# Patient Record
Sex: FEMALE | Race: WHITE | NOT HISPANIC OR LATINO | Employment: UNEMPLOYED | URBAN - METROPOLITAN AREA
[De-identification: names, ages, dates, MRNs, and addresses within clinical notes are randomized per-mention and may not be internally consistent; named-entity substitution may affect disease eponyms.]

---

## 2017-06-02 ENCOUNTER — HOSPITAL ENCOUNTER (EMERGENCY)
Facility: HOSPITAL | Age: 22
Discharge: HOME | End: 2017-06-02
Attending: EMERGENCY MEDICINE | Admitting: EMERGENCY MEDICINE

## 2017-06-02 VITALS
BODY MASS INDEX: 42.68 KG/M2 | WEIGHT: 250 LBS | TEMPERATURE: 98.7 F | HEIGHT: 64 IN | RESPIRATION RATE: 18 BRPM | OXYGEN SATURATION: 97 % | SYSTOLIC BLOOD PRESSURE: 120 MMHG | HEART RATE: 78 BPM | DIASTOLIC BLOOD PRESSURE: 73 MMHG

## 2017-06-02 DIAGNOSIS — H61.22 IMPACTED CERUMEN OF LEFT EAR: Primary | ICD-10-CM

## 2017-06-02 DIAGNOSIS — Z33.1 PREGNANCY, INCIDENTAL: ICD-10-CM

## 2017-06-02 PROCEDURE — 99283 EMERGENCY DEPT VISIT LOW MDM: CPT

## 2017-06-02 RX ORDER — DOCUSATE SODIUM 100 MG/1
100 CAPSULE, LIQUID FILLED ORAL ONCE
Status: COMPLETED | OUTPATIENT
Start: 2017-06-02 | End: 2017-06-02

## 2017-06-02 RX ADMIN — DOCUSATE SODIUM 100 MG: 100 CAPSULE, LIQUID FILLED ORAL at 02:33

## 2017-06-02 NOTE — ED PROVIDER NOTES
Subjective   HPI Comments: The patient presents with left ear pain since around 5 PM today.  The patient reports she did do some swimming today.  The patient denies any fever, chills, nausea, vomiting, or diarrhea.  The patient denies any other head or neck concerns.  The patient does report that she uses Q-tips.  Incidentally, the patient is also pregnant.  The patient does report a feeling of muffled hearing in the left ear.      History provided by:  Patient      Review of Systems   HENT: Positive for ear pain.    All other systems reviewed and are negative.      Past Medical History:   Diagnosis Date   • Acid reflux        Allergies   Allergen Reactions   • Zyrtec [Cetirizine]        Past Surgical History:   Procedure Laterality Date   • WISDOM TOOTH EXTRACTION         History reviewed. No pertinent family history.    Social History     Social History   • Marital status: Single     Spouse name: N/A   • Number of children: N/A   • Years of education: N/A     Social History Main Topics   • Smoking status: Current Every Day Smoker     Packs/day: 0.50     Types: Cigarettes   • Smokeless tobacco: None   • Alcohol use 2.4 oz/week     4 Cans of beer per week      Comment: 4 of malt liquor nightly   • Drug use: No   • Sexual activity: Defer     Other Topics Concern   • None     Social History Narrative           Objective   Physical Exam   Constitutional: She is oriented to person, place, and time. She appears well-developed and well-nourished. No distress.   HENT:   Head: Normocephalic and atraumatic.   Right Ear: External ear normal.   Mouth/Throat: Oropharynx is clear and moist.   The patient has a cerumen impaction up against the left tympanic membrane with appearance of the use of Q-tips.  There is no obvious perforation though there is complete occlusion of the tympanic membrane.  There is no active bleeding noted.  There is no exudate or sign of external infection.  No other emergent findings.  No pain with  manipulation of the ear itself.   Neck: Normal range of motion.   Neurological: She is alert and oriented to person, place, and time.   Skin: Skin is warm and dry.   Psychiatric: She has a normal mood and affect. Her behavior is normal.   Nursing note and vitals reviewed.      Procedures         ED Course  ED Course   Comment By Time   The patient has findings consistent with cerumen impaction of the left external auditory canal from the use of Q-tips up against the tympanic membrane.  This point I don't feel comfortable trying to figure this out as it is right up against the tympanic membrane.  We'll use of Colace to help soften this then discharge the patient was drops.  Did educate on Q-tip usage as well.  She voices understanding and agrees. Garland James MD 06/02 0203                  [facility]    Final diagnoses:   Impacted cerumen of left ear   Pregnancy, incidental            Garland James MD  06/02/17 [id number]

## 2022-03-11 ENCOUNTER — OFFICE VISIT (OUTPATIENT)
Dept: INTERNAL MEDICINE | Facility: CLINIC | Age: 27
End: 2022-03-11

## 2022-03-11 VITALS
BODY MASS INDEX: 48.82 KG/M2 | WEIGHT: 293 LBS | TEMPERATURE: 96.9 F | SYSTOLIC BLOOD PRESSURE: 128 MMHG | HEART RATE: 82 BPM | OXYGEN SATURATION: 98 % | DIASTOLIC BLOOD PRESSURE: 86 MMHG | HEIGHT: 65 IN

## 2022-03-11 DIAGNOSIS — K21.9 GASTROESOPHAGEAL REFLUX DISEASE, UNSPECIFIED WHETHER ESOPHAGITIS PRESENT: ICD-10-CM

## 2022-03-11 DIAGNOSIS — R11.0 NAUSEA: ICD-10-CM

## 2022-03-11 DIAGNOSIS — E03.9 ACQUIRED HYPOTHYROIDISM: ICD-10-CM

## 2022-03-11 DIAGNOSIS — Z91.09 ENVIRONMENTAL ALLERGIES: ICD-10-CM

## 2022-03-11 DIAGNOSIS — F41.9 ANXIETY: Primary | ICD-10-CM

## 2022-03-11 PROBLEM — J30.2 SEASONAL ALLERGIES: Status: ACTIVE | Noted: 2017-12-22

## 2022-03-11 PROBLEM — F43.10 PTSD (POST-TRAUMATIC STRESS DISORDER): Status: RESOLVED | Noted: 2020-12-01 | Resolved: 2022-03-11

## 2022-03-11 PROBLEM — F34.1 DYSTHYMIC DISORDER: Status: ACTIVE | Noted: 2021-05-03

## 2022-03-11 PROBLEM — Q60.2 KIDNEY AGENESIS: Status: ACTIVE | Noted: 2021-04-19

## 2022-03-11 PROBLEM — F43.10 PTSD (POST-TRAUMATIC STRESS DISORDER): Status: ACTIVE | Noted: 2020-12-01

## 2022-03-11 PROBLEM — Q60.2 KIDNEY AGENESIS: Status: RESOLVED | Noted: 2021-04-19 | Resolved: 2022-03-11

## 2022-03-11 PROBLEM — G43.909 MIGRAINE: Status: ACTIVE | Noted: 2019-03-21

## 2022-03-11 PROBLEM — E66.01 OBESITY, MORBID, BMI 50 OR HIGHER: Status: ACTIVE | Noted: 2019-06-05

## 2022-03-11 PROBLEM — G43.909 MIGRAINE: Status: RESOLVED | Noted: 2019-03-21 | Resolved: 2022-03-11

## 2022-03-11 PROBLEM — J45.909 ASTHMA, MILD: Status: ACTIVE | Noted: 2019-05-20

## 2022-03-11 PROBLEM — F34.1 DYSTHYMIC DISORDER: Status: RESOLVED | Noted: 2021-05-03 | Resolved: 2022-03-11

## 2022-03-11 PROCEDURE — 99204 OFFICE O/P NEW MOD 45 MIN: CPT | Performed by: NURSE PRACTITIONER

## 2022-03-11 RX ORDER — HYDROXYZINE PAMOATE 50 MG/1
100 CAPSULE ORAL 3 TIMES DAILY PRN
Qty: 90 CAPSULE | Refills: 1 | Status: SHIPPED | OUTPATIENT
Start: 2022-03-11 | End: 2022-06-21 | Stop reason: SDUPTHER

## 2022-03-11 RX ORDER — LORATADINE ORAL 5 MG/5ML
10 SOLUTION ORAL DAILY
COMMUNITY
End: 2022-03-11 | Stop reason: SDUPTHER

## 2022-03-11 RX ORDER — OMEPRAZOLE 40 MG/1
40 CAPSULE, DELAYED RELEASE ORAL DAILY
Qty: 90 CAPSULE | Refills: 1 | Status: SHIPPED | OUTPATIENT
Start: 2022-03-11 | End: 2022-11-07 | Stop reason: SDUPTHER

## 2022-03-11 RX ORDER — HYDROXYZINE PAMOATE 50 MG/1
50 CAPSULE ORAL 3 TIMES DAILY PRN
COMMUNITY
Start: 2021-09-23 | End: 2022-03-11 | Stop reason: SDUPTHER

## 2022-03-11 RX ORDER — OMEPRAZOLE 40 MG/1
1 CAPSULE, DELAYED RELEASE ORAL DAILY
COMMUNITY
Start: 2021-11-11 | End: 2022-03-11 | Stop reason: SDUPTHER

## 2022-03-11 RX ORDER — LEVOTHYROXINE SODIUM 0.1 MG/1
1 TABLET ORAL DAILY
COMMUNITY
Start: 2022-01-05 | End: 2022-03-11 | Stop reason: SDUPTHER

## 2022-03-11 RX ORDER — ONDANSETRON 4 MG/1
4 TABLET, ORALLY DISINTEGRATING ORAL EVERY 8 HOURS PRN
Qty: 30 TABLET | Refills: 0 | Status: SHIPPED | OUTPATIENT
Start: 2022-03-11 | End: 2022-08-05 | Stop reason: SDUPTHER

## 2022-03-11 RX ORDER — LEVOTHYROXINE SODIUM 0.1 MG/1
100 TABLET ORAL DAILY
Qty: 30 TABLET | Refills: 1 | Status: SHIPPED | OUTPATIENT
Start: 2022-03-11 | End: 2022-06-06

## 2022-03-11 RX ORDER — LORATADINE ORAL 5 MG/5ML
10 SOLUTION ORAL DAILY
Qty: 240 ML | Refills: 11 | Status: SHIPPED | OUTPATIENT
Start: 2022-03-11 | End: 2022-08-22

## 2022-03-11 RX ORDER — ESCITALOPRAM OXALATE 5 MG/1
5 TABLET ORAL DAILY
Qty: 30 TABLET | Refills: 1 | Status: SHIPPED | OUTPATIENT
Start: 2022-03-11 | End: 2022-04-12

## 2022-03-11 RX ORDER — FLUTICASONE PROPIONATE 50 MCG
2 SPRAY, SUSPENSION (ML) NASAL DAILY
Qty: 16 G | Refills: 11 | Status: SHIPPED | OUTPATIENT
Start: 2022-03-11
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
## 2022-04-12 ENCOUNTER — OFFICE VISIT (OUTPATIENT)
Dept: INTERNAL MEDICINE | Facility: CLINIC | Age: 27
End: 2022-04-12

## 2022-04-12 VITALS
DIASTOLIC BLOOD PRESSURE: 72 MMHG | HEART RATE: 91 BPM | TEMPERATURE: 98.1 F | BODY MASS INDEX: 48.82 KG/M2 | SYSTOLIC BLOOD PRESSURE: 124 MMHG | RESPIRATION RATE: 18 BRPM | WEIGHT: 293 LBS | HEIGHT: 65 IN | OXYGEN SATURATION: 98 %

## 2022-04-12 DIAGNOSIS — Z91.09 ENVIRONMENTAL ALLERGIES: ICD-10-CM

## 2022-04-12 DIAGNOSIS — F41.9 ANXIETY: Primary | ICD-10-CM

## 2022-04-12 DIAGNOSIS — K21.9 GASTROESOPHAGEAL REFLUX DISEASE, UNSPECIFIED WHETHER ESOPHAGITIS PRESENT: ICD-10-CM

## 2022-04-12 DIAGNOSIS — J45.20 MILD INTERMITTENT ASTHMA WITHOUT COMPLICATION: ICD-10-CM

## 2022-04-12 DIAGNOSIS — J02.9 SORE THROAT: ICD-10-CM

## 2022-04-12 DIAGNOSIS — F90.0 ATTENTION DEFICIT HYPERACTIVITY DISORDER (ADHD), PREDOMINANTLY INATTENTIVE TYPE: ICD-10-CM

## 2022-04-12 LAB
EXPIRATION DATE: NORMAL
INTERNAL CONTROL: NORMAL
Lab: NORMAL
S PYO AG THROAT QL: NEGATIVE

## 2022-04-12 PROCEDURE — 99214 OFFICE O/P EST MOD 30 MIN: CPT | Performed by: NURSE PRACTITIONER

## 2022-04-12 PROCEDURE — 87880 STREP A ASSAY W/OPTIC: CPT | Performed by: NURSE PRACTITIONER

## 2022-04-12 RX ORDER — ESCITALOPRAM OXALATE 10 MG/1
10 TABLET ORAL DAILY
Qty: 30 TABLET | Refills: 1 | Status: SHIPPED | OUTPATIENT
Start: 2022-04-12 | End: 2022-06-15

## 2022-04-12 RX ORDER — BROMPHENIRAMINE MALEATE, PSEUDOEPHEDRINE HYDROCHLORIDE, AND DEXTROMETHORPHAN HYDROBROMIDE 2; 30; 10 MG/5ML; MG/5ML; MG/5ML
10 SYRUP ORAL 4 TIMES DAILY PRN
Qty: 240 ML | Refills: 0 | Status: SHIPPED | OUTPATIENT
Start: 2022-04-12 | End: 2022-06-21

## 2022-04-12 RX ORDER — ALBUTEROL SULFATE 90 UG/1
2 AEROSOL, METERED RESPIRATORY (INHALATION) EVERY 4 HOURS PRN
Qty: 18 G | Refills: 2 | Status: SHIPPED | OUTPATIENT
Start: 2022-04-12 | End: 2023-03-03

## 2022-04-12 NOTE — PROGRESS NOTES
Lilly Segovia presents to [facility] for     Chief Complaint  Chief Complaint   Patient presents with   • Heartburn     4 WK FU (Omeprazole 40MG)    • Anxiety     4 WK FU (Lexapro 5mg, Hydroxyzine PRN)    • ADHD     Wanting to get back on medication         Subjective        History of Present Illness    Anxiety- comes in spurts.   Anger is her biggest issue. Feels like she loses her temper at home.   Started on lexapro. Made her really tired for the first week so started taking it at night and has improved.   She has ADHD. WAS on medications in the past (vyvanse) but stopped in 207/2018  Has trouble focusing and multitasking. Often feels like too much is happening in her brain at one time.   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   Gets bored easily. Has started jobs and gotten bored with them so she quits. She has had about 6 jobs in the past year.   Feels anxious more and more at work.   Wants to restart ADHD meds       C/o sore thorat. Works in a . Not sure if she has had strep exposure. Wants some cough syrup to habve on hand . Mild cough.   No fever or SOA.     Has mild asthma. Needs refill on albuterol. Uses 1-2 times a week.     Has heartburn.  Started on omeprazole.  Doing much better with this.      Review of Systems   Constitutional: Positive for fatigue. Negative for activity change, appetite change, chills, diaphoresis, fever, unexpected weight gain and unexpected weight loss.   HENT: Positive for sore throat. Negative for swollen glands and voice change.    Eyes: Negative for blurred vision, double vision, pain and visual disturbance.   Respiratory: Negative for cough, chest tightness, shortness of breath and wheezing.    Cardiovascular: Negative for chest pain, palpitations and leg swelling.   Gastrointestinal: Positive for nausea, GERD and indigestion. Negative for abdominal distention, abdominal pain, constipation, diarrhea and vomiting.   Endocrine:  Negative for cold intolerance, heat intolerance, polydipsia, polyphagia and polyuria.   Genitourinary: Negative for difficulty urinating, frequency and urgency.   Musculoskeletal: Negative for arthralgias and myalgias.   Skin: Negative for color change, dry skin and rash.   Allergic/Immunologic: Positive for environmental allergies.   Neurological: Negative for dizziness, facial asymmetry, weakness, light-headedness, numbness, headache and confusion.   Hematological: Negative for adenopathy. Does not bruise/bleed easily.   Psychiatric/Behavioral: Positive for decreased concentration, sleep disturbance, depressed mood and stress. Negative for self-injury and suicidal ideas. The patient is nervous/anxious.          Allergies   Allergen Reactions   • Cetirizine Anaphylaxis and Unknown - High Severity     Current Outpatient Medications on File Prior to Visit   Medication Sig Dispense Refill   • fluticasone (Flonase) 50 MCG/ACT nasal spray 2 sprays into the nostril(s) as directed by provider Daily. 16 g 11   • hydrOXYzine pamoate (VISTARIL) 50 MG capsule Take 2 capsules by mouth 3 (Three) Times a Day As Needed for Anxiety. 90 capsule 1   • levothyroxine (SYNTHROID, LEVOTHROID) 100 MCG tablet Take 1 tablet by mouth Daily. 30 tablet 1   • loratadine (Loratadine Childrens) 5 MG/5ML syrup Take 10 mL by mouth Daily. 240 mL 11   • omeprazole (priLOSEC) 40 MG capsule Take 1 capsule by mouth Daily. 90 capsule 1   • ondansetron ODT (Zofran ODT) 4 MG disintegrating tablet Place 1 tablet on the tongue Every 8 (Eight) Hours As Needed for Nausea or Vomiting. 30 tablet 0     No current facility-administered medications on file prior to visit.         The following portions of the patient's history were reviewed and updated as appropriate: allergies, current medications, past family history, past medical history, past social history, past surgical history and problem list and are available for review within electronic  "record    Objective     Result Review :     The following data was reviewed by: EDGARDO Patterson on 04/12/2022:    Results for orders placed or performed in visit on 04/12/22   POCT rapid strep A    Specimen: Swab   Result Value Ref Range    Rapid Strep A Screen Negative Negative, VALID, INVALID, Not Performed    Internal Control Passed Passed    Lot Number [id number]     Expiration Date 09/30/2023                 Vital Signs:   /72   Pulse 91   Temp 98.1 °F (36.7 °C) (Infrared)   Resp 18   Ht 163.8 cm (64.5\")   Wt 135 kg (298 lb)   SpO2 98%   BMI 50.36 kg/m²         Physical Exam  Vitals and nursing note reviewed.   Constitutional:       General: She is not in acute distress.     Appearance: Normal appearance. She is well-developed. She is not diaphoretic.   HENT:      Head: Normocephalic and atraumatic.      Right Ear: Tympanic membrane normal.      Left Ear: Tympanic membrane normal.      Nose: Nose normal.      Mouth/Throat:      Lips: Pink.      Mouth: Mucous membranes are moist.      Pharynx: Oropharynx is clear. Uvula midline.   Eyes:      Conjunctiva/sclera: Conjunctivae normal.   Neck:      Vascular: No JVD.   Cardiovascular:      Rate and Rhythm: Normal rate and regular rhythm.      Heart sounds: Normal heart sounds. No murmur heard.  Pulmonary:      Effort: Pulmonary effort is normal. No respiratory distress.      Breath sounds: Normal breath sounds.   Chest:      Chest wall: No tenderness.   Abdominal:      General: Bowel sounds are normal. There is no distension.      Palpations: Abdomen is soft.      Tenderness: There is no abdominal tenderness.   Musculoskeletal:         General: Normal range of motion.      Cervical back: Normal range of motion.   Skin:     General: Skin is warm and dry.      Coloration: Skin is not pale.      Findings: No erythema.   Neurological:      Mental Status: She is alert and oriented to person, place, and time.   Psychiatric:         Behavior: Behavior " normal.         Thought Content: Thought content normal.         Judgment: Judgment normal.                 Assessment and Plan      Diagnoses and all orders for this visit:    1. Anxiety (Primary)  -     escitalopram (Lexapro) 10 MG tablet; Take 1 tablet by mouth Daily.  Dispense: 30 tablet; Refill: 1  Improving. increase lexapro   2. Gastroesophageal reflux disease, unspecified whether esophagitis present  Improving. Cont PPI therapy.   3. Mild intermittent asthma without complication  -     albuterol sulfate  (90 Base) MCG/ACT inhaler; Inhale 2 puffs Every 4 (Four) Hours As Needed for Wheezing or Shortness of Air.  Dispense: 18 g; Refill: 2  Well controlled.   Refill albuterol inhaler  4. Attention deficit hyperactivity disorder (ADHD), predominantly inattentive type  -     Ambulatory Referral to Psychiatry  Refer to psych as I cannot write for ADHD medications  5. Sore throat  -     POCT rapid strep A  strep negative.   Will treat with bromfed DM for allergy symptoms  6. Environmental allergies  -     brompheniramine-pseudoephedrine-DM 30-2-10 MG/5ML syrup; Take 10 mL by mouth 4 (Four) Times a Day As Needed for Congestion or Cough.  Dispense: 240 mL; Refill: 0        Follow Up     Patient was given instructions and counseling regarding her condition or for health maintenance advice. Please see specific information pulled into the AVS if appropriate.   Any new medication's adverse effects have been discussed in detail with patient  Patient was encouraged to keep me informed of any acute changes, lack of improvement, or any new concerning symptoms.    Return in about 6 weeks (around 5/24/2022) for Recheck.          Dictated Utilizing Dragon Dictation   Please note that portions of this note were completed with a voice recognition program.   Part of this note may be an electronic transcription/translation of spoken language to printed text using the Dragon Dictation System.

## 2022-04-18 ENCOUNTER — TELEMEDICINE (OUTPATIENT)
Dept: FAMILY MEDICINE CLINIC | Facility: TELEHEALTH | Age: 27
End: 2022-04-18

## 2022-04-18 DIAGNOSIS — J01.01 ACUTE RECURRENT MAXILLARY SINUSITIS: Primary | ICD-10-CM

## 2022-04-18 PROCEDURE — 99213 OFFICE O/P EST LOW 20 MIN: CPT | Performed by: NURSE PRACTITIONER

## 2022-04-18 RX ORDER — DOXYCYCLINE HYCLATE 100 MG/1
100 CAPSULE ORAL 2 TIMES DAILY
Qty: 14 CAPSULE | Refills: 0 | Status: SHIPPED | OUTPATIENT
Start: 2022-04-18 | End: 2022-06-21

## 2022-04-18 NOTE — PROGRESS NOTES
You have chosen to receive care through a telehealth visit.  Do you consent to use a video/audio connection for your medical care today? Yes     CHIEF COMPLAINT  Chief Complaint   Patient presents with   • Sinus Problem   • Sore Throat   • Cough         HPI  Lilly Segovia is a 26 y.o. female  presents with complaint of over a week of cough, sore throat, post nasal drainage and chest congestion. He states she has sinus congestion and yellow nasal discharge with green cough phlegm. She has no reported fever. She is using Bromfed DM without improvement. She has tested herself for Covid 19 which she took using a rapid home test.     Review of Systems   Constitutional: Positive for activity change, appetite change and fatigue. Negative for fever.   HENT: Positive for congestion, nosebleeds, postnasal drip, rhinorrhea, sinus pressure, sinus pain and sore throat.    Respiratory: Positive for cough and chest tightness. Negative for shortness of breath, wheezing and stridor.    Cardiovascular: Negative.    Gastrointestinal: Negative.    Musculoskeletal: Negative.    Skin: Negative.    Allergic/Immunologic: Negative.    Neurological: Negative.    Psychiatric/Behavioral: Negative.        Past Medical History:   Diagnosis Date   • Acid reflux    • ADHD (attention deficit hyperactivity disorder)    • Allergic    • Asthma    • Disease of thyroid gland    • Dysthymic disorder 05/03/2021    Formatting of this note might be different from the original.  Saw counselors and took medications as a teenager. Was in a mental hospital at one point. Unsure what the diagnoses were. States all medications (lexapro, paxil, prozac) made her feel like a zombie.  In early 2020, tried to take citalopram liquid for 1 week, had side effects (felt drugged up). Stopped medication. Feels like anxiety com   • Hypothyroidism    • Kidney agenesis 04/19/2021    Formatting of this note might be different from the original.  Went to ER in April 2021 for pain, had  CT scan which showed lack of a kidney.   • Migraine 2019   • PTSD (post-traumatic stress disorder) 2020    Formatting of this note might be different from the original.  Prior panic attacks.  Went on trip last month and had some anxiety over it.  Worried about a possible COVID exposure but, did not come down with it.   Feels like her anxiety is getting worse.  She is not working, she's at home all day with her daughter and the dog.  She feels like her anxiety is turning into rage, like she needs to yel       Family History   Problem Relation Age of Onset   • Hypertension Mother    • Diabetes Mother    • Diverticulitis Mother    • Pancreatitis Mother    • Cholelithiasis Mother        Social History     Socioeconomic History   • Marital status: Single   Tobacco Use   • Smoking status: Former Smoker     Packs/day: 0.50     Years: 6.00     Pack years: 3.00     Types: Cigarettes     Quit date: 2014     Years since quittin.2   • Smokeless tobacco: Never Used   Vaping Use   • Vaping Use: Never used   Substance and Sexual Activity   • Alcohol use: Not Currently     Alcohol/week: 1.0 - 2.0 standard drink     Types: 1 - 2 Glasses of wine per week   • Drug use: Never   • Sexual activity: Yes     Birth control/protection: None       Lilly Segovia  reports that she quit smoking about 8 years ago. Her smoking use included cigarettes. She has a 3.00 pack-year smoking history. She has never used smokeless tobacco..      There were no vitals taken for this visit.    PHYSICAL EXAM  Physical Exam   Constitutional: She is oriented to person, place, and time. She appears well-developed and well-nourished. No distress.   HENT:   Head: Normocephalic and atraumatic.   Right Ear: Hearing normal.   Left Ear: Hearing normal.   Nose: Rhinorrhea present. Right sinus exhibits maxillary sinus tenderness. Right sinus exhibits no frontal sinus tenderness. Left sinus exhibits maxillary sinus tenderness. Left sinus exhibits no frontal  sinus tenderness.   Mouth/Throat: Mouth/Lips are normal.  Eyes: Conjunctivae and EOM are normal.   Pulmonary/Chest: Effort normal.   Abdominal: Abdomen appears normal. Soft.   Neurological: She is alert and oriented to person, place, and time.   Psychiatric: She has a normal mood and affect.   Vitals reviewed.      Results for orders placed or performed in visit on 04/12/22   POCT rapid strep A    Specimen: Swab   Result Value Ref Range    Rapid Strep A Screen Negative Negative, VALID, INVALID, Not Performed    Internal Control Passed Passed    Lot Number [id number]     Expiration Date 09/30/2023        Diagnoses and all orders for this visit:    1. Acute recurrent maxillary sinusitis (Primary)  -     doxycycline (VIBRAMYCIN) 100 MG capsule; Take 1 capsule by mouth 2 (Two) Times a Day.  Dispense: 14 capsule; Refill: 0    increased fluids and rest.   Mucinex DM as directed every 12 hours prn.         FOLLOW-UP  As discussed during visit with PCP/[facility] if no improvement or Urgent Care/Emergency Department if worsening of symptoms    Patient verbalizes understanding of medication dosage, comfort measures, instructions for treatment and follow-up.    Yesenia Moya, EDGARDO  04/18/2022  14:37 EDT    This visit was performed via Telehealth.  This patient has been instructed to follow-up with their primary care provider if their symptoms worsen or the treatment provided does not resolve their illness.

## 2022-04-27 ENCOUNTER — TELEPHONE (OUTPATIENT)
Dept: INTERNAL MEDICINE | Facility: CLINIC | Age: 27
End: 2022-04-27

## 2022-04-27 NOTE — TELEPHONE ENCOUNTER
Spoke to Pt and informed her of Dr. Garcia no accepting new pt's at this time and to contact PCP to get referral. Pt voiced understanding

## 2022-06-02 DIAGNOSIS — E03.9 ACQUIRED HYPOTHYROIDISM: ICD-10-CM

## 2022-06-03 NOTE — TELEPHONE ENCOUNTER
LV: 04/12/2022  NV: not scheduled    Attempted to contact patient but number listed in chart is invalid. Will send [employer] message.

## 2022-06-06 RX ORDER — LEVOTHYROXINE SODIUM 0.1 MG/1
TABLET ORAL
Qty: 30 TABLET | Refills: 0 | Status: SHIPPED | OUTPATIENT
Start: 2022-06-06 | End: 2022-06-21 | Stop reason: SDUPTHER

## 2022-06-06 NOTE — TELEPHONE ENCOUNTER
Attempted to contact patient but number listed in chart is invalid, will continue to try and reach out to patient.

## 2022-06-06 NOTE — TELEPHONE ENCOUNTER
PATIENT HAS BEEN SCHEDULED FOR 06/17. SHE IS OUR OF MEDICATION AND WOULD LIKE ENOUGH TO LAST HER UNTIL THEN

## 2022-06-15 DIAGNOSIS — F41.9 ANXIETY: ICD-10-CM

## 2022-06-15 RX ORDER — ESCITALOPRAM OXALATE 10 MG/1
TABLET ORAL
Qty: 30 TABLET | Refills: 0 | Status: SHIPPED | OUTPATIENT
Start: 2022-06-15 | End: 2022-06-21

## 2022-06-15 NOTE — TELEPHONE ENCOUNTER
Rx Refill Note  Requested Prescriptions     Pending Prescriptions Disp Refills   • escitalopram (LEXAPRO) 10 MG tablet [Pharmacy Med Name: ESCITALOPRAM 10 MG TABLET 10 Tablet] 30 tablet 1     Sig: TAKE 1 TABLET BY MOUTH ONCE DAILY      Last filled: 04/12/2022  Last office visit with prescribing clinician: 4/12/2022      Next office visit with prescribing clinician: 6/20/2022 April COMPA Molina MA  06/15/22, 12:28 EDT

## 2022-06-21 ENCOUNTER — OFFICE VISIT (OUTPATIENT)
Dept: INTERNAL MEDICINE | Facility: CLINIC | Age: 27
End: 2022-06-21

## 2022-06-21 ENCOUNTER — LAB (OUTPATIENT)
Dept: LAB | Facility: HOSPITAL | Age: 27
End: 2022-06-21

## 2022-06-21 VITALS
SYSTOLIC BLOOD PRESSURE: 118 MMHG | HEART RATE: 96 BPM | OXYGEN SATURATION: 96 % | TEMPERATURE: 97.1 F | RESPIRATION RATE: 18 BRPM | DIASTOLIC BLOOD PRESSURE: 84 MMHG | HEIGHT: 65 IN | BODY MASS INDEX: 48.82 KG/M2 | WEIGHT: 293 LBS

## 2022-06-21 DIAGNOSIS — E55.9 VITAMIN D DEFICIENCY: ICD-10-CM

## 2022-06-21 DIAGNOSIS — D22.9 MULTIPLE NEVI: ICD-10-CM

## 2022-06-21 DIAGNOSIS — E53.8 B12 DEFICIENCY: ICD-10-CM

## 2022-06-21 DIAGNOSIS — Z83.3 FAMILY HISTORY OF DIABETES MELLITUS: ICD-10-CM

## 2022-06-21 DIAGNOSIS — R53.83 FATIGUE, UNSPECIFIED TYPE: ICD-10-CM

## 2022-06-21 DIAGNOSIS — E03.9 ACQUIRED HYPOTHYROIDISM: ICD-10-CM

## 2022-06-21 DIAGNOSIS — F41.9 ANXIETY: Primary | ICD-10-CM

## 2022-06-21 DIAGNOSIS — F41.9 ANXIETY: ICD-10-CM

## 2022-06-21 LAB
ALBUMIN SERPL-MCNC: 4.9 G/DL (ref 3.5–5.2)
ALBUMIN/GLOB SERPL: 1.6 G/DL
ALP SERPL-CCNC: 66 U/L (ref 39–117)
ALT SERPL W P-5'-P-CCNC: 20 U/L (ref 1–33)
ANION GAP SERPL CALCULATED.3IONS-SCNC: 14 MMOL/L (ref 5–15)
AST SERPL-CCNC: 16 U/L (ref 1–32)
BILIRUB SERPL-MCNC: 0.3 MG/DL (ref 0–1.2)
BUN SERPL-MCNC: 8 MG/DL (ref 6–20)
BUN/CREAT SERPL: 15.1 (ref 7–25)
CALCIUM SPEC-SCNC: 10.1 MG/DL (ref 8.6–10.5)
CHLORIDE SERPL-SCNC: 101 MMOL/L (ref 98–107)
CO2 SERPL-SCNC: 23 MMOL/L (ref 22–29)
CREAT SERPL-MCNC: 0.53 MG/DL (ref 0.57–1)
DEPRECATED RDW RBC AUTO: 42.9 FL (ref 37–54)
EGFRCR SERPLBLD CKD-EPI 2021: 129.4 ML/MIN/1.73
ERYTHROCYTE [DISTWIDTH] IN BLOOD BY AUTOMATED COUNT: 13.6 % (ref 12.3–15.4)
GLOBULIN UR ELPH-MCNC: 3.1 GM/DL
GLUCOSE SERPL-MCNC: 94 MG/DL (ref 65–99)
HBA1C MFR BLD: 5.4 % (ref 4.8–5.6)
HCT VFR BLD AUTO: 41.6 % (ref 34–46.6)
HGB BLD-MCNC: 14.1 G/DL (ref 12–15.9)
MCH RBC QN AUTO: 29.5 PG (ref 26.6–33)
MCHC RBC AUTO-ENTMCNC: 33.9 G/DL (ref 31.5–35.7)
MCV RBC AUTO: 87 FL (ref 79–97)
PLATELET # BLD AUTO: 287 10*3/MM3 (ref 140–450)
PMV BLD AUTO: 10.4 FL (ref 6–12)
POTASSIUM SERPL-SCNC: 4 MMOL/L (ref 3.5–5.2)
PROT SERPL-MCNC: 8 G/DL (ref 6–8.5)
RBC # BLD AUTO: 4.78 10*6/MM3 (ref 3.77–5.28)
SODIUM SERPL-SCNC: 138 MMOL/L (ref 136–145)
WBC NRBC COR # BLD: 10.99 10*3/MM3 (ref 3.4–10.8)

## 2022-06-21 PROCEDURE — 99214 OFFICE O/P EST MOD 30 MIN: CPT | Performed by: NURSE PRACTITIONER

## 2022-06-21 PROCEDURE — 84443 ASSAY THYROID STIM HORMONE: CPT | Performed by: NURSE PRACTITIONER

## 2022-06-21 PROCEDURE — 82607 VITAMIN B-12: CPT | Performed by: NURSE PRACTITIONER

## 2022-06-21 PROCEDURE — 82306 VITAMIN D 25 HYDROXY: CPT | Performed by: NURSE PRACTITIONER

## 2022-06-21 PROCEDURE — 83036 HEMOGLOBIN GLYCOSYLATED A1C: CPT | Performed by: NURSE PRACTITIONER

## 2022-06-21 PROCEDURE — 80053 COMPREHEN METABOLIC PANEL: CPT | Performed by: NURSE PRACTITIONER

## 2022-06-21 PROCEDURE — 85027 COMPLETE CBC AUTOMATED: CPT | Performed by: NURSE PRACTITIONER

## 2022-06-21 RX ORDER — LEVOTHYROXINE SODIUM 0.1 MG/1
100 TABLET ORAL DAILY
Qty: 30 TABLET | Refills: 3 | Status: SHIPPED | OUTPATIENT
Start: 2022-06-21 | End: 2022-12-30 | Stop reason: SDUPTHER

## 2022-06-21 RX ORDER — ESCITALOPRAM OXALATE 20 MG/1
20 TABLET ORAL DAILY
Qty: 30 TABLET | Refills: 2 | Status: SHIPPED | OUTPATIENT
Start: 2022-06-21 | End: 2022-08-03 | Stop reason: SDUPTHER

## 2022-06-21 RX ORDER — ESCITALOPRAM OXALATE 10 MG/1
10 TABLET ORAL DAILY
Qty: 30 TABLET | Refills: 0 | Status: CANCELLED | OUTPATIENT
Start: 2022-06-21

## 2022-06-21 RX ORDER — HYDROXYZINE PAMOATE 50 MG/1
100 CAPSULE ORAL 3 TIMES DAILY PRN
Qty: 90 CAPSULE | Refills: 1 | Status: SHIPPED | OUTPATIENT
Start: 2022-06-21 | End: 2022-08-03

## 2022-06-21 NOTE — PROGRESS NOTES
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     Anxiety-chronic.  Comes in spurts.  Still reports that anger is her biggest issue and she can often lose her temper easily.  Feels like the Lexapro may have helped a little bit.  She did report that she previously had no libido but since starting the Lexapro has noticed an increased libido.  She has ADHD. WAS on medications in the past (vyvanse) but stopped in 207/2018  Has trouble focusing and multitasking. Often feels like too much is happening in her brain at one time.   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   Gets bored easily. Has started jobs and gotten bored with them so she quits. She has had about 6 jobs in the past year.   Feels anxious more and more at work.   Wants to restart ADHD meds   increased lexapro at last visit-she did well with this but reports that she still feels anxious overall.      Hypothyroidism-  Chronic. On levothyroxine    Current symptoms: change in energy level . Patient denies diarrhea, heat / cold intolerance, nervousness, palpitations and weight changes. Symptoms have been well-controlled.     She is  concerned about multiple moles. Has one on her right chin that is large and gets caught on things and bleeds.   Has had several removed in the past and reports they were questionable.   Wants referral to dermatology.    She has a family history of diabetes and is concerned about the possibility of developing diabetes.  She is interested in screening.  Reports she had a normal screening several years ago.  She has had vitamin D and B12 deficiency in the past.  Would like to have these rechecked as she is feeling more fatigued.        Review of Systems   Constitutional:  Positive for fatigue. Negative for activity change, appetite change, chills, diaphoresis, fever, unexpected weight gain and unexpected weight loss.   HENT: Positive for sore throat. Negative for swollen glands and voice change.    Eyes: Negative for blurred vision, double vision, pain and visual disturbance.   Respiratory: Negative for cough, chest tightness, shortness of breath and wheezing.    Cardiovascular: Negative for chest pain, palpitations and leg swelling.   Gastrointestinal: Positive for nausea, GERD and indigestion. Negative for abdominal distention, abdominal pain, constipation, diarrhea and vomiting.   Endocrine: Negative for cold intolerance, heat intolerance, polydipsia, polyphagia and polyuria.   Genitourinary: Negative for difficulty urinating, frequency and urgency.   Musculoskeletal: Negative for arthralgias and myalgias.   Skin: Negative for color change, dry skin and rash.   Allergic/Immunologic: Positive for environmental allergies.   Neurological: Negative for dizziness, facial asymmetry, weakness, light-headedness, numbness, headache and confusion.   Hematological: Negative for adenopathy. Does not bruise/bleed easily.   Psychiatric/Behavioral: Positive for decreased concentration, sleep disturbance, depressed mood and stress. Negative for self-injury and suicidal ideas. The patient is nervous/anxious.          Allergies   Allergen Reactions   • Cetirizine Anaphylaxis and Unknown - High Severity     Current Outpatient Medications on File Prior to Visit   Medication Sig Dispense Refill   • albuterol sulfate  (90 Base) MCG/ACT inhaler Inhale 2 puffs Every 4 (Four) Hours As Needed for Wheezing or Shortness of Air. 18 g 2   • fluticasone (Flonase) 50 MCG/ACT nasal spray 2 sprays into the nostril(s) as directed by provider Daily. 16 g 11   • loratadine (Loratadine Childrens) 5 MG/5ML syrup Take 10 mL by mouth Daily. 240 mL 11   • omeprazole (priLOSEC) 40 MG capsule Take 1 capsule by mouth  "Daily. 90 capsule 1   • ondansetron ODT (Zofran ODT) 4 MG disintegrating tablet Place 1 tablet on the tongue Every 8 (Eight) Hours As Needed for Nausea or Vomiting. 30 tablet 0   • [DISCONTINUED] escitalopram (LEXAPRO) 10 MG tablet TAKE 1 TABLET BY MOUTH ONCE DAILY 30 tablet 0   • [DISCONTINUED] hydrOXYzine pamoate (VISTARIL) 50 MG capsule Take 2 capsules by mouth 3 (Three) Times a Day As Needed for Anxiety. 90 capsule 1   • [DISCONTINUED] levothyroxine (SYNTHROID, LEVOTHROID) 100 MCG tablet TAKE 1 TABLET BY MOUTH ONCE DAILY 30 tablet 0   • [DISCONTINUED] brompheniramine-pseudoephedrine-DM 30-2-10 MG/5ML syrup Take 10 mL by mouth 4 (Four) Times a Day As Needed for Congestion or Cough. 240 mL 0   • [DISCONTINUED] doxycycline (VIBRAMYCIN) 100 MG capsule Take 1 capsule by mouth 2 (Two) Times a Day. 14 capsule 0     No current facility-administered medications on file prior to visit.         The following portions of the patient's history were reviewed and updated as appropriate: allergies, current medications, past family history, past medical history, past social history, past surgical history and problem list and are available for review within electronic record    Objective     Result Review :                    Vital Signs:   /84   Pulse 96   Temp 97.1 °F (36.2 °C) (Infrared)   Resp 18   Ht 163.8 cm (64.5\")   Wt (!) 137 kg (301 lb 12.8 oz)   SpO2 96%   BMI 51.00 kg/m²         Physical Exam  Vitals and nursing note reviewed.   Constitutional:       General: She is not in acute distress.     Appearance: Normal appearance. She is well-developed. She is not diaphoretic.   HENT:      Head: Normocephalic and atraumatic.      Right Ear: Tympanic membrane normal.      Left Ear: Tympanic membrane normal.      Nose: Nose normal.      Mouth/Throat:      Lips: Pink.      Mouth: Mucous membranes are moist.      Pharynx: Oropharynx is clear. Uvula midline.   Eyes:      Conjunctiva/sclera: Conjunctivae normal. "   Neck:      Vascular: No JVD.   Cardiovascular:      Rate and Rhythm: Normal rate and regular rhythm.      Heart sounds: Normal heart sounds. No murmur heard.  Pulmonary:      Effort: Pulmonary effort is normal. No respiratory distress.      Breath sounds: Normal breath sounds.   Chest:      Chest wall: No tenderness.   Abdominal:      General: Bowel sounds are normal. There is no distension.      Palpations: Abdomen is soft.      Tenderness: There is no abdominal tenderness.   Musculoskeletal:         General: Normal range of motion.      Cervical back: Normal range of motion.   Skin:     General: Skin is warm and dry.      Coloration: Skin is not pale.      Findings: No erythema.          Neurological:      Mental Status: She is alert and oriented to person, place, and time.   Psychiatric:         Behavior: Behavior normal.         Thought Content: Thought content normal.         Judgment: Judgment normal.     Multiple nevi scattered           Assessment and Plan      Diagnoses and all orders for this visit:    1. Anxiety (Primary)  -     hydrOXYzine pamoate (VISTARIL) 50 MG capsule; Take 2 capsules by mouth 3 (Three) Times a Day As Needed for Anxiety.  Dispense: 90 capsule; Refill: 1  -     Ambulatory Referral to Psychiatry  -     escitalopram (Lexapro) 20 MG tablet; Take 1 tablet by mouth Daily.  Dispense: 30 tablet; Refill: 2  -     Vitamin B12; Future  -     Vitamin D 25 Hydroxy; Future  -     CBC (No Diff); Future  -     Comprehensive Metabolic Panel; Future  Anxiety is slightly improved but still active.  No evidence of psychosis.  Will refer to psychiatry as previous referral did not go through due to location not accepting new patients.  We will also go ahead and increase her Lexapro to 20 mg daily.  Check labs as above  2. Acquired hypothyroidism  -     levothyroxine (SYNTHROID, LEVOTHROID) 100 MCG tablet; Take 1 tablet by mouth Daily.  Dispense: 30 tablet; Refill: 3  -     TSH Rfx On Abnormal To Free  T4; Future  Symptoms fairly stable except for some fatigue.  Continue levothyroxine.  Check TSH.  3. Multiple nevi  -     Ambulatory Referral to Dermatology    4. B12 deficiency  -     Vitamin B12; Future    5. Vitamin D deficiency  -     Vitamin D 25 Hydroxy; Future    6. Fatigue, unspecified type  -     CBC (No Diff); Future  -     Comprehensive Metabolic Panel; Future  -     Hemoglobin A1c; Future    7. Family history of diabetes mellitus  -     Hemoglobin A1c; Future        Follow Up     Patient was given instructions and counseling regarding her condition or for health maintenance advice. Please see specific information pulled into the AVS if appropriate.   Any new medication's adverse effects have been discussed in detail with patient  Patient was encouraged to keep me informed of any acute changes, lack of improvement, or any new concerning symptoms.    Return in about 6 weeks (around 8/2/2022) for Recheck.          Dictated Utilizing Dragon Dictation   Please note that portions of this note were completed with a voice recognition program.   Part of this note may be an electronic transcription/translation of spoken language to printed text using the Dragon Dictation System.

## 2022-06-22 LAB
25(OH)D3 SERPL-MCNC: 26.5 NG/ML (ref 30–100)
TSH SERPL DL<=0.05 MIU/L-ACNC: 3.49 UIU/ML (ref 0.27–4.2)
VIT B12 BLD-MCNC: 337 PG/ML (ref 211–946)

## 2022-06-29 DIAGNOSIS — D72.829 LEUKOCYTOSIS, UNSPECIFIED TYPE: Primary | ICD-10-CM

## 2022-07-05 ENCOUNTER — TELEPHONE (OUTPATIENT)
Dept: INTERNAL MEDICINE | Facility: CLINIC | Age: 27
End: 2022-07-05

## 2022-07-05 NOTE — TELEPHONE ENCOUNTER
[facility] for the patient to return our call, office number was provided      HUB TO READ: Please provide the patient with the below provider comments.       Your labs show that your B12 is on the low end of the normal.  They also show that your vitamin D is low.  Your white blood cell count is slightly elevated.  Your other labs are in acceptable parameters.  I think it would be a good idea for you to take a multivitamin every day and you should start vitamin D3 at 5000 units over-the-counter daily.  I will put an order in for a repeat CBC bloodwork to reevaluate your white blood cell count.  Lets have you stop by in about 2   weeks to have that collected.   Written by EDGARDO Velázquez on 6/29/2022  7:20 AM EDT

## 2022-07-06 ENCOUNTER — TELEPHONE (OUTPATIENT)
Dept: INTERNAL MEDICINE | Facility: CLINIC | Age: 27
End: 2022-07-06

## 2022-07-06 NOTE — TELEPHONE ENCOUNTER
[facility] for the patient to return our call, office number was provided       HUB TO READ: Please provide the patient with the below provider comments.         Your labs show that your B12 is on the low end of the normal.  They also show that your vitamin D is low.  Your white blood cell count is slightly elevated.  Your other labs are in acceptable parameters.  I think it would be a good idea for you to take a multivitamin every day and you should start vitamin D3 at 5000 units over-the-counter daily.  I will put an order in for a repeat CBC bloodwork to reevaluate your white blood cell count.  Lets have you stop by in about 2   weeks to have that collected.   Written by EDGARDO Velázquez on 6/29/2022  7:20 AM EDT

## 2022-07-19 ENCOUNTER — OFFICE VISIT (OUTPATIENT)
Dept: INTERNAL MEDICINE | Facility: CLINIC | Age: 27
End: 2022-07-19

## 2022-07-19 ENCOUNTER — LAB (OUTPATIENT)
Dept: LAB | Facility: HOSPITAL | Age: 27
End: 2022-07-19

## 2022-07-19 VITALS
TEMPERATURE: 97.3 F | HEIGHT: 65 IN | RESPIRATION RATE: 20 BRPM | WEIGHT: 293 LBS | SYSTOLIC BLOOD PRESSURE: 138 MMHG | OXYGEN SATURATION: 97 % | BODY MASS INDEX: 48.82 KG/M2 | HEART RATE: 91 BPM | DIASTOLIC BLOOD PRESSURE: 86 MMHG

## 2022-07-19 DIAGNOSIS — Z11.3 SCREEN FOR STD (SEXUALLY TRANSMITTED DISEASE): Primary | ICD-10-CM

## 2022-07-19 DIAGNOSIS — Z01.419 ENCOUNTER FOR GYNECOLOGICAL EXAMINATION: ICD-10-CM

## 2022-07-19 DIAGNOSIS — Z72.51 UNPROTECTED SEXUAL INTERCOURSE: ICD-10-CM

## 2022-07-19 DIAGNOSIS — D72.829 LEUKOCYTOSIS, UNSPECIFIED TYPE: ICD-10-CM

## 2022-07-19 DIAGNOSIS — Z11.3 SCREEN FOR STD (SEXUALLY TRANSMITTED DISEASE): ICD-10-CM

## 2022-07-19 LAB
BASOPHILS # BLD AUTO: 0.05 10*3/MM3 (ref 0–0.2)
BASOPHILS NFR BLD AUTO: 0.6 % (ref 0–1.5)
DEPRECATED RDW RBC AUTO: 44.4 FL (ref 37–54)
EOSINOPHIL # BLD AUTO: 0.09 10*3/MM3 (ref 0–0.4)
EOSINOPHIL NFR BLD AUTO: 1 % (ref 0.3–6.2)
ERYTHROCYTE [DISTWIDTH] IN BLOOD BY AUTOMATED COUNT: 13.9 % (ref 12.3–15.4)
HAV IGM SERPL QL IA: NORMAL
HBV CORE IGM SERPL QL IA: NORMAL
HBV SURFACE AG SERPL QL IA: NORMAL
HCG INTACT+B SERPL-ACNC: <1 MIU/ML
HCT VFR BLD AUTO: 40 % (ref 34–46.6)
HCV AB SER DONR QL: NORMAL
HGB BLD-MCNC: 13.3 G/DL (ref 12–15.9)
HIV1+2 AB SER QL: NORMAL
IMM GRANULOCYTES # BLD AUTO: 0.04 10*3/MM3 (ref 0–0.05)
IMM GRANULOCYTES NFR BLD AUTO: 0.4 % (ref 0–0.5)
LYMPHOCYTES # BLD AUTO: 3.34 10*3/MM3 (ref 0.7–3.1)
LYMPHOCYTES NFR BLD AUTO: 37.2 % (ref 19.6–45.3)
MCH RBC QN AUTO: 29.6 PG (ref 26.6–33)
MCHC RBC AUTO-ENTMCNC: 33.3 G/DL (ref 31.5–35.7)
MCV RBC AUTO: 89.1 FL (ref 79–97)
MONOCYTES # BLD AUTO: 0.77 10*3/MM3 (ref 0.1–0.9)
MONOCYTES NFR BLD AUTO: 8.6 % (ref 5–12)
NEUTROPHILS NFR BLD AUTO: 4.7 10*3/MM3 (ref 1.7–7)
NEUTROPHILS NFR BLD AUTO: 52.2 % (ref 42.7–76)
NRBC BLD AUTO-RTO: 0 /100 WBC (ref 0–0.2)
PLATELET # BLD AUTO: 252 10*3/MM3 (ref 140–450)
PMV BLD AUTO: 10.4 FL (ref 6–12)
RBC # BLD AUTO: 4.49 10*6/MM3 (ref 3.77–5.28)
WBC NRBC COR # BLD: 8.99 10*3/MM3 (ref 3.4–10.8)

## 2022-07-19 PROCEDURE — 86695 HERPES SIMPLEX TYPE 1 TEST: CPT | Performed by: NURSE PRACTITIONER

## 2022-07-19 PROCEDURE — 87798 DETECT AGENT NOS DNA AMP: CPT | Performed by: NURSE PRACTITIONER

## 2022-07-19 PROCEDURE — 87491 CHLMYD TRACH DNA AMP PROBE: CPT | Performed by: NURSE PRACTITIONER

## 2022-07-19 PROCEDURE — 99213 OFFICE O/P EST LOW 20 MIN: CPT | Performed by: NURSE PRACTITIONER

## 2022-07-19 PROCEDURE — G0432 EIA HIV-1/HIV-2 SCREEN: HCPCS | Performed by: NURSE PRACTITIONER

## 2022-07-19 PROCEDURE — 87591 N.GONORRHOEAE DNA AMP PROB: CPT | Performed by: NURSE PRACTITIONER

## 2022-07-19 PROCEDURE — 87661 TRICHOMONAS VAGINALIS AMPLIF: CPT | Performed by: NURSE PRACTITIONER

## 2022-07-19 PROCEDURE — 87801 DETECT AGNT MULT DNA AMPLI: CPT | Performed by: NURSE PRACTITIONER

## 2022-07-19 PROCEDURE — 86696 HERPES SIMPLEX TYPE 2 TEST: CPT | Performed by: NURSE PRACTITIONER

## 2022-07-19 PROCEDURE — 85025 COMPLETE CBC W/AUTO DIFF WBC: CPT | Performed by: NURSE PRACTITIONER

## 2022-07-19 PROCEDURE — 80074 ACUTE HEPATITIS PANEL: CPT | Performed by: NURSE PRACTITIONER

## 2022-07-19 PROCEDURE — 86592 SYPHILIS TEST NON-TREP QUAL: CPT | Performed by: NURSE PRACTITIONER

## 2022-07-19 PROCEDURE — 84702 CHORIONIC GONADOTROPIN TEST: CPT | Performed by: NURSE PRACTITIONER

## 2022-07-19 NOTE — PROGRESS NOTES
sti  Lilly Segovia presents to [facility] for     Chief Complaint  Chief Complaint   Patient presents with   • sti testing     Not sure if she has been exposed, sexual partner's condom broke, just wants to get tested just in case.          Subjective        History of Present Illness     She is here for STI testing. She had intercourse about 1.5 wekes ago with a new partner and condom broke. She reports he did not ejaculate. She is concerned about the possibility of STI because partner has a jistory of IV drug abuse.   No known STI's though.   She has a slight vaginal odor. No itching or discharge.   Wants full testing.   She also wants HCG as she was ovulating around time of event.     Patient's last menstrual period was 06/28/2022 (approximate).      Review of Systems   Constitutional: Negative for fatigue, fever and unexpected weight loss.   Eyes: Negative for blurred vision, double vision and visual disturbance.   Respiratory: Negative for cough, shortness of breath and wheezing.    Cardiovascular: Negative for chest pain, palpitations and leg swelling.   Gastrointestinal: Negative for abdominal pain, constipation, diarrhea, nausea and vomiting.   Genitourinary: Negative for amenorrhea, breast pain, decreased libido, difficulty urinating, dyspareunia, dysuria, flank pain, frequency, urgency, vaginal bleeding, vaginal discharge and vaginal pain.        Vaginal odor.    Musculoskeletal: Negative for arthralgias and myalgias.   Skin: Negative for color change and rash.   Neurological: Negative for dizziness, weakness and headache.   Hematological: Negative for adenopathy. Does not bruise/bleed easily.         Allergies   Allergen Reactions   • Cetirizine Anaphylaxis and Unknown - High Severity     Current Outpatient Medications on File Prior to Visit   Medication Sig Dispense Refill   • albuterol sulfate  (90 Base) MCG/ACT inhaler Inhale 2 puffs Every 4 (Four) Hours As Needed for  "Wheezing or Shortness of Air. 18 g 2   • escitalopram (Lexapro) 20 MG tablet Take 1 tablet by mouth Daily. 30 tablet 2   • fluticasone (Flonase) 50 MCG/ACT nasal spray 2 sprays into the nostril(s) as directed by provider Daily. 16 g 11   • hydrOXYzine pamoate (VISTARIL) 50 MG capsule Take 2 capsules by mouth 3 (Three) Times a Day As Needed for Anxiety. 90 capsule 1   • levothyroxine (SYNTHROID, LEVOTHROID) 100 MCG tablet Take 1 tablet by mouth Daily. 30 tablet 3   • loratadine (Loratadine Childrens) 5 MG/5ML syrup Take 10 mL by mouth Daily. 240 mL 11   • omeprazole (priLOSEC) 40 MG capsule Take 1 capsule by mouth Daily. 90 capsule 1   • ondansetron ODT (Zofran ODT) 4 MG disintegrating tablet Place 1 tablet on the tongue Every 8 (Eight) Hours As Needed for Nausea or Vomiting. 30 tablet 0     No current facility-administered medications on file prior to visit.         The following portions of the patient's history were reviewed and updated as appropriate: allergies, current medications, past family history, past medical history, past social history, past surgical history and problem list and are available for review within electronic record    Objective         Vital Signs:   /86 (Cuff Size: Large Adult)   Pulse 91   Temp 97.3 °F (36.3 °C) (Infrared)   Resp 20   Ht 163.8 cm (64.5\")   Wt (!) 139 kg (305 lb 9.6 oz)   SpO2 97%   BMI 51.65 kg/m²         Physical Exam  Exam conducted with a chaperone present.   Constitutional:       Appearance: Normal appearance. She is well-developed.   HENT:      Head: Normocephalic and atraumatic.   Eyes:      Conjunctiva/sclera: Conjunctivae normal.   Cardiovascular:      Rate and Rhythm: Normal rate and regular rhythm.      Heart sounds: Normal heart sounds.   Pulmonary:      Effort: Pulmonary effort is normal.      Breath sounds: Normal breath sounds.   Abdominal:      General: Bowel sounds are normal.      Palpations: Abdomen is soft.      Tenderness: There is no " abdominal tenderness.   Genitourinary:     Exam position: Supine.      Labia:         Right: No rash, tenderness, lesion or injury.         Left: No rash, tenderness, lesion or injury.       Vagina: Normal.   Musculoskeletal:         General: Normal range of motion.      Cervical back: Normal range of motion.   Skin:     General: Skin is warm and dry.   Neurological:      Mental Status: She is alert and oriented to person, place, and time.   Psychiatric:         Behavior: Behavior normal.         Thought Content: Thought content normal.         Judgment: Judgment normal.                 Assessment and Plan      Diagnoses and all orders for this visit:    1. Screen for STD (sexually transmitted disease) (Primary)  -     Hepatitis Panel, Acute; Future  -     HIV-1 / O / 2 Ag / Antibody 4th Generation; Future  -     HSV 1 & 2 - Specific Antibody, IgG; Future  -     RPR; Future  -     NuSwab VG+ - Swab, Vagina; Future    2. Unprotected sexual intercourse  -     Hepatitis Panel, Acute; Future  -     HIV-1 / O / 2 Ag / Antibody 4th Generation; Future  -     HSV 1 & 2 - Specific Antibody, IgG; Future  -     RPR; Future  -     NuSwab VG+ - Swab, Vagina; Future  -     hCG, Quantitative, Pregnancy; Future    3. Encounter for gynecological examination  -     Ambulatory Referral to Gynecology    Refer to GYN her request for routine gynecological care.  We will do full STD testing as above.  Encourage safe sex practices.  If any STDs detected we will treat appropriately.      Follow Up {Instructions Charge Capture  Follow-up Communications :23}    Patient was given instructions and counseling regarding her condition or for health maintenance advice. Please see specific information pulled into the AVS if appropriate.   Any new medication's adverse effects have been discussed in detail with patient  Patient was encouraged to keep me informed of any acute changes, lack of improvement, or any new concerning symptoms.    No  follow-ups on file.          Dictated Utilizing Dragon Dictation   Please note that portions of this note were completed with a voice recognition program.   Part of this note may be an electronic transcription/translation of spoken language to printed text using the Dragon Dictation System.

## 2022-07-20 LAB
HSV1 IGG SER IA-ACNC: <0.91 INDEX (ref 0–0.9)
HSV2 IGG SER IA-ACNC: <0.91 INDEX (ref 0–0.9)
RPR SER QL: NORMAL

## 2022-07-21 LAB
A VAGINAE DNA VAG QL NAA+PROBE: NORMAL SCORE
BVAB2 DNA VAG QL NAA+PROBE: NORMAL SCORE
C ALBICANS DNA VAG QL NAA+PROBE: NEGATIVE
C GLABRATA DNA VAG QL NAA+PROBE: NEGATIVE
C TRACH DNA VAG QL NAA+PROBE: NEGATIVE
MEGA1 DNA VAG QL NAA+PROBE: NORMAL SCORE
N GONORRHOEA DNA VAG QL NAA+PROBE: NEGATIVE
T VAGINALIS DNA VAG QL NAA+PROBE: NEGATIVE

## 2022-08-02 ENCOUNTER — TELEPHONE (OUTPATIENT)
Dept: INTERNAL MEDICINE | Facility: CLINIC | Age: 27
End: 2022-08-02

## 2022-08-02 NOTE — TELEPHONE ENCOUNTER
Spoke with the patient and advised her to seek treatment at the [facility] if she is concerned about this. She does report a bright red blood not mixed in her stool but more of a coating. This has happened a few times. She denies any pain, increased density in her stool or constipation. She had a bout of diarrhea for a couple of days prior to this. I advised that the [facility] can do a rectal exam and run some occult tests for her if she doesn't wish to wait for Friday. I also advised her if she does see dark tarry stools then she should seek treatment sooner than Friday. Patient stated that she believes she is okay to wait but again advised what to look out for in the meantime to seek more urgent treatment.

## 2022-08-02 NOTE — TELEPHONE ENCOUNTER
Caller: Lilly Segovia    Relationship: Self    Best call back number:[phone]      What is the best time to reach you: ANYTIME     Who are you requesting to speak with (clinical staff, provider,  specific staff member): CLINICAL     Do you know the name of the person who called: THE PATIENT LILLY    What was the call regarding: THE PATIENT REPORTS SHE HAS EXPERIENCED BLOOD IN HER STOOL TWICE IN THE LAST WEEK, ALONG WITH STOMACH CRAMPING.     Do you require a callback: YES, PLEASE CALL AND ADVISE

## 2022-08-02 NOTE — TELEPHONE ENCOUNTER
Pt called regarding rectal bleeding she has appointment on Friday and wanted to be seen today. Told patient to go to urgent treatment and I would keep her appointment for Friday she would still like a call back today and talk to someone from office.

## 2022-08-03 ENCOUNTER — TELEMEDICINE (OUTPATIENT)
Dept: PSYCHIATRY | Facility: CLINIC | Age: 27
End: 2022-08-03

## 2022-08-03 DIAGNOSIS — F41.0 PANIC DISORDER (EPISODIC PAROXYSMAL ANXIETY): Chronic | ICD-10-CM

## 2022-08-03 DIAGNOSIS — F51.04 INSOMNIA, PSYCHOPHYSIOLOGICAL: Chronic | ICD-10-CM

## 2022-08-03 DIAGNOSIS — F41.1 GENERALIZED ANXIETY DISORDER: Chronic | ICD-10-CM

## 2022-08-03 DIAGNOSIS — F33.0 MILD EPISODE OF RECURRENT MAJOR DEPRESSIVE DISORDER: Chronic | ICD-10-CM

## 2022-08-03 DIAGNOSIS — F43.10 POST TRAUMATIC STRESS DISORDER (PTSD): Primary | Chronic | ICD-10-CM

## 2022-08-03 PROCEDURE — 90792 PSYCH DIAG EVAL W/MED SRVCS: CPT | Performed by: NURSE PRACTITIONER

## 2022-08-03 RX ORDER — PRAZOSIN HYDROCHLORIDE 1 MG/1
1 CAPSULE ORAL NIGHTLY
Qty: 30 CAPSULE | Refills: 0 | Status: SHIPPED | OUTPATIENT
Start: 2022-08-03 | End: 2022-08-29 | Stop reason: SDUPTHER

## 2022-08-03 RX ORDER — ESCITALOPRAM OXALATE 10 MG/1
15 TABLET ORAL DAILY
Qty: 45 TABLET | Refills: 0 | Status: SHIPPED | OUTPATIENT
Start: 2022-08-03 | End: 2022-08-29 | Stop reason: SDUPTHER

## 2022-08-03 RX ORDER — PROPRANOLOL HYDROCHLORIDE 10 MG/1
10-20 TABLET ORAL 2 TIMES DAILY PRN
Qty: 60 TABLET | Refills: 0 | Status: SHIPPED | OUTPATIENT
Start: 2022-08-03 | End: 2022-08-15

## 2022-08-03 NOTE — PROGRESS NOTES
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 medications for this visit.       Review of Symptoms:    Review of Systems   Constitutional: Positive for activity change and fatigue. Negative for appetite change, unexpected weight gain and unexpected weight loss.   Psychiatric/Behavioral: Positive for agitation, decreased concentration, dysphoric mood, sleep disturbance, depressed mood and stress. Negative for behavioral problems, hallucinations, self-injury, suicidal ideas and negative for hyperactivity. The patient is nervous/anxious.          Physical Exam:   not currently breastfeeding. There is no height or weight on file to calculate BMI.     The patient was seen remotely today via a MyChart Video Visit through [facility].  Unable to obtain vital signs due to nature of remote visit.  Height stated at 64.5 inches.  Weight stated at 305 pounds.     Physical Exam  Constitutional:       Appearance: Normal appearance.   Neurological:      Mental Status: She is alert.   Psychiatric:         Attention and Perception: Perception normal. She is inattentive.         Mood and Affect: Affect normal. Mood is anxious.         Speech: Speech normal.         Behavior: Behavior normal. Behavior is cooperative.         Thought Content: Thought content normal. Thought content does not include homicidal or suicidal ideation. Thought content does not include homicidal or  suicidal plan.         Cognition and Memory: Cognition and memory normal.         Judgment: Judgment is impulsive.           Mental Status Exam:   Hygiene:   good  Cooperation:  Cooperative  Eye Contact:  Good  Psychomotor Behavior:  Appropriate  Affect:  Full range  Mood: anxious  Hopelessness: Denies  Speech:  Normal  Thought Process:  Goal directed  Thought Content:  Normal  Suicidal:  None  Homicidal:  None  Hallucinations:  None  Delusion:  None  Memory:  Intact  Orientation:  Person, Place, Time and Situation  Reliability:  good  Insight:  Good  Judgement:  Good  Impulse Control:  Poor  Physical/Medical Issues:  Yes See medical history           PHQ-9 Depression Screening  Little interest or pleasure in doing things? (P) 1   Feeling down, depressed, or hopeless? (P) 1   Trouble falling or staying asleep, or sleeping too much? (P) 1   Feeling tired or having little energy? (P) 2   Poor appetite or overeating? (P) 1   Feeling bad about yourself - or that you are a failure or have let yourself or your family down? (P) 1   Trouble concentrating on things, such as reading the newspaper or watching television? (P) 3   Moving or speaking so slowly that other people could have noticed? Or the opposite - being so fidgety or restless that you have been moving around a lot more than usual? (P) 1   Thoughts that you would be better off dead, or of hurting yourself in some way? (P) 0   PHQ-9 Total Score (P) 11   If you checked off any problems, how difficult have these problems made it for you to do your work, take care of things at home, or get along with other people? (P) Very difficult        PHQ-9 Total Score:  11       ROHAN-7  Feeling nervous, anxious or on edge: (P) Nearly every day  Not being able to stop or control worrying: (P) More than half the days  Worrying too much about different things: (P) Nearly every day  Trouble Relaxing: (P) Nearly every day  Being so restless that it is hard to sit still: (P) Nearly  every day  Feeling afraid as if something awful might happen: (P) Nearly every day  Becoming easily annoyed or irritable: (P) Nearly every day  ROHAN 7 Total Score: (P) 20  If you checked any problems, how difficult have these problems made it for you to do your work, take care of things at home, or get along with other people: (P) Somewhat difficult      PROMIS scale screening tool that patient filled out virtually reviewed by this APRN at today's encounter.     The WAYLON report, request number [id number], of the past 12 months were reviewed.    Past available provider notes reviewed by this APRN at today's encounter.         Lab Results:   Lab on 07/19/2022   Component Date Value Ref Range Status   • Hepatitis B Surface Ag 07/19/2022 Non-Reactive  Non-Reactive Final   • Hep A IgM 07/19/2022 Non-Reactive  Non-Reactive Final   • Hep B C IgM 07/19/2022 Non-Reactive  Non-Reactive Final   • Hepatitis C Ab 07/19/2022 Non-Reactive  Non-Reactive Final   • HIV-1/ HIV-2 07/19/2022 Non-Reactive  Non-Reactive Final    A non-reactive test result does not preclude the possibility of exposure to HIV or infection with HIV. An antibody response to recent exposure may take several months to reach detectable levels.   • HSV 1 IgG, Type Specific 07/19/2022 <0.91  0.00 - 0.90 index Final                                     Negative        <0.91                                   Equivocal 0.91 - 1.09                                   Positive        >1.09   Note: Negative indicates no antibodies detected to   HSV-1. Equivocal may suggest early infection.  If   clinically appropriate, retest at later date. Positive   indicates antibodies detected to HSV-1.   • HSV 2 IgG 07/19/2022 <0.91  0.00 - 0.90 index Final                                     Negative        <0.91                                   Equivocal 0.91 - 1.09                                   Positive        >1.09   Note: Negative indicates no HSV-2 antibodies detected.    Positive indicates HSV-2 antibodies detected.   Equivocal and low positive HSV-2 screens   (Index 0.91-5.00) may be false positive and are   reflexed to supplemental testing in accordance with   CDC guidelines.   • RPR 07/19/2022 Non-Reactive  Non-Reactive Final   • HCG Quantitative 07/19/2022 <1.00  mIU/mL Final   • WBC 07/19/2022 8.99  3.40 - 10.80 10*3/mm3 Final   • RBC 07/19/2022 4.49  3.77 - 5.28 10*6/mm3 Final   • Hemoglobin 07/19/2022 13.3  12.0 - 15.9 g/dL Final   • Hematocrit 07/19/2022 40.0  34.0 - 46.6 % Final   • MCV 07/19/2022 89.1  79.0 - 97.0 fL Final   • MCH 07/19/2022 29.6  26.6 - 33.0 pg Final   • MCHC 07/19/2022 33.3  31.5 - 35.7 g/dL Final   • RDW 07/19/2022 13.9  12.3 - 15.4 % Final   • RDW-SD 07/19/2022 44.4  37.0 - 54.0 fl Final   • MPV 07/19/2022 10.4  6.0 - 12.0 fL Final   • Platelets 07/19/2022 252  140 - 450 10*3/mm3 Final   • Neutrophil % 07/19/2022 52.2  42.7 - 76.0 % Final   • Lymphocyte % 07/19/2022 37.2  19.6 - 45.3 % Final   • Monocyte % 07/19/2022 8.6  5.0 - 12.0 % Final   • Eosinophil % 07/19/2022 1.0  0.3 - 6.2 % Final   • Basophil % 07/19/2022 0.6  0.0 - 1.5 % Final   • Immature Grans % 07/19/2022 0.4  0.0 - 0.5 % Final   • Neutrophils, Absolute 07/19/2022 4.70  1.70 - 7.00 10*3/mm3 Final   • Lymphocytes, Absolute 07/19/2022 3.34 (A) 0.70 - 3.10 10*3/mm3 Final   • Monocytes, Absolute 07/19/2022 0.77  0.10 - 0.90 10*3/mm3 Final   • Eosinophils, Absolute 07/19/2022 0.09  0.00 - 0.40 10*3/mm3 Final   • Basophils, Absolute 07/19/2022 0.05  0.00 - 0.20 10*3/mm3 Final   • Immature Grans, Absolute 07/19/2022 0.04  0.00 - 0.05 10*3/mm3 Final   • nRBC 07/19/2022 0.0  0.0 - 0.2 /100 WBC Final   • Atopobium Vaginae 07/19/2022 Low - 0  Score Final   • BVAB 2 07/19/2022 Low - 0  Score Final   • Megasphaera 1 07/19/2022 Low - 0  Score Final    Calculate total score by adding the 3 individual bacterial  vaginosis (BV) marker scores together.  Total score is  interpreted as follows:  Total score  0-1: Indicates the absence of BV.  Total score   2: Indeterminate for BV. Additional clinical                   data should be evaluated to establish a                   diagnosis.  Total score 3-6: Indicates the presence of BV.  This test was developed and its performance characteristics  determined by [facility].  It has not been cleared or approved  by the Food and Drug Administration.   • Heather Albicans, FIONA 07/19/2022 Negative  Negative Final   • Heather Glabrata, FIONA 07/19/2022 Negative  Negative Final   • Trichomonas vaginosis 07/19/2022 Negative  Negative Final   • Chlamydia trachomatis, FIONA 07/19/2022 Negative  Negative Final   • Neisseria gonorrhoeae, FIONA 07/19/2022 Negative  Negative Final         Assessment & Plan   Diagnoses and all orders for this visit:    1. Post traumatic stress disorder (PTSD) (Primary)  -     escitalopram (Lexapro) 10 MG tablet; Take 1.5 tablets by mouth Daily.  Dispense: 45 tablet; Refill: 0  -     prazosin (MINIPRESS) 1 MG capsule; Take 1 capsule by mouth Every Night.  Dispense: 30 capsule; Refill: 0  -     Ambulatory Referral to Psychiatry    2. Generalized anxiety disorder  -     escitalopram (Lexapro) 10 MG tablet; Take 1.5 tablets by mouth Daily.  Dispense: 45 tablet; Refill: 0  -     propranolol (INDERAL) 10 MG tablet; Take 1-2 tablets by mouth 2 (Two) Times a Day As Needed (anixety/panic).  Dispense: 60 tablet; Refill: 0  -     Ambulatory Referral to Psychiatry    3. Panic disorder (episodic paroxysmal anxiety)  -     escitalopram (Lexapro) 10 MG tablet; Take 1.5 tablets by mouth Daily.  Dispense: 45 tablet; Refill: 0  -     propranolol (INDERAL) 10 MG tablet; Take 1-2 tablets by mouth 2 (Two) Times a Day As Needed (anixety/panic).  Dispense: 60 tablet; Refill: 0  -     Ambulatory Referral to Psychiatry    4. Mild episode of recurrent major depressive disorder (HCC)  -     escitalopram (Lexapro) 10 MG tablet; Take 1.5 tablets by mouth Daily.  Dispense: 45 tablet; Refill:  0  -     Ambulatory Referral to Psychiatry    5. Insomnia, psychophysiological  -     prazosin (MINIPRESS) 1 MG capsule; Take 1 capsule by mouth Every Night.  Dispense: 30 capsule; Refill: 0        Visit Diagnoses:    ICD-10-CM ICD-9-CM   1. Post traumatic stress disorder (PTSD)  F43.10 309.81   2. Generalized anxiety disorder  F41.1 300.02   3. Panic disorder (episodic paroxysmal anxiety)  F41.0 300.01   4. Mild episode of recurrent major depressive disorder (HCC)  F33.0 296.31   5. Insomnia, psychophysiological  F51.04 307.42          GOALS:  Short Term Goals: Patient will be compliant with medication, and patient will have no significant medication related side effects.  Patient will be engaged in psychotherapy as indicated.  Patient will report subjective improvement of symptoms.  Long term goals: To stabilize mood and treat/improve subjective symptoms, the patient will stay out of the hospital, the patient will be at an optimal level of functioning, and the patient will take all medications as prescribed.  The patient verbalized understanding and agreement with goals that were mutually set.      SUICIDE RISK ASSESSMENT: Unalterable demographics and a history of mental health intervention indicate this patient is in a high risk category compared to the general population. At present, the patient denies active SI/HI, intentions, or plans at this time and agrees to seek immediate care should such thoughts develop. The patient verbalizes understanding of how to access emergency care if needed and agrees to do so. Consideration of suicide risk and protective factors such as history, current presentation, individual strengths and weaknesses, psychosocial and environmental stressors and variables, psychiatric illness and symptoms, medical conditions and pain, took place in this interview. Based on those considerations, the patient is determined: within individual baseline and presenting no imminent risk for suicide or  homicide. Other recommendations: The patient does not meet the criteria for inpatient admission and is not a safety risk to self or others at today's visit. Inpatient treatment offers no significant advantages over outpatient treatment for this patient at today's visit.      SAFETY PLAN:  Patient was given ample time for questions and fully participated in treatment planning.  Patient was encouraged to call the clinic with any questions or concerns.  Patient was informed of access to emergency care. If patient were to develop any significant symptomatology, suicidal ideation, homicidal ideation, any concerns, or feel unsafe at any time they are to call the clinic and if unable to get immediate assistance should immediately call 911 or go to the nearest emergency room.  The patient is advised to remove or secure (lock away) all lethal weapons (including guns) and sharps (including razors, scissors, knives, etc.).  All medications (including any prescribed and any over the counter medications) should be stored in a safe and secured location that is not obtainable by children/adolescents.  Patient was given an opportunity and encouraged to ask questions about their medication, illness, and treatment. Patient contracted verbally for the following: If you are experiencing an emotional crisis or have thoughts of harming yourself or others, please go to your nearest local emergency room or call 911. Will continue to re-assess medication response and side effects frequently to establish efficacy and ensure safety. Risks, any black box warnings, side effects, off label usage, and benefits of medication and treatment discussed with patient, along with potential adverse side effects of current and/or newly prescribed medication, alternative treatment options, and OTC medications.  Patient verbalized understanding of potential risks, any off label use of medication, any black box warnings, and any side effects in their own  words. The patient verbalized understanding and agreed to comply with the safety plan discussed in their own words.  Patient given the number to the office. Number also available to the 24- hour suicide hotline.       TREATMENT PLAN: Continue supportive psychotherapy efforts and medications as indicated.  Medication and treatment options, both pharmacological and non-pharmacological treatment options, discussed during today's visit, including any off label use of medication. Patient acknowledged and verbally consented with current treatment plan and was educated on the importance of compliance with treatment and follow-up appointments.          -Discontinue Hydroxyzine 100 mg three times daily as needed for anxiety due to inefficacy  -Begin Propanolol 10 mg twice daily as needed for anxiety/panic  -Begin Prazosin 1 mg nightly for PTSD/insomnia.  Instructed the patient not to use propanolol and prazosin concurrently due to potential additive adverse effects when used together, specifically hypotension.  Instructed the patient to monitor for any signs or symptoms of hypotension and discontinue the Prazosin and Propranolol and notify this APRN if they occur.  The patient verbalizes understanding and endorses that she is agreeable to this.  -Increase Lexapro 15 mg daily for depression/anxiety/PTSD  -Begin psychotherapy.  Referral placed at this time to begin therapy with the [facility].   -Discussed with the patient that while she does have symptoms that are consistent with bipolar disorder and ADHD diagnoses, it appears that these symptoms are occurring secondarily to uncontrolled PTSD.  Discussed with the patient that symptoms/diagnostic criteria of PTSD overlap with symptoms/diagnostic criteria of both bipolar disorder and ADHD.  Discussed with the patient that at this time it appears that previous diagnoses of bipolar disorder and ADHD could likely be inaccurate as symptoms are better  explained by diagnosis of PTSD, but we will continue to monitor and evaluate for definitive diagnoses and adjust treatment/medication recommendations as necessary.  The patient verbalizes understanding and endorses that she is agreeable to this.        MEDICATION ISSUES: Discussed medication options and treatment plan of prescribed medication, any off label use of medication, as well as the risks, benefits, any black box warnings including increased suicidality, and side effects including but not limited to potential falls, dizziness, possible impaired driving, GI side effects (change in appetite, abdominal discomfort, nausea, vomiting, diarrhea, and/or constipation), dry mouth, somnolence, sedation, insomnia, activation, agitation, irritation, tremors, abnormal muscle movements or disorders, headache, sweating, possible bruising or rare bleeding, electrolyte and/or fluid abnormalities, change in blood pressure/heart rate/and or heart rhythm, sexual dysfunction, and metabolic adversities among others. Patient and/or guardian agreeable to call the office with any worsening of symptoms or onset of side effects, or if any concerns or questions arise.  The contact information for the office is made available to the patient and/or guardian.  Patient and/or guardian agreeable to call 911 or go to the nearest ER should they begin having any SI/HI, or if any urgent concerns arise. No medication side effects or related complaints today.                        [facility] No Show Policy:  We understand unexpected circumstances arise; however, anytime you miss your appointment we are unable to provide you appropriate care.  In addition, each appointment missed could have been used to provide care for others.  We ask that you call at least 24 hours in advance to cancel or reschedule an appointment.  We would like to take this opportunity to remind you of our policy stating patients who miss THREE or more  appointments without cancelling or rescheduling 24 hours in advance of the appointment may be subject to cancellation of any further visits with our clinic and recommendation to seek in-person services/visits.    Please call [phone] to reschedule your appointment. If there are reasons that make it difficult for you to keep the appointments, please call and let us know how we can help.  Please understand that medication prescribing will not continue without seeing your provider.      [facility]'s No Show Policy reviewed with patient at today's visit. Patient verbalized understanding of this policy. Discussed with patient that in the event that there are three or more no show visits, it will be recommended that they pursue in-person services/visits as noncompliance with telehealth visits indicates that patient is not an appropriate candidate for telemedicine and would likely be more appropriate for in-person services/visits. Patient verbalizes understanding and is agreeable to this.             MEDS ORDERED DURING VISIT:  New Medications Ordered This Visit   Medications   • escitalopram (Lexapro) 10 MG tablet     Sig: Take 1.5 tablets by mouth Daily.     Dispense:  45 tablet     Refill:  0   • propranolol (INDERAL) 10 MG tablet     Sig: Take 1-2 tablets by mouth 2 (Two) Times a Day As Needed (anixety/panic).     Dispense:  60 tablet     Refill:  0   • prazosin (MINIPRESS) 1 MG capsule     Sig: Take 1 capsule by mouth Every Night.     Dispense:  30 capsule     Refill:  0       Return in about 2 weeks (around 8/17/2022), or if symptoms worsen or fail to improve, for Recheck.        Patient will follow-up in 2 weeks, highly encouraged the patient if she had any questions or concerns to contact the behavioral health virtual clinic for sooner appointment patient verbalized understanding.      Functional Status: Severe impairment    Prognosis: Guarded with Ongoing Treatment             This  document has been electronically signed by EDGARDO Tamez  August 3, 2022 19:40 EDT    Part of this note may be an electronic transcription/translation of spoken language to printed text using the Dragon Dictation System.

## 2022-08-05 ENCOUNTER — OFFICE VISIT (OUTPATIENT)
Dept: INTERNAL MEDICINE | Facility: CLINIC | Age: 27
End: 2022-08-05

## 2022-08-05 VITALS
BODY MASS INDEX: 48.82 KG/M2 | OXYGEN SATURATION: 97 % | DIASTOLIC BLOOD PRESSURE: 84 MMHG | TEMPERATURE: 97.1 F | SYSTOLIC BLOOD PRESSURE: 136 MMHG | HEART RATE: 76 BPM | HEIGHT: 65 IN | RESPIRATION RATE: 18 BRPM | WEIGHT: 293 LBS

## 2022-08-05 DIAGNOSIS — R11.0 NAUSEA: ICD-10-CM

## 2022-08-05 DIAGNOSIS — Q60.0 CONGENITAL ABSENCE OF ONE KIDNEY: ICD-10-CM

## 2022-08-05 DIAGNOSIS — R19.7 DIARRHEA OF PRESUMED INFECTIOUS ORIGIN: Primary | ICD-10-CM

## 2022-08-05 PROCEDURE — 99214 OFFICE O/P EST MOD 30 MIN: CPT | Performed by: NURSE PRACTITIONER

## 2022-08-05 RX ORDER — AZITHROMYCIN 200 MG/5ML
500 POWDER, FOR SUSPENSION ORAL DAILY
Qty: 37.5 ML | Refills: 0 | Status: SHIPPED | OUTPATIENT
Start: 2022-08-05 | End: 2022-08-08

## 2022-08-05 RX ORDER — ONDANSETRON 4 MG/1
4 TABLET, ORALLY DISINTEGRATING ORAL EVERY 8 HOURS PRN
Qty: 30 TABLET | Refills: 0 | Status: SHIPPED | OUTPATIENT
Start: 2022-08-05 | End: 2022-08-22

## 2022-08-05 NOTE — PROGRESS NOTES
Lilly Segovia presents to [facility] for     Chief Complaint  Chief Complaint   Patient presents with   • Rectal Bleeding     Watery diarrhea, stomach cramps, nausea and intermittent constipation - Blood in stool, bright red (1 week)        Subjective        Rectal Bleeding  This is a new problem. The current episode started in the past 7 days. The problem occurs intermittently. Associated symptoms include abdominal pain, a change in bowel habit, fatigue and nausea. Pertinent negatives include no anorexia, arthralgias, congestion, diaphoresis, fever, headaches, joint swelling, rash, swollen glands, urinary symptoms, vomiting or weakness. Nothing aggravates the symptoms. She has tried nothing for the symptoms. The treatment provided no relief.   Diarrhea   This is a new problem. The current episode started in the past 7 days. Episode frequency: 1-3/d. The problem has been waxing and waning. The stool consistency is described as watery and bloody. Associated symptoms include abdominal pain. Pertinent negatives include no arthralgias, fever, headaches or vomiting. Nothing aggravates the symptoms. There are no known risk factors. She has tried nothing for the symptoms. The treatment provided no relief. Her past medical history is significant for bowel resection. There is no history of a recent abdominal surgery.     She is concerned that previous imaging has shown that she only has 1 kidney.  She had a CT abdomen pelvis in 2015 which showed no visible left kidney.  She has normal renal function per most recent labs  Lab Results   Component Value Date    GLUCOSE 94 06/21/2022    BUN 8 06/21/2022    CREATININE 0.53 (L) 06/21/2022    EGFRIFNONA >60 10/11/2021    EGFRIFAFRI >60 10/11/2021    BCR 15.1 06/21/2022    K 4.0 06/21/2022    CO2 23.0 06/21/2022    CALCIUM 10.1 06/21/2022    ALBUMIN 4.90 06/21/2022    AST 16 06/21/2022    ALT 20 06/21/2022           Review of Systems   Constitutional:  "Positive for fatigue. Negative for diaphoresis and fever.   HENT: Negative for congestion and swollen glands.    Gastrointestinal: Positive for abdominal pain, blood in stool, change in bowel habit, constipation, diarrhea, hematochezia and nausea. Negative for anorexia and vomiting.   Musculoskeletal: Negative for arthralgias and joint swelling.   Skin: Negative for rash.   Neurological: Negative for weakness.         Allergies   Allergen Reactions   • Cetirizine Anaphylaxis and Unknown - High Severity     Current Outpatient Medications on File Prior to Visit   Medication Sig Dispense Refill   • albuterol sulfate  (90 Base) MCG/ACT inhaler Inhale 2 puffs Every 4 (Four) Hours As Needed for Wheezing or Shortness of Air. 18 g 2   • escitalopram (Lexapro) 10 MG tablet Take 1.5 tablets by mouth Daily. 45 tablet 0   • fluticasone (Flonase) 50 MCG/ACT nasal spray 2 sprays into the nostril(s) as directed by provider Daily. 16 g 11   • levothyroxine (SYNTHROID, LEVOTHROID) 100 MCG tablet Take 1 tablet by mouth Daily. 30 tablet 3   • loratadine (Loratadine Childrens) 5 MG/5ML syrup Take 10 mL by mouth Daily. 240 mL 11   • omeprazole (priLOSEC) 40 MG capsule Take 1 capsule by mouth Daily. 90 capsule 1   • prazosin (MINIPRESS) 1 MG capsule Take 1 capsule by mouth Every Night. 30 capsule 0     No current facility-administered medications on file prior to visit.         The following portions of the patient's history were reviewed and updated as appropriate: allergies, current medications, past family history, past medical history, past social history, past surgical history and problem list and are available for review within electronic record    Objective     Result Review :                    Vital Signs:   /84   Pulse 76   Temp 97.1 °F (36.2 °C) (Infrared)   Resp 18   Ht 163.8 cm (64.5\")   Wt (!) 138 kg (303 lb 6.4 oz)   SpO2 97%   BMI 51.27 kg/m²         Physical Exam  Vitals and nursing note reviewed. "   Constitutional:       Appearance: Normal appearance. She is well-developed.   HENT:      Head: Normocephalic and atraumatic.   Eyes:      Conjunctiva/sclera: Conjunctivae normal.   Cardiovascular:      Rate and Rhythm: Normal rate and regular rhythm.      Heart sounds: Normal heart sounds.   Pulmonary:      Effort: Pulmonary effort is normal.      Breath sounds: Normal breath sounds.   Abdominal:      General: Bowel sounds are normal. There is no distension.      Palpations: Abdomen is soft. There is no mass.      Tenderness: There is no abdominal tenderness. There is no guarding or rebound.      Hernia: No hernia is present.   Musculoskeletal:         General: Normal range of motion.      Cervical back: Normal range of motion.   Skin:     General: Skin is warm and dry.   Neurological:      Mental Status: She is alert and oriented to person, place, and time.   Psychiatric:         Behavior: Behavior normal.         Thought Content: Thought content normal.         Judgment: Judgment normal.                 Assessment and Plan      Diagnoses and all orders for this visit:    1. Diarrhea of presumed infectious origin (Primary)  -     Fecal Leukocytes - Stool, Per Rectum; Future  -     Stool Culture (Reference Lab) - Stool, Per Rectum; Future  -     Occult Blood X 1, Stool - Stool, Per Rectum; Future  -     Ova & Parasite Examination - Stool, Per Rectum; Future  -     azithromycin (Zithromax) 200 MG/5ML suspension; Take 12.5 mL by mouth Daily for 3 days.  Dispense: 37.5 mL; Refill: 0  Suspect this may be infectious.  We will treat with azithromycin and collect stool studies as above.  She had been having small amount of blood in her bowel movement over the last week intermittently.  If increasing amounts of blood noted in stool, she will follow-up immediately or present to the ER for further evaluation.  Encourage clear liquids for 24 hours then advance to brat diet x24 hours and then advance as tolerated.    2.  Congenital absence of one kidney  We will continue to monitor take function routinely.  3. Nausea  -     ondansetron ODT (Zofran ODT) 4 MG disintegrating tablet; Place 1 tablet on the tongue Every 8 (Eight) Hours As Needed for Nausea or Vomiting.  Dispense: 30 tablet; Refill: 0  Zofran as needed      Follow Up     Patient was given instructions and counseling regarding her condition or for health maintenance advice. Please see specific information pulled into the AVS if appropriate.   Any new medication's adverse effects have been discussed in detail with patient  Patient was encouraged to keep me informed of any acute changes, lack of improvement, or any new concerning symptoms.    Return if symptoms worsen or fail to improve.          Dictated Utilizing Dragon Dictation   Please note that portions of this note were completed with a voice recognition program.   Part of this note may be an electronic transcription/translation of spoken language to printed text using the Dragon Dictation System.

## 2022-08-15 ENCOUNTER — TELEMEDICINE (OUTPATIENT)
Dept: PSYCHIATRY | Facility: CLINIC | Age: 27
End: 2022-08-15

## 2022-08-15 DIAGNOSIS — F43.10 POST TRAUMATIC STRESS DISORDER (PTSD): Primary | Chronic | ICD-10-CM

## 2022-08-15 DIAGNOSIS — F51.04 INSOMNIA, PSYCHOPHYSIOLOGICAL: Chronic | ICD-10-CM

## 2022-08-15 DIAGNOSIS — F41.0 PANIC DISORDER (EPISODIC PAROXYSMAL ANXIETY): Chronic | ICD-10-CM

## 2022-08-15 DIAGNOSIS — F41.1 GENERALIZED ANXIETY DISORDER: Chronic | ICD-10-CM

## 2022-08-15 DIAGNOSIS — F33.0 MILD EPISODE OF RECURRENT MAJOR DEPRESSIVE DISORDER: Chronic | ICD-10-CM

## 2022-08-15 PROCEDURE — 99214 OFFICE O/P EST MOD 30 MIN: CPT | Performed by: NURSE PRACTITIONER

## 2022-08-15 RX ORDER — BUSPIRONE HYDROCHLORIDE 10 MG/1
TABLET ORAL
Qty: 60 TABLET | Refills: 0 | Status: SHIPPED | OUTPATIENT
Start: 2022-08-15 | End: 2022-08-29

## 2022-08-15 RX ORDER — CLONIDINE HYDROCHLORIDE 0.1 MG/1
0.1 TABLET ORAL 2 TIMES DAILY PRN
Qty: 60 TABLET | Refills: 0 | Status: SHIPPED | OUTPATIENT
Start: 2022-08-15 | End: 2022-08-29

## 2022-08-15 NOTE — PROGRESS NOTES
"This provider is located at the [facility] (through [facility]), [address], [facility], [address] using a secure [employer] Video Visit through [employer]. Patient is being seen remotely via telehealth at their home address in [place], and stated they are in a secure environment for this session. The patient's condition being diagnosed/treated is appropriate for telemedicine. The provider identified herself as well as her credentials. The patient, and/or patients guardian, consent to be seen remotely, and when consent is given they understand that the consent allows for patient identifiable information to be sent to a third party as needed. They may refuse to be seen remotely at any time. The electronic data is encrypted and password protected, and the patient and/or guardian has been advised of the potential risks to privacy not withstanding such measures.    You have chosen to receive care through a telehealth visit.  Do you consent to use a video/audio connection for your medical care today? Yes     Subjective   Lilly Segovia is a 26 y.o. female who is here today for medication management follow up.    Chief Complaint: PTSD, anxiety, panic attacks, depression, insomnia     History of Present Illness:  The patient describes her mood as \"more anxious\" over the last few weeks.  The patient endorses that her anxiety and panic attacks have worsened over the past couple of weeks.  The patient endorses that her anxiety was bad prior to this, but endorses that it has been severe recently.  The patient endorses that rather than having periods of increased anxiety she is now feeling anxious constantly.  The patient endorses that she constantly feels restless and states that panic attacks have also been significantly worse.  The patient endorses that panic attacks used to last only a few minutes at most, but states that she had a panic attack recently that lasted approximately 1 hour.  The " "patient endorses that she has been having severe anxiety and panic at work.  She states that her anxiety and panic have been to the point that she has had to go outside several times in the day in order to try to calm herself down because she felt like she was going to pass out from feelings of panic.  The patient endorses that she is unsure what is triggering her or causing anxiety and panic to be so much worse, but endorses that it has been very severe recently.  The patient states that her anxiety typically is not as bad in the morning but tends to increase as the day goes on.  She states that there may be a couple hours over the day that she does not feel quite as anxious, but states that then anxiety will \"come out of nowhere\" and states that it does not go away until she is able to go to sleep.  The patient endorses that sleep has actually been better recently.  The patient endorses that since starting the prazosin she is falling asleep much easier and sleeping much better throughout the night.  The patient denies any nightmares or bad dreams.  The patient states that she has been using the propanolol as needed for anxiety and endorses that it is completely ineffective.  The patient states that her depression has continued to be well controlled with most part, but states that as her anxiety increases she does notice that her depression tends to increase as well.  The patient states that some days are better than others, but endorses that as her anxiety has been increased her mood has been somewhat worse.  The patient reports her appetite as fluctuating.  The patient endorses that her appetite has always had a tendency to fluctuate between overeating and not eating enough.  The patient states that as her anxiety has been increased recently her appetite has been significantly decreased.  The patient states that she does not really feel hungry whenever she is feeling more anxious/panic.  The patient denies any " "new medical problems or changes in medications since last appointment with this facility.  The patient reports compliance with current medication regimen.  The patient denies any current side effects from her current medication regimen.  The patient states that she has tolerated the increased dose of Lexapro well.  The patient states that when she increased from 10 to 20 mg in the past it caused an excessive increase in fatigue, but states that she tolerated the increased dose from 10 to 15 mg well and denies any increasing fatigue at this time.  The patient endorses feeling very frustrated that symptoms actually been worse recently rather than improving.  She endorses that she feels as if she is \"never going to get better\" and explains how frustrating it is to feel how she is currently.  The patient denies any abnormal muscle movements or tics.  The patient rates her depression at a 3-4/10 on a 0-10 scale, with 10 being the worst.  The patient rates her anxiety at a 10/10 on a 0-10 scale, with 10 being the worst.  The patient rates her PTSD at a 9/10 on a 0-10 scale, with 10 being the worst.  The patient rates hopelessness at a 0/10 on a 0-10 scale with 10 being the worst.  The patient denies suicidal ideations and homicidal ideations, and is convincing.  The patient denies any auditory hallucinations or visual hallucinations.  The patient does not endorse significant symptoms consistent with bipolar disorder or a psychotic illness.                LMP:  3 weeks ago.  The patient denies any chance of pregnancy at this time.  The patient was educated that her prescribed medications can have potential risk to a developing fetus. The patient is advised to contact this APRN/this office if she becomes pregnant or plans to become pregnant.  Pt verbalizes understanding and acknowledged agreement with this plan in her own words.        Prior Psychiatric Medications:  Propanolol 10-20 mg twice daily as needed for " anxiety/panic - ineffective   Hydroxyzine 100 mg three times daily as needed for anxiety/panic attacks - mild partial efficacy  Wellbutrin - increased anxiety and panic   Vyvanse - reports she was on this medication for ADHD from approximately age [age].  Reports that it did seem to be effective for focus/concentration.  Reports that the Wellbutrin and Vyvanse are the only 2 medications other than her current psychotropic medication regimen (Lexapro and Hydroxyzine) that she has taken as an adult prior to establishing care with this APRN.  The patient endorses that she has taken several psychotropic medications throughout childhood.  Reports that she cannot recall the names of all of these medications, but states that she can recall she was prescribed Lithium and Ativan at one point.      The following portions of the patient's history were reviewed and updated as appropriate: allergies, current medications, past family history, past medical history, past social history, past surgical history and problem list.    Review of Systems   Constitutional: Positive for activity change and appetite change. Negative for fatigue and unexpected weight change.   Psychiatric/Behavioral: Positive for decreased concentration and dysphoric mood. Negative for agitation, behavioral problems, confusion, hallucinations, self-injury, sleep disturbance and suicidal ideas. The patient is nervous/anxious. The patient is not hyperactive.        Objective   Physical Exam  Constitutional:       Appearance: Normal appearance.   Neurological:      Mental Status: She is alert.   Psychiatric:         Attention and Perception: Attention and perception normal.         Mood and Affect: Mood is anxious and depressed. Affect is angry (irritable/agitated).         Speech: Speech normal.         Behavior: Behavior is agitated. Behavior is cooperative.         Thought Content: Thought content normal. Thought content does not include homicidal or suicidal  ideation. Thought content does not include homicidal or suicidal plan.         Cognition and Memory: Cognition and memory normal.         Judgment: Judgment normal.       not currently breastfeeding.    The patient was seen remotely today via a [facility]Chart [facility] through [facility].  Unable to obtain vital signs due to nature of remote visit.  Height stated at 64.5 inches.  Weight stated at 303 pounds.     Allergies   Allergen Reactions   • Cetirizine Anaphylaxis and Unknown - High Severity       Recent Results (from the past 2016 hour(s))   TSH Rfx On Abnormal To Free T4    Collection Time: 06/21/22 11:43 AM    Specimen: Blood   Result Value Ref Range    TSH 3.490 0.270 - 4.200 uIU/mL   Vitamin B12    Collection Time: 06/21/22 11:43 AM    Specimen: Blood   Result Value Ref Range    Vitamin B-12 337 211 - 946 pg/mL   Vitamin D 25 Hydroxy    Collection Time: 06/21/22 11:43 AM    Specimen: Blood   Result Value Ref Range    25 Hydroxy, Vitamin D 26.5 (L) 30.0 - 100.0 ng/ml   CBC (No Diff)    Collection Time: 06/21/22 11:43 AM    Specimen: Blood   Result Value Ref Range    WBC 10.99 (H) 3.40 - 10.80 10*3/mm3    RBC 4.78 3.77 - 5.28 10*6/mm3    Hemoglobin 14.1 12.0 - 15.9 g/dL    Hematocrit 41.6 34.0 - 46.6 %    MCV 87.0 79.0 - 97.0 fL    MCH 29.5 26.6 - 33.0 pg    MCHC 33.9 31.5 - 35.7 g/dL    RDW 13.6 12.3 - 15.4 %    RDW-SD 42.9 37.0 - 54.0 fl    MPV 10.4 6.0 - 12.0 fL    Platelets 287 140 - 450 10*3/mm3   Comprehensive Metabolic Panel    Collection Time: 06/21/22 11:43 AM    Specimen: Blood   Result Value Ref Range    Glucose 94 65 - 99 mg/dL    BUN 8 6 - 20 mg/dL    Creatinine 0.53 (L) 0.57 - 1.00 mg/dL    Sodium 138 136 - 145 mmol/L    Potassium 4.0 3.5 - 5.2 mmol/L    Chloride 101 98 - 107 mmol/L    CO2 23.0 22.0 - 29.0 mmol/L    Calcium 10.1 8.6 - 10.5 mg/dL    Total Protein 8.0 6.0 - 8.5 g/dL    Albumin 4.90 3.50 - 5.20 g/dL    ALT (SGPT) 20 1 - 33 U/L    AST (SGOT) 16 1 - 32 U/L    Alkaline Phosphatase 66 39 - 117  U/L    Total Bilirubin 0.3 0.0 - 1.2 mg/dL    Globulin 3.1 gm/dL    A/G Ratio 1.6 g/dL    BUN/Creatinine Ratio 15.1 7.0 - 25.0    Anion Gap 14.0 5.0 - 15.0 mmol/L    eGFR 129.4 >60.0 mL/min/1.73   Hemoglobin A1c    Collection Time: 06/21/22 11:43 AM    Specimen: Blood   Result Value Ref Range    Hemoglobin A1C 5.40 4.80 - 5.60 %   Hepatitis Panel, Acute    Collection Time: 07/19/22  8:45 AM    Specimen: Blood   Result Value Ref Range    Hepatitis B Surface Ag Non-Reactive Non-Reactive    Hep A IgM Non-Reactive Non-Reactive    Hep B C IgM Non-Reactive Non-Reactive    Hepatitis C Ab Non-Reactive Non-Reactive   HIV-1 / O / 2 Ag / Antibody 4th Generation    Collection Time: 07/19/22  8:45 AM    Specimen: Blood   Result Value Ref Range    HIV-1/ HIV-2 Non-Reactive Non-Reactive   HSV 1 & 2 - Specific Antibody, IgG    Collection Time: 07/19/22  8:45 AM    Specimen: Blood   Result Value Ref Range    HSV 1 IgG, Type Specific <0.91 0.00 - 0.90 index    HSV 2 IgG <0.91 0.00 - 0.90 index   RPR    Collection Time: 07/19/22  8:45 AM    Specimen: Blood   Result Value Ref Range    RPR Non-Reactive Non-Reactive   hCG, Quantitative, Pregnancy    Collection Time: 07/19/22  8:45 AM    Specimen: Blood   Result Value Ref Range    HCG Quantitative <1.00 mIU/mL   CBC Auto Differential    Collection Time: 07/19/22  8:45 AM    Specimen: Blood   Result Value Ref Range    WBC 8.99 3.40 - 10.80 10*3/mm3    RBC 4.49 3.77 - 5.28 10*6/mm3    Hemoglobin 13.3 12.0 - 15.9 g/dL    Hematocrit 40.0 34.0 - 46.6 %    MCV 89.1 79.0 - 97.0 fL    MCH 29.6 26.6 - 33.0 pg    MCHC 33.3 31.5 - 35.7 g/dL    RDW 13.9 12.3 - 15.4 %    RDW-SD 44.4 37.0 - 54.0 fl    MPV 10.4 6.0 - 12.0 fL    Platelets 252 140 - 450 10*3/mm3    Neutrophil % 52.2 42.7 - 76.0 %    Lymphocyte % 37.2 19.6 - 45.3 %    Monocyte % 8.6 5.0 - 12.0 %    Eosinophil % 1.0 0.3 - 6.2 %    Basophil % 0.6 0.0 - 1.5 %    Immature Grans % 0.4 0.0 - 0.5 %    Neutrophils, Absolute 4.70 1.70 - 7.00  10*3/mm3    Lymphocytes, Absolute 3.34 (H) 0.70 - 3.10 10*3/mm3    Monocytes, Absolute 0.77 0.10 - 0.90 10*3/mm3    Eosinophils, Absolute 0.09 0.00 - 0.40 10*3/mm3    Basophils, Absolute 0.05 0.00 - 0.20 10*3/mm3    Immature Grans, Absolute 0.04 0.00 - 0.05 10*3/mm3    nRBC 0.0 0.0 - 0.2 /100 WBC   NuSwab VG+ - Swab, Vagina    Collection Time: 07/19/22 10:06 AM    Specimen: Vagina; Swab   Result Value Ref Range    Atopobium Vaginae Low - 0 Score    BVAB 2 Low - 0 Score    Megasphaera 1 Low - 0 Score    Candida Albicans, FIONA Negative Negative    Candida Glabrata, FIONA Negative Negative    Trichomonas vaginosis Negative Negative    Chlamydia trachomatis, FIONA Negative Negative    Neisseria gonorrhoeae, FIONA Negative Negative       Current Medications:   Current Outpatient Medications   Medication Sig Dispense Refill   • albuterol sulfate  (90 Base) MCG/ACT inhaler Inhale 2 puffs Every 4 (Four) Hours As Needed for Wheezing or Shortness of Air. 18 g 2   • escitalopram (Lexapro) 10 MG tablet Take 1.5 tablets by mouth Daily. 45 tablet 0   • fluticasone (Flonase) 50 MCG/ACT nasal spray 2 sprays into the nostril(s) as directed by provider Daily. 16 g 11   • levothyroxine (SYNTHROID, LEVOTHROID) 100 MCG tablet Take 1 tablet by mouth Daily. 30 tablet 3   • loratadine (Loratadine Childrens) 5 MG/5ML syrup Take 10 mL by mouth Daily. 240 mL 11   • omeprazole (priLOSEC) 40 MG capsule Take 1 capsule by mouth Daily. 90 capsule 1   • ondansetron ODT (Zofran ODT) 4 MG disintegrating tablet Place 1 tablet on the tongue Every 8 (Eight) Hours As Needed for Nausea or Vomiting. 30 tablet 0   • prazosin (MINIPRESS) 1 MG capsule Take 1 capsule by mouth Every Night. 30 capsule 0   • busPIRone (BUSPAR) 10 MG tablet Take 0.5 tablet by mouth Two (2) times Daily x1 week, then increase to 1 tablet by mouth Two (2) times Daily. 60 tablet 0   • cloNIDine (CATAPRES) 0.1 MG tablet Take 1 tablet by mouth 2 (Two) Times a Day As Needed  (anxiety/panic). 60 tablet 0     No current facility-administered medications for this visit.       Mental Status Exam:   Hygiene:   good  Cooperation:  Cooperative  Eye Contact:  Good  Psychomotor Behavior:  Restless  Affect:  Agitated/Angry  Hopelessness: Denies  Speech:  Normal  Thought Process:  Goal directed  Thought Content:  Normal  Suicidal:  None  Homicidal:  None  Hallucinations:  None  Delusion:  None  Memory:  Intact  Orientation:  Person, Place, Time and Situation  Reliability:  good  Insight:  Fair  Judgement:  Good  Impulse Control:  Good  Physical/Medical Issues:  Yes See medical history    PHQ-9 Depression Screening  Little interest or pleasure in doing things? (P) 1   Feeling down, depressed, or hopeless? (P) 1   Trouble falling or staying asleep, or sleeping too much? (P) 0   Feeling tired or having little energy? (P) 1   Poor appetite or overeating? (P) 3   Feeling bad about yourself - or that you are a failure or have let yourself or your family down? (P) 1   Trouble concentrating on things, such as reading the newspaper or watching television? (P) 3   Moving or speaking so slowly that other people could have noticed? Or the opposite - being so fidgety or restless that you have been moving around a lot more than usual? (P) 1   Thoughts that you would be better off dead, or of hurting yourself in some way? (P) 0   PHQ-9 Total Score (P) 11   If you checked off any problems, how difficult have these problems made it for you to do your work, take care of things at home, or get along with other people? (P) Very difficult        PHQ-Score Total:  PHQ-9 Total Score: (P) 11    ROHAN-7  Feeling nervous, anxious or on edge: (P) Nearly every day  Not being able to stop or control worrying: (P) More than half the days  Worrying too much about different things: (P) Nearly every day  Trouble Relaxing: (P) Nearly every day  Being so restless that it is hard to sit still: (P) More than half the days  Feeling  afraid as if something awful might happen: (P) Several days  Becoming easily annoyed or irritable: (P) Nearly every day  ROHAN 7 Total Score: (P) 17  If you checked any problems, how difficult have these problems made it for you to do your work, take care of things at home, or get along with other people: (P) Very difficult        PROMIS scale screening tool that patient filled out virtually reviewed by this APRN at today's encounter.       Assessment & Plan   Diagnoses and all orders for this visit:    1. Post traumatic stress disorder (PTSD) (Primary)  -     busPIRone (BUSPAR) 10 MG tablet; Take 0.5 tablet by mouth Two (2) times Daily x1 week, then increase to 1 tablet by mouth Two (2) times Daily.  Dispense: 60 tablet; Refill: 0    2. Generalized anxiety disorder  -     busPIRone (BUSPAR) 10 MG tablet; Take 0.5 tablet by mouth Two (2) times Daily x1 week, then increase to 1 tablet by mouth Two (2) times Daily.  Dispense: 60 tablet; Refill: 0  -     cloNIDine (CATAPRES) 0.1 MG tablet; Take 1 tablet by mouth 2 (Two) Times a Day As Needed (anxiety/panic).  Dispense: 60 tablet; Refill: 0    3. Panic disorder (episodic paroxysmal anxiety)  -     busPIRone (BUSPAR) 10 MG tablet; Take 0.5 tablet by mouth Two (2) times Daily x1 week, then increase to 1 tablet by mouth Two (2) times Daily.  Dispense: 60 tablet; Refill: 0  -     cloNIDine (CATAPRES) 0.1 MG tablet; Take 1 tablet by mouth 2 (Two) Times a Day As Needed (anxiety/panic).  Dispense: 60 tablet; Refill: 0    4. Mild episode of recurrent major depressive disorder (HCC)  -     busPIRone (BUSPAR) 10 MG tablet; Take 0.5 tablet by mouth Two (2) times Daily x1 week, then increase to 1 tablet by mouth Two (2) times Daily.  Dispense: 60 tablet; Refill: 0    5. Insomnia, psychophysiological            Visit Diagnoses:    ICD-10-CM ICD-9-CM   1. Post traumatic stress disorder (PTSD)  F43.10 309.81   2. Generalized anxiety disorder  F41.1 300.02   3. Panic disorder (episodic  paroxysmal anxiety)  F41.0 300.01   4. Mild episode of recurrent major depressive disorder (HCC)  F33.0 296.31   5. Insomnia, psychophysiological  F51.04 307.42       GOALS:  Short Term Goals: Patient will be compliant with medication, and patient will have no significant medication related side effects.  Patient will be engaged in psychotherapy as indicated.  Patient will report subjective improvement of symptoms.  Long term goals: To stabilize mood and treat/improve subjective symptoms, the patient will stay out of the hospital, the patient will be at an optimal level of functioning, and the patient will take all medications as prescribed.  The patient verbalized understanding and agreement with goals that were mutually set.      SUICIDE RISK ASSESSMENT: Unalterable demographics and a history of mental health intervention indicate this patient is in a high risk category compared to the general population. At present, the patient denies active SI/HI, intentions, or plans at this time and agrees to seek immediate care should such thoughts develop. The patient verbalizes understanding of how to access emergency care if needed and agrees to do so. Consideration of suicide risk and protective factors such as history, current presentation, individual strengths and weaknesses, psychosocial and environmental stressors and variables, psychiatric illness and symptoms, medical conditions and pain, took place in this interview. Based on those considerations, the patient is determined: within individual baseline and presenting no imminent risk for suicide or homicide. Other recommendations: The patient does not meet the criteria for inpatient admission and is not a safety risk to self or others at today's visit. Inpatient treatment offers no significant advantages over outpatient treatment for this patient at today's visit.      SAFETY PLAN:  Patient was given ample time for questions and fully participated in treatment  planning.  Patient was encouraged to call the clinic with any questions or concerns.  Patient was informed of access to emergency care. If patient were to develop any significant symptomatology, suicidal ideation, homicidal ideation, any concerns, or feel unsafe at any time they are to call the clinic and if unable to get immediate assistance should immediately call 911 or go to the nearest emergency room.  The patient is advised to remove or secure (lock away) all lethal weapons (including guns) and sharps (including razors, scissors, knives, etc.).  All medications (including any prescribed and any over the counter medications) should be stored in a safe and secured location that is not obtainable by children/adolescents.  Patient was given an opportunity and encouraged to ask questions about their medication, illness, and treatment. Patient contracted verbally for the following: If you are experiencing an emotional crisis or have thoughts of harming yourself or others, please go to your nearest local emergency room or call 911. Will continue to re-assess medication response and side effects frequently to establish efficacy and ensure safety. Risks, any black box warnings, side effects, off label usage, and benefits of medication and treatment discussed with patient, along with potential adverse side effects of current and/or newly prescribed medication, alternative treatment options, and OTC medications.  Patient verbalized understanding of potential risks, any off label use of medication, any black box warnings, and any side effects in their own words. The patient verbalized understanding and agreed to comply with the safety plan discussed in their own words.  Patient given the number to the office. Number also available to the 24- hour suicide hotline.       TREATMENT PLAN/GOALS: Continue medications and treatment plan as indicated. Treatment and medication options discussed during today's visit. Patient  acknowledged and verbally consented to continue with current treatment plan and was educated on the importance of compliance with treatment and follow-up appointments.        -Discontinue Propanolol 10 mg twice daily as needed for anxiety/panic due to inefficacy   -Begin Clonidine 0.1 mg twice daily as needed for anxiety/panic.  Instructed the patient not to use Clonidine and Prazosin concurrently due to potential additive adverse effects when used together, specifically hypotension.  Instructed the patient to monitor for any signs or symptoms of hypotension and discontinue the Prazosin and Clonidine and notify this APRN side effects/hypotension occur.  The patient verbalizes understanding and endorses that she is agreeable to this.  -Begin BuSpar 5 mg twice daily then increase to 10 mg twice daily as adjunct for anxiety/depression  -Continue Lexapro 15 mg daily for depression/anxiety/PTSD  -Continue Prazosin 1 mg nightly for PTSD/insomnia  -Begin psychotherapy.  Referral placed on 8/3/22 to begin [facility] with [facility].         MEDICATION ISSUES: Discussed medication options and treatment plan of prescribed medication, any off label use of medication, as well as the risks, benefits, any black box warnings including increased suicidality, and side effects including but not limited to potential falls, dizziness, possible impaired driving, GI side effects (change in appetite, abdominal discomfort, nausea, vomiting, diarrhea, and/or constipation), dry mouth, somnolence, sedation, insomnia, activation, agitation, irritation, tremors, abnormal muscle movements or disorders, headache, sweating, possible bruising or rare bleeding, electrolyte and/or fluid abnormalities, change in blood pressure/heart rate/and or heart rhythm, sexual dysfunction, and metabolic adversities among others. Patient and/or guardian agreeable to call the office with any worsening of symptoms or onset of side effects,  or if any concerns or questions arise.  The contact information for the office is made available to the patient and/or guardian.  Patient and/or guardian agreeable to call 911 or go to the nearest ER should they begin having any SI/HI, or if any urgent concerns arise. No medication side effects or related complaints today.    This APRN has discussed with the patient/guardian about the possibility of serotonin syndrome when certain medications are taken together, as is the case with this patient.  This APRN has provided the patient/guardian with a list of symptoms of serotonin syndrome including symptoms of autonomic instability, altered sensorium, confusion, restlessness, agitation, myoclonus, hyperreflexia, hyperthermia, diaphoresis, tremor, chills, diarrhea and cramps, ataxia, headache, migraines, seizures, and insomnia; which could lead to permanent hyperthermic brain damage, cardiovascular collapse, coma, or even death.  The patient/guardian are instructed to stop medications immediately and either contact this APRN/this office during regular office hours, or go to the emergency department/call 911, if they begin to experience any of the symptoms discussed.  The benefits and risks of the current medication regimen are discussed with the patient/guardian, and they feel that the benefits out weigh the risks.  The patient/guardian verbalized understanding and agreement in their own words.                  [facility] No Show Policy:  We understand unexpected circumstances arise; however, anytime you miss your appointment we are unable to provide you appropriate care.  In addition, each appointment missed could have been used to provide care for others.  We ask that you call at least 24 hours in advance to cancel or reschedule an appointment.  We would like to take this opportunity to remind you of our policy stating patients who miss THREE or more appointments without cancelling or  rescheduling 24 hours in advance of the appointment may be subject to cancellation of any further visits with our clinic and recommendation to seek in-person services/visits.    Please call [phone] to reschedule your appointment. If there are reasons that make it difficult for you to keep the appointments, please call and let us know how we can help.  Please understand that medication prescribing will not continue without seeing your provider.      [facility]'s No Show Policy reviewed with patient at today's visit. Patient verbalized understanding of this policy. Discussed with patient that in the event that there are three or more no show visits, it will be recommended that they pursue in-person services/visits as noncompliance with telehealth visits indicates that patient is not an appropriate candidate for telemedicine and would likely be more appropriate for in-person services/visits. Patient verbalizes understanding and is agreeable to this.           MEDS ORDERED DURING VISIT:  New Medications Ordered This Visit   Medications   • busPIRone (BUSPAR) 10 MG tablet     Sig: Take 0.5 tablet by mouth Two (2) times Daily x1 week, then increase to 1 tablet by mouth Two (2) times Daily.     Dispense:  60 tablet     Refill:  0   • cloNIDine (CATAPRES) 0.1 MG tablet     Sig: Take 1 tablet by mouth 2 (Two) Times a Day As Needed (anxiety/panic).     Dispense:  60 tablet     Refill:  0       Return in about 2 weeks (around 8/29/2022), or if symptoms worsen or fail to improve, for Recheck.        Patient will follow-up in 2 weeks, highly encouraged the patient if she had any questions or concerns to contact the behavioral health virtual clinic for sooner appointment patient verbalized understanding.      Functional Status: Severe impairment    Prognosis: Guarded with Ongoing Treatment            This document has been electronically signed by EDGARDO Tamez  August 15, 2022 14:09  EDT      Some of the data in this electronic note has been brought forward from a previous encounter, any necessary changes have been made, it has been reviewed by this APRN, and it is accurate.       Part of this note may be an electronic transcription/translation of spoken language to printed text using the Dragon Dictation System.

## 2022-08-20 PROBLEM — Q60.0 CONGENITAL ABSENCE OF ONE KIDNEY: Status: ACTIVE | Noted: 2021-04-19

## 2022-08-22 ENCOUNTER — OFFICE VISIT (OUTPATIENT)
Dept: OBSTETRICS AND GYNECOLOGY | Facility: CLINIC | Age: 27
End: 2022-08-22

## 2022-08-22 VITALS
DIASTOLIC BLOOD PRESSURE: 88 MMHG | BODY MASS INDEX: 50.02 KG/M2 | SYSTOLIC BLOOD PRESSURE: 140 MMHG | HEIGHT: 64 IN | WEIGHT: 293 LBS

## 2022-08-22 DIAGNOSIS — Z11.3 SCREENING EXAMINATION FOR SEXUALLY TRANSMITTED DISEASE: ICD-10-CM

## 2022-08-22 DIAGNOSIS — Z01.419 WELL WOMAN EXAM WITH ROUTINE GYNECOLOGICAL EXAM: Primary | ICD-10-CM

## 2022-08-22 PROCEDURE — 99395 PREV VISIT EST AGE 18-39: CPT | Performed by: NURSE PRACTITIONER

## 2022-08-22 NOTE — PROGRESS NOTES
Subjective   Chief Complaint   Patient presents with   • Annual Exam     Well woman exam     Lilly Segovia is a 26 y.o. year old  presenting to be seen for her annual exam. She is unsure when she had her last pap, she thinks 2-3 years ago, she denies history of abnormal.    SEXUAL Hx:  She is currently sexually active.  In the past year there have been 2 new partners. Condoms are never used.  She would like to be screened for STD's at today's exam.  Current birth control method: not using any form of contraception and does not wish to get pregnant.  She is happy with her current method of contraception and does not want to discuss alternative methods of contraception. She states she cannot swallow pills and the patch broke her out. She is not interested in any hormonal contraception, she had mood changes with hormones in the past.   MENSTRUAL Hx:  Patient's last menstrual period was 2022 (exact date).  In the past 6 months her cycles have been regular, predictable and occur monthly.  Her menstrual flow is typically normal.   Each month on average there are roughly 0 day(s) of very heavy flow.    Intermenstrual bleeding is absent.    Post-coital bleeding is absent.  Dysmenorrhea: is not affecting her activities of daily living  PMS: is not affecting her activities of daily living  Her cycles are not a source of concern for her that she wishes to discuss today.  HEALTH Hx:  She exercises regularly: no (but is planning to start exercising more).  She wears her seat belt: yes.  She has concerns about domestic violence: no.  OTHER THINGS SHE WANTS TO DISCUSS TODAY:  Nothing else    The following portions of the patient's history were reviewed and updated as appropriate:problem list, current medications, allergies, past family history, past medical history, past social history and past surgical history.    Social History    Tobacco Use      Smoking status: Former Smoker        Packs/day: 0.50        Years: 5.00     "    Pack years: 2.5        Types: Cigarettes        Quit date: 2014        Years since quittin.6      Smokeless tobacco: Never Used    Review of Systems  Constitutional POS: nothing reported    NEG: anorexia or night sweats   Genitourinary POS: nothing reported    NEG: dysuria or hematuria      Gastointestinal POS: nothing reported    NEG: bloating, change in bowel habits, melena or reflux symptoms   Integument POS: nothing reported    NEG: moles that are changing in size, shape, color or rashes   Breast POS: nothing reported    NEG: persistent breast lump, skin dimpling or nipple discharge        Objective   Ht 162.6 cm (64\")   LMP 2022 (Exact Date)   Breastfeeding No   BMI 52.08 kg/m²     General:  well developed; well nourished  no acute distress  obese - Body mass index is 52.87 kg/m².   Skin:  No suspicious lesions seen   Thyroid: normal to inspection and palpation   Breasts:  Examined in supine position  Symmetric without masses or skin dimpling  Nipples normal without inversion, lesions or discharge  There are no palpable axillary nodes   Abdomen: soft, non-tender; no masses  no umbilical or inguinal hernias are present  no hepato-splenomegaly   Pelvis: Clinical staff was present for exam  :  urethral meatus normal;  Vaginal:  normal pink mucosa without prolapse or lesions.  Cervix:  normal appearance.  Uterus:  normal size, shape and consistency.  Adnexa:  normal bimanual exam of the adnexa.  Rectal:  digital rectal exam not performed; anus visually normal appearing.  Exam limited by patient body habitus        Assessment   1. Well woman gynecological exam     Plan     1. Pap and STD testing was done today.  If she does not receive the results of the Pap within 2 weeks  time, she was instructed to call to find out the results.  I explained to Lilly that the recommendations for Pap smear interval in a low risk patient's has lengthened to 3 years time.  I encouraged her to be seen yearly for a " full physical exam including breast and pelvic exam even during the off years when PAP's will not be performed.  2. Discussed contraceptive options, she is considering paragard and will call for placement with menses. Encouraged use of condoms with every episode of sic to decrease risk of sti transmission  3. The importance of keeping all planned follow-up and taking all medications as prescribed was emphasized.  4. Today I discussed with Lilly the total recommended calcium intake for a premenopausal female is 1000 mg.  Ideally this should be from dietary sources.  I reviewed calcium content in various foods including milk, fortified orange juice and yogurt.  If she cannot get sufficient calcium through dietary means, it is recommended to supplement with either a multivitamin or calcium to reach her daily goal.  I also reviewed the difference in the bioavailability of calcium carbonate and calcium citrate containing supplements and the importance of taking calcium carbonate containing products with food.  Finally, vitamin D's role in calcium absorption was reviewed and a total daily vitamin D intake of 800 units was recommended.  5. Follow up for annual exam 1 year  6. Or with menses for paragard insertion if she desires placement.   No orders of the defined types were placed in this encounter.         This note was electronically signed.    Mabel Osorio, EDGARDO  August 22, 2022

## 2022-08-24 ENCOUNTER — TELEPHONE (OUTPATIENT)
Dept: INTERNAL MEDICINE | Facility: CLINIC | Age: 27
End: 2022-08-24

## 2022-08-24 DIAGNOSIS — K92.1 BLOODY STOOL: Primary | ICD-10-CM

## 2022-08-24 LAB — REF LAB TEST METHOD: NORMAL

## 2022-08-24 NOTE — TELEPHONE ENCOUNTER
Caller: Lilly Segovia    Relationship: Self    Best call back number: [phone]     What is the best time to reach you: ANY TIME, ASAP    Who are you requesting to speak with (clinical staff, provider,  specific staff member): ANDERS LOPEZ    Do you know the name of the person who called:     What was the call regarding: PATIENT CALLED TO INFORM ANDERS LOPEZ THAT HER SYMPTOMS ARE BACK, SHE IS HAVING RECTAL BLEEDING, STOMACH CRAMPS AND NAUSEA. PATIENT WOULD LIKE TO SPEAK TO JOHN IF POSSIBLE. PLEASE ADVISE     Do you require a callback: YES

## 2022-08-24 NOTE — TELEPHONE ENCOUNTER
Pt states she was told that with the stool studies she would have to have diarrhea in order to be tested accurately, states she has not had diarrhea. Also states it's the same amount of blood that she showed a picture when she was in office. States she just wants to make sure it wouldn't be a wasted visit to the ER if not needed. Wants to know should she now keep the appt with us on Friday since she hasn't had stool test done?

## 2022-08-24 NOTE — TELEPHONE ENCOUNTER
I do not see that she completed the ordered stool studies. Please have her do that. Also if significant amt of blood, send to ER.

## 2022-08-24 NOTE — TELEPHONE ENCOUNTER
Pt notified that once the referral is made they will contact her within 4-5 business days to get that scheduled. In the mean time was advised to go to the ER if bleeding gets worse. Pt verbalized understanding

## 2022-08-25 ENCOUNTER — LAB (OUTPATIENT)
Dept: LAB | Facility: HOSPITAL | Age: 27
End: 2022-08-25

## 2022-08-25 DIAGNOSIS — R19.7 DIARRHEA OF PRESUMED INFECTIOUS ORIGIN: ICD-10-CM

## 2022-08-25 LAB
HEMOCCULT STL QL: NEGATIVE
WBC STL QL MICRO: NORMAL

## 2022-08-25 PROCEDURE — 87209 SMEAR COMPLEX STAIN: CPT | Performed by: NURSE PRACTITIONER

## 2022-08-25 PROCEDURE — 87045 FECES CULTURE AEROBIC BACT: CPT | Performed by: NURSE PRACTITIONER

## 2022-08-25 PROCEDURE — 87177 OVA AND PARASITES SMEARS: CPT | Performed by: NURSE PRACTITIONER

## 2022-08-25 PROCEDURE — 82270 OCCULT BLOOD FECES: CPT | Performed by: NURSE PRACTITIONER

## 2022-08-25 PROCEDURE — 87427 SHIGA-LIKE TOXIN AG IA: CPT | Performed by: NURSE PRACTITIONER

## 2022-08-25 PROCEDURE — 87205 SMEAR GRAM STAIN: CPT | Performed by: NURSE PRACTITIONER

## 2022-08-25 PROCEDURE — 87046 STOOL CULTR AEROBIC BACT EA: CPT | Performed by: NURSE PRACTITIONER

## 2022-08-29 ENCOUNTER — TELEMEDICINE (OUTPATIENT)
Dept: PSYCHIATRY | Facility: CLINIC | Age: 27
End: 2022-08-29

## 2022-08-29 DIAGNOSIS — F41.1 GENERALIZED ANXIETY DISORDER: Primary | Chronic | ICD-10-CM

## 2022-08-29 DIAGNOSIS — F41.0 PANIC DISORDER (EPISODIC PAROXYSMAL ANXIETY): Chronic | ICD-10-CM

## 2022-08-29 DIAGNOSIS — F43.10 POST TRAUMATIC STRESS DISORDER (PTSD): Chronic | ICD-10-CM

## 2022-08-29 DIAGNOSIS — F51.04 INSOMNIA, PSYCHOPHYSIOLOGICAL: Chronic | ICD-10-CM

## 2022-08-29 DIAGNOSIS — F33.0 MILD EPISODE OF RECURRENT MAJOR DEPRESSIVE DISORDER: Chronic | ICD-10-CM

## 2022-08-29 PROCEDURE — 90833 PSYTX W PT W E/M 30 MIN: CPT | Performed by: NURSE PRACTITIONER

## 2022-08-29 PROCEDURE — 99214 OFFICE O/P EST MOD 30 MIN: CPT | Performed by: NURSE PRACTITIONER

## 2022-08-29 RX ORDER — PRAZOSIN HYDROCHLORIDE 2 MG/1
2 CAPSULE ORAL NIGHTLY
Qty: 30 CAPSULE | Refills: 0 | Status: SHIPPED | OUTPATIENT
Start: 2022-08-29 | End: 2022-10-12 | Stop reason: SDUPTHER

## 2022-08-29 RX ORDER — QUETIAPINE FUMARATE 25 MG/1
12.5-25 TABLET, FILM COATED ORAL 2 TIMES DAILY PRN
Qty: 60 TABLET | Refills: 0 | Status: SHIPPED | OUTPATIENT
Start: 2022-08-29 | End: 2022-10-12 | Stop reason: SDUPTHER

## 2022-08-29 RX ORDER — ESCITALOPRAM OXALATE 20 MG/1
20 TABLET ORAL DAILY
Qty: 30 TABLET | Refills: 0 | Status: SHIPPED | OUTPATIENT
Start: 2022-08-29 | End: 2022-10-12 | Stop reason: SDUPTHER

## 2022-08-29 NOTE — PROGRESS NOTES
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 thoughts of harming yourself or others, please go to your nearest local emergency room or call 911. Will continue to re-assess medication response and side effects frequently to establish efficacy and ensure safety. Risks, any black box warnings, side effects, off label usage, and benefits of medication and treatment discussed with patient, along with potential adverse side effects of current and/or newly prescribed medication, alternative treatment options, and OTC medications.  Patient verbalized understanding of potential risks, any off label use of medication, any black box warnings, and any side effects in their own words. The patient verbalized understanding and agreed to comply with the safety plan discussed in their own words.  Patient given the number to the office. Number also available to the 24- hour suicide hotline.       TREATMENT PLAN/GOALS: Continue medications and treatment plan as indicated. Treatment and medication options discussed during today's visit. Patient acknowledged and verbally consented to continue with current treatment plan and was educated on the importance of compliance with treatment and follow-up appointments.        -Discontinue Clonidine 0.1  mg twice daily as needed for anxiety/panic due to partial efficacy   -Begin Seroquel 12.5-25 mg twice daily as needed for anxiety/panic   -Increase Lexapro to 20 mg daily for depression/anxiety/PTSD  -Increase Prazosin to 2 mg nightly for PTSD/insomnia  -Begin psychotherapy.  First appointment scheduled for 9/2/22 with Da Wood LCSW.        MEDICATION ISSUES: Discussed medication options and treatment plan of prescribed medication, any off label use of medication, as well as the risks, benefits, any black box warnings including increased suicidality, and side effects including but not limited to potential falls, dizziness, possible impaired driving, GI side effects (change in appetite, abdominal discomfort, nausea, vomiting, diarrhea, and/or constipation), dry mouth, somnolence, sedation, insomnia, activation, agitation, irritation, tremors, abnormal muscle movements or disorders, tardive dyskinesia, akathisia, asthenia, headache, sweating, possible bruising or rare bleeding, electrolyte and/or fluid abnormalities, change in blood pressure/heart rate/and or heart rhythm, hypotension, sexual dysfunction, rare impulse control problems, rare seizures, rare neuroleptic malignant syndrome, increased risk of death and cerebrovascular events, change in blood glucose and increased risk for diabetes, change in triglycerides and cholesterol and increased risk for dyslipidemia,  weight gain, weight gain that can become problematic to health, skin conditions and reactions, and metabolic adversities among others. Patient and/or guardian are agreeable to call the office with any worsening of symptoms or onset of side effects, or if any concerns or questions arise.  The contact information for the office is made available to the patient and/or guardian. Patient and/or guardian are agreeable to call 911 or go to the nearest ER should they begin having any SI/HI, or if any urgent concerns arise. No medication side effects  or related complaints today.    This APRN has discussed with the patient/guardian about the possibility of serotonin syndrome when certain medications are taken together, as is the case with this patient.  This APRN has provided the patient/guardian with a list of symptoms of serotonin syndrome including symptoms of autonomic instability, altered sensorium, confusion, restlessness, agitation, myoclonus, hyperreflexia, hyperthermia, diaphoresis, tremor, chills, diarrhea and cramps, ataxia, headache, migraines, seizures, and insomnia; which could lead to permanent hyperthermic brain damage, cardiovascular collapse, coma, or even death.  The patient/guardian are instructed to stop medications immediately and either contact this APRN/this office during regular office hours, or go to the emergency department/call 911, if they begin to experience any of the symptoms discussed.  The benefits and risks of the current medication regimen are discussed with the patient/guardian, and they feel that the benefits out weigh the risks.  The patient/guardian verbalized understanding and agreement in their own words.                  [facility] No Show Policy:  We understand unexpected circumstances arise; however, anytime you miss your appointment we are unable to provide you appropriate care.  In addition, each appointment missed could have been used to provide care for others.  We ask that you call at least 24 hours in advance to cancel or reschedule an appointment.  We would like to take this opportunity to remind you of our policy stating patients who miss THREE or more appointments without cancelling or rescheduling 24 hours in advance of the appointment may be subject to cancellation of any further visits with our clinic and recommendation to seek in-person services/visits.    Please call [phone] to reschedule your appointment. If there are reasons that make it difficult for you to keep the  appointments, please call and let us know how we can help.  Please understand that medication prescribing will not continue without seeing your provider.      [facility]'s No Show Policy reviewed with patient at today's visit. Patient verbalized understanding of this policy. Discussed with patient that in the event that there are three or more no show visits, it will be recommended that they pursue in-person services/visits as noncompliance with telehealth visits indicates that patient is not an appropriate candidate for telemedicine and would likely be more appropriate for in-person services/visits. Patient verbalizes understanding and is agreeable to this.           MEDS ORDERED DURING VISIT:  New Medications Ordered This Visit   Medications   • escitalopram (Lexapro) 20 MG tablet     Sig: Take 1 tablet by mouth Daily.     Dispense:  30 tablet     Refill:  0   • prazosin (MINIPRESS) 2 MG capsule     Sig: Take 1 capsule by mouth Every Night.     Dispense:  30 capsule     Refill:  0   • QUEtiapine (SEROquel) 25 MG tablet     Sig: Take 0.5-1 tablets by mouth 2 (Two) Times a Day As Needed (anxiety/panic).     Dispense:  60 tablet     Refill:  0       Return in about 2 weeks (around 9/12/2022), or if symptoms worsen or fail to improve, for Recheck.        Patient will follow-up in 2 weeks, highly encouraged the patient if she had any questions or concerns to contact the behavioral health virtual clinic for sooner appointment patient verbalized understanding.      Functional Status: Severe impairment    Prognosis: Guarded with Ongoing Treatment            This document has been electronically signed by EDGARDO Tamez  August 29, 2022 16:07 EDT      Some of the data in this electronic note has been brought forward from a previous encounter, any necessary changes have been made, it has been reviewed by this APRN, and it is accurate.       Part of this note may be an electronic  transcription/translation of spoken language to printed text using the Dragon Dictation System.

## 2022-08-30 LAB
BACTERIA SPEC CULT: NORMAL
BACTERIA SPEC CULT: NORMAL
CAMPYLOBACTER STL CULT: NORMAL
E COLI SXT STL QL IA: NEGATIVE
SALM + SHIG STL CULT: NORMAL

## 2022-08-31 ENCOUNTER — TELEPHONE (OUTPATIENT)
Dept: INTERNAL MEDICINE | Facility: CLINIC | Age: 27
End: 2022-08-31

## 2022-09-02 ENCOUNTER — TELEMEDICINE (OUTPATIENT)
Dept: PSYCHIATRY | Facility: CLINIC | Age: 27
End: 2022-09-02

## 2022-09-02 ENCOUNTER — TELEPHONE (OUTPATIENT)
Dept: INTERNAL MEDICINE | Facility: CLINIC | Age: 27
End: 2022-09-02

## 2022-09-02 DIAGNOSIS — F43.10 PTSD (POST-TRAUMATIC STRESS DISORDER): Primary | ICD-10-CM

## 2022-09-02 DIAGNOSIS — F41.0 PANIC ATTACKS: ICD-10-CM

## 2022-09-02 PROCEDURE — 90791 PSYCH DIAGNOSTIC EVALUATION: CPT | Performed by: SOCIAL WORKER
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
## 2022-09-02 NOTE — TELEPHONE ENCOUNTER
----- Message from Kaity Molina MA sent at 9/2/2022  8:51 AM EDT -----  Attempted to contact the patient at [phone], Mailbox was invalid. Unable to LVM.     HUB TO READ: Please provide the patient with the below provider comments.     The stool culture was normal  Written by EDGARDO Velázquez on 8/31/2022 12:03 PM EDT

## 2022-09-05 PROBLEM — F41.0 PANIC ATTACKS: Status: ACTIVE | Noted: 2022-09-05

## 2022-09-05 LAB
O+P SPEC MICRO: NORMAL
O+P STL TRI STN: NORMAL
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
## 2022-09-12 ENCOUNTER — TELEPHONE (OUTPATIENT)
Dept: INTERNAL MEDICINE | Facility: CLINIC | Age: 27
End: 2022-09-12

## 2022-09-12 NOTE — TELEPHONE ENCOUNTER
----- Message from Kaity Molina MA sent at 9/12/2022  9:11 AM EDT -----  LVM for the patient to return our call, office number was provided      HUB TO READ: Your stool studies is normal

## 2022-09-19 ENCOUNTER — TELEMEDICINE (OUTPATIENT)
Dept: PSYCHIATRY | Facility: CLINIC | Age: 27
End: 2022-09-19

## 2022-09-19 DIAGNOSIS — F43.10 PTSD (POST-TRAUMATIC STRESS DISORDER): Primary | ICD-10-CM

## 2022-09-19 DIAGNOSIS — F41.0 PANIC ATTACKS: ICD-10-CM

## 2022-09-19 PROCEDURE — 90837 PSYTX W PT 60 MINUTES: CPT | Performed by: SOCIAL WORKER
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
## 2022-10-12 ENCOUNTER — TELEMEDICINE (OUTPATIENT)
Dept: PSYCHIATRY | Facility: CLINIC | Age: 27
End: 2022-10-12

## 2022-10-12 DIAGNOSIS — F41.1 GENERALIZED ANXIETY DISORDER: Primary | Chronic | ICD-10-CM

## 2022-10-12 DIAGNOSIS — F41.0 PANIC DISORDER (EPISODIC PAROXYSMAL ANXIETY): Chronic | ICD-10-CM

## 2022-10-12 DIAGNOSIS — F51.04 INSOMNIA, PSYCHOPHYSIOLOGICAL: Chronic | ICD-10-CM

## 2022-10-12 DIAGNOSIS — F43.10 POST TRAUMATIC STRESS DISORDER (PTSD): Chronic | ICD-10-CM

## 2022-10-12 DIAGNOSIS — F33.0 MILD EPISODE OF RECURRENT MAJOR DEPRESSIVE DISORDER: Chronic | ICD-10-CM

## 2022-10-12 PROCEDURE — 90833 PSYTX W PT W E/M 30 MIN: CPT | Performed by: NURSE PRACTITIONER

## 2022-10-12 PROCEDURE — 99214 OFFICE O/P EST MOD 30 MIN: CPT | Performed by: NURSE PRACTITIONER

## 2022-10-12 RX ORDER — ESCITALOPRAM OXALATE 10 MG/1
15 TABLET ORAL DAILY
Qty: 45 TABLET | Refills: 0 | Status: SHIPPED | OUTPATIENT
Start: 2022-10-12 | End: 2022-11-07 | Stop reason: SDUPTHER

## 2022-10-12 RX ORDER — QUETIAPINE FUMARATE 25 MG/1
12.5-25 TABLET, FILM COATED ORAL 2 TIMES DAILY PRN
Qty: 60 TABLET | Refills: 0 | Status: SHIPPED | OUTPATIENT
Start: 2022-10-12 | End: 2022-11-07 | Stop reason: SDUPTHER

## 2022-10-12 RX ORDER — PRAZOSIN HYDROCHLORIDE 2 MG/1
2 CAPSULE ORAL NIGHTLY
Qty: 30 CAPSULE | Refills: 0 | Status: SHIPPED | OUTPATIENT
Start: 2022-10-12 | End: 2022-11-07 | Stop reason: SDUPTHER

## 2022-10-12 NOTE — PROGRESS NOTES
"This provider is located at the [facility] (through [facility]), [address], [place], [address] using a secure [employer] Video Visit through [employer]. Patient is being seen remotely via telehealth at their home address in [place], and stated they are in a secure environment for this session. The patient's condition being diagnosed/treated is appropriate for telemedicine. The provider identified herself as well as her credentials. The patient, and/or patients guardian, consent to be seen remotely, and when consent is given they understand that the consent allows for patient identifiable information to be sent to a third party as needed. They may refuse to be seen remotely at any time. The electronic data is encrypted and password protected, and the patient and/or guardian has been advised of the potential risks to privacy not withstanding such measures.    You have chosen to receive care through a telehealth visit.  Do you consent to use a video/audio connection for your medical care today? Yes     Patient confirmed consent for today's encounter on 10/12/22.          Subjective   Lilly Segovia is a 26 y.o. female who is here today for medication management follow up.    Chief Complaint: PTSD, anxiety, panic attacks, depression, insomnia     History of Present Illness:  The patient describes her mood as \"okay\" over the last few weeks.  The patient endorses that overall she has been doing okay over the past few weeks, but states that she has had a lot of life stressors going on with work and home, which she endorses has contributed to her anxiety recently.  The patient states that she has been really stressed out with dealing with life stressors, specifically the stuff going on at home.  The patient states that last week she kicked her ex out and endorses that this was a significant stressor for her and exacerbated her PTSD.  She states that overall anxiety has been somewhat " improved recently, but states that recent life stressors have been causing worsening of the symptoms over the past couple of weeks.  She states that she has been using the Seroquel as needed for anxiety and endorses that it has been effective.  She states that she has been using 12.5 mg most times because the 25 mg causes her to feel a little tired/fatigued.  She states that she has not been using it as much recently because she is trying to focus on using coping mechanisms that she is currently learning in therapy, but states that there are some days when symptoms are worse and she needs to take it.  She states that she was not able to tolerate the 20 mg of Lexapro.  She states that she felt constantly fatigued and exhausted that with beginning this dose and endorses that this continued for a few weeks.  She states that she also began noticing sexual side effects (anorgasmia) which she endorses was really concerning for her, so states that decided to go back to the 15 mg of Lexapro.  She states that she has been taking the 15 mg daily for approximately 2 weeks now.  She states that the fatigue and anorgasmia resolved with going back to the 15 mg and she has tolerated this dose well.  She states that she has not been sleeping as well recently.  The patient endorses that she is having trouble both falling and staying asleep now, which she stated started last week after she kicked her ex out.  The patient states that she has a lot of difficulty being alone and she has not lived alone in several years now, so states that whenever she is trying to fall asleep she feels very anxious that someone is going to break in which causes her difficulty falling asleep.  She states that when she is able to go to sleep every Wednesday night wakes her up because she is a light sleeper anyways, so states that she has had difficulty staying asleep as well.  The patient endorses that she has not had any nightmares recently.  She states  that she still dreams every night, but states that she has not had any nightmares or bad dreams that she began taking the increased dose of Prazosin.  The patient denies any new medical problems or changes in medications since last appointment with this facility.  The patient reports compliance with current medication regimen.  The patient denies any abnormal muscle movements or tics.  The patient rates her depression at a 3/10 on a 0-10 scale, with 10 being the worst.  The patient reports that her mood/depression has been more well-controlled over the past week, but states that last week her depression was worse due to the life stressors that she was dealing with at the time.  The patient rates her anxiety at a 6-7/10 on a 0-10 scale, with 10 being the worst.  The patient rates her PTSD at a 10/10 on a 0-10 scale, with 10 being the worst.  The patient reports that recent life stressors have been triggering for her and exacerbated her PTSD symptoms.  The patient rates hopelessness at a 0/10 on a 0-10 scale with 10 being the worst.  The patient denies suicidal ideations and homicidal ideations, and is convincing.  The patient denies any auditory hallucinations or visual hallucinations.  The patient does not endorse significant symptoms consistent with bipolar disorder or a psychotic illness.            LMP:  2-3 weeks ago.  The patient denies any chance of pregnancy at this time.  The patient was educated that her prescribed medications can have potential risk to a developing fetus. The patient is advised to contact this APRN/this office if she becomes pregnant or plans to become pregnant.  Pt verbalizes understanding and acknowledged agreement with this plan in her own words.        Prior Psychiatric Medications:  Clonidine 0.1 mg twice daily as needed for anxiety/panic - mildly effective for anxiety/panic approximately 20 minutes after taking it, but then anxiety/panic returned   Buspar 5 mg twice daily - caused  nausea and headaches  Propanolol 10-20 mg twice daily as needed for anxiety/panic - ineffective   Hydroxyzine 100 mg three times daily as needed for anxiety/panic attacks - mild partial efficacy  Wellbutrin - increased anxiety and panic   Vyvanse - reports she was on this medication for treatment of ADHD from approximately age [age].  Reports that it did seem to be effective for focus/concentration.  The patient endorses that she has taken several psychotropic medications throughout childhood.  Reports that she cannot recall the names of all of these medications, but states that she can recall she was prescribed Lithium and Ativan at one point.        The following portions of the patient's history were reviewed and updated as appropriate: allergies, current medications, past family history, past medical history, past social history, past surgical history and problem list.    Review of Systems   Constitutional: Negative for activity change, appetite change, fatigue and unexpected weight change.   Psychiatric/Behavioral: Positive for decreased concentration, dysphoric mood and sleep disturbance. Negative for agitation, behavioral problems, confusion, hallucinations, self-injury and suicidal ideas. The patient is nervous/anxious. The patient is not hyperactive.        Objective   Physical Exam  Constitutional:       Appearance: Normal appearance.   Neurological:      Mental Status: She is alert.   Psychiatric:         Attention and Perception: Attention and perception normal.         Mood and Affect: Affect normal. Mood is anxious and depressed.         Speech: Speech normal.         Behavior: Behavior normal. Behavior is cooperative.         Thought Content: Thought content normal. Thought content does not include homicidal or suicidal ideation. Thought content does not include homicidal or suicidal plan.         Cognition and Memory: Cognition and memory normal.         Judgment: Judgment normal.       not currently  breastfeeding.    The patient was seen remotely today via a MyChart Video Visit through [facility].  Unable to obtain vital signs due to nature of remote visit.  Height stated at 64 inches.  Weight stated at 308 pounds.     Allergies   Allergen Reactions   • Cetirizine Anaphylaxis and Unknown - High Severity       Recent Results (from the past 2016 hour(s))   LIQUID-BASED PAP SMEAR, [facility] ([facility])    Collection Time: 22  2:55 PM    Specimen: ThinPrep Vial   Result Value Ref Range    Reference Lab Report       [facility]  [address] [place] [address]  Phone: [phone] or [phone]  Fax: [phone]  Juan Luis Hernández M.D., Medical Director    PATIENT NAME                                     LABORATORY NO.  SOPHIA BANKS                                        [id number]  [id number]                                 AGE                    SEX   SSN              CLIENT REF #  [facility]                       26        1995      F     [id number]      [id number]    EDGARDO GUTIERREZ                       REQUESTING MHOSSEIN           ATTENDING M.D.         COPY TO.  [address] [facility] [address]            [place] [address]                        DATE COLLECTED            DATE RECEIVED          DATE REPORTED  2022    ThinPrep Pap with [facility]    DIAGNOSIS:  Negative for intraepithelial lesion or malignancy    Multiple factors can influence accuracy of Pap  tests; therefore, screening at  regular intervals is necessary for early cancer detection.      SPECIMEN ADEQUACY:  SATISFACTORY FOR EVALUATION  Transformation zone is present.  SOURCE OF SPECIMEN:       CERVICAL  SLIDES:  1  CLINICAL HISTORY:  Well woman exam with routine gynecological exam    Chlamydia / Gonorrhea  CHLAMYDIA TRACHOMATIS: Negative  NEISSERIA GONORRHOEAE: Negative    The Aptima Combo 2 assay is a target  amplification nucleic acid probe test that  utilizes target capture for the in vitro qualitative detection and differentiation of  ribosomal RNA from Chlamydia trachomatis and Neisseria gonorrhoeae to aid  in the diagnosis of chlamdial and gonococcal disease using the Fiberspar system.    CYTOTECHNOLOGIST:             TREVOR DAVENPORT ([facility])    CPT CODES:  57595, 20657, 21373     Fecal Leukocytes - Stool, Per Rectum    Collection Time: 08/25/22  1:59 PM    Specimen: Per Rectum; Stool   Result Value Ref Range    Fecal Leukocytes No WBC's Seen No WBC's Seen   Stool Culture (Reference Lab) - Stool, Per Rectum    Collection Time: 08/25/22  1:59 PM    Specimen: Per Rectum; Stool   Result Value Ref Range    Salmonella/Shigella Screen Final report     Result 1 Comment     Campylobacter Culture Final report     Result 1 Comment     E coli, Shiga toxin Assay Negative Negative   Ova & Parasite Examination - Stool, Per Rectum    Collection Time: 08/25/22  1:59 PM    Specimen: Per Rectum; Stool   Result Value Ref Range    Ova + Parasite Exam No ova or parasites seen on concentrated smear No Ova or Parasites Seen    Trichrome Stain No ova or parasites seen on trichrome stain    Occult Blood X 1, Stool - Stool, Per Rectum    Collection Time: 08/25/22  2:09 PM    Specimen: Per Rectum; Stool   Result Value Ref Range    Fecal Occult Blood Negative Negative       Current Medications:   Current Outpatient Medications   Medication Sig Dispense Refill   • albuterol sulfate  (90 Base) MCG/ACT inhaler Inhale 2 puffs Every 4 (Four) Hours As Needed for Wheezing or Shortness of Air. 18 g 2   • escitalopram (Lexapro) 10 MG tablet Take 1.5 tablets by mouth Daily. 45 tablet 0   • fluticasone (Flonase) 50 MCG/ACT nasal spray 2 sprays into the nostril(s) as directed by provider Daily. 16 g 11   • levothyroxine (SYNTHROID, LEVOTHROID) 100 MCG tablet Take 1 tablet by mouth Daily. 30 tablet 3   • omeprazole (priLOSEC) 40 MG capsule Take 1 capsule by  mouth Daily. 90 capsule 1   • prazosin (MINIPRESS) 2 MG capsule Take 1 capsule by mouth Every Night. 30 capsule 0   • QUEtiapine (SEROquel) 25 MG tablet Take 0.5-1 tablets by mouth 2 (Two) Times a Day As Needed (anxiety/panic). 60 tablet 0     No current facility-administered medications for this visit.       Mental Status Exam:   Hygiene:   good  Cooperation:  Cooperative  Eye Contact:  Good  Psychomotor Behavior:  Appropriate  Affect:  Mood congruent  Hopelessness: Denies  Speech:  Normal  Thought Process:  Goal directed  Thought Content:  Normal  Suicidal:  None  Homicidal:  None  Hallucinations:  None  Delusion:  None  Memory:  Intact  Orientation:  Person, Place, Time and Situation  Reliability:  good  Insight:  Fair  Judgement:  Fair  Impulse Control:  Good  Physical/Medical Issues:  Yes See medical history              Assessment & Plan   Diagnoses and all orders for this visit:    1. Generalized anxiety disorder (Primary)  -     escitalopram (Lexapro) 10 MG tablet; Take 1.5 tablets by mouth Daily.  Dispense: 45 tablet; Refill: 0  -     QUEtiapine (SEROquel) 25 MG tablet; Take 0.5-1 tablets by mouth 2 (Two) Times a Day As Needed (anxiety/panic).  Dispense: 60 tablet; Refill: 0  -     [facility] - Swab,; Future    2. Panic disorder (episodic paroxysmal anxiety)  -     escitalopram (Lexapro) 10 MG tablet; Take 1.5 tablets by mouth Daily.  Dispense: 45 tablet; Refill: 0  -     QUEtiapine (SEROquel) 25 MG tablet; Take 0.5-1 tablets by mouth 2 (Two) Times a Day As Needed (anxiety/panic).  Dispense: 60 tablet; Refill: 0  -     [facility] - Swab,; Future    3. Post traumatic stress disorder (PTSD)  -     escitalopram (Lexapro) 10 MG tablet; Take 1.5 tablets by mouth Daily.  Dispense: 45 tablet; Refill: 0  -     prazosin (MINIPRESS) 2 MG capsule; Take 1 capsule by mouth Every Night.  Dispense: 30 capsule; Refill: 0  -     [facility] - Swab,; Future    4. Mild episode of recurrent major depressive disorder (HCC)  -      escitalopram (Lexapro) 10 MG tablet; Take 1.5 tablets by mouth Daily.  Dispense: 45 tablet; Refill: 0  -     [facility]Sight - Swab,; Future    5. Insomnia, psychophysiological  -     prazosin (MINIPRESS) 2 MG capsule; Take 1 capsule by mouth Every Night.  Dispense: 30 capsule; Refill: 0  -     QUEtiapine (SEROquel) 25 MG tablet; Take 0.5-1 tablets by mouth 2 (Two) Times a Day As Needed (anxiety/panic).  Dispense: 60 tablet; Refill: 0  -     GeneSight - Swab,; Future            In/Start Time: 3:49 PM EST.  Out/Stop Time: 4:05 PM EST.  16 minutes of face to face direct patient care with patient was spent for supportive psychotherapy including promoting the therapeutic alliance, strengthening self awareness and insights, strengthening coping skills, discussing relaxation techniques, counseling the patient regarding diagnoses and utilizing cognitive behavioral therapy to assist the patient in recognizing more appropriate coping mechanisms which are proven effective in reducing the severity of frequency of symptoms.  This APRN assisted the patient in processing the patient's diagnoses including depression, anxiety, and PTSD, and also acknowledged and normalized the patient's thoughts, feelings, and concerns  The patient is also strongly urged to eat healthy well balanced foods in order to reduce further health risks, and exercise as tolerated and in guidance with the patient's PCP's recommendations. This APRN allowed the patient to freely discuss issues without interruption or judgment.  This APRN answered any questions the patient had regarding medication and treatment plan.            Visit Diagnoses:    ICD-10-CM ICD-9-CM   1. Generalized anxiety disorder  F41.1 300.02   2. Panic disorder (episodic paroxysmal anxiety)  F41.0 300.01   3. Post traumatic stress disorder (PTSD)  F43.10 309.81   4. Mild episode of recurrent major depressive disorder (HCC)  F33.0 296.31   5. Insomnia, psychophysiological  F51.04 307.42        GOALS:  Short Term Goals: Patient will be compliant with medication, and patient will have no significant medication related side effects.  Patient will be engaged in psychotherapy as indicated.  Patient will report subjective improvement of symptoms.  Long term goals: To stabilize mood and treat/improve subjective symptoms, the patient will stay out of the hospital, the patient will be at an optimal level of functioning, and the patient will take all medications as prescribed.  The patient verbalized understanding and agreement with goals that were mutually set.      SUICIDE RISK ASSESSMENT: Unalterable demographics and a history of mental health intervention indicate this patient is in a high risk category compared to the general population. At present, the patient denies active SI/HI, intentions, or plans at this time and agrees to seek immediate care should such thoughts develop. The patient verbalizes understanding of how to access emergency care if needed and agrees to do so. Consideration of suicide risk and protective factors such as history, current presentation, individual strengths and weaknesses, psychosocial and environmental stressors and variables, psychiatric illness and symptoms, medical conditions and pain, took place in this interview. Based on those considerations, the patient is determined: within individual baseline and presenting no imminent risk for suicide or homicide. Other recommendations: The patient does not meet the criteria for inpatient admission and is not a safety risk to self or others at today's visit. Inpatient treatment offers no significant advantages over outpatient treatment for this patient at today's visit.      SAFETY PLAN:  Patient was given ample time for questions and fully participated in treatment planning.  Patient was encouraged to call the clinic with any questions or concerns.  Patient was informed of access to emergency care. If patient were to develop any  significant symptomatology, suicidal ideation, homicidal ideation, any concerns, or feel unsafe at any time they are to call the clinic and if unable to get immediate assistance should immediately call 911 or go to the nearest emergency room.  The patient is advised to remove or secure (lock away) all lethal weapons (including guns) and sharps (including razors, scissors, knives, etc.).  All medications (including any prescribed and any over the counter medications) should be stored in a safe and secured location that is not obtainable by children/adolescents.  Patient was given an opportunity and encouraged to ask questions about their medication, illness, and treatment. Patient contracted verbally for the following: If you are experiencing an emotional crisis or have thoughts of harming yourself or others, please go to your nearest local emergency room or call 911. Will continue to re-assess medication response and side effects frequently to establish efficacy and ensure safety. Risks, any black box warnings, side effects, off label usage, and benefits of medication and treatment discussed with patient, along with potential adverse side effects of current and/or newly prescribed medication, alternative treatment options, and OTC medications.  Patient verbalized understanding of potential risks, any off label use of medication, any black box warnings, and any side effects in their own words. The patient verbalized understanding and agreed to comply with the safety plan discussed in their own words.  Patient given the number to the office. Number also available to the 24- hour suicide hotline.       TREATMENT PLAN/GOALS: Continue medications and treatment plan as indicated. Treatment and medication options discussed during today's visit. Patient acknowledged and verbally consented to continue with current treatment plan and was educated on the importance of compliance with treatment and follow-up  appointments.        -Continue Lexapro 15 mg daily for depression/anxiety/PTSD.  Reports that she decreased Lexapro back to 15 mg daily approximately 2 weeks ago due to side effects.  Discussed with the patient that we will continue at 15 mg daily as side effects have resolved with returning to this dose.  Discussed with the patient that we will allow more time for anxiety to improve as life stressors resolve and she continues in therapy, but if anxiety is not well-controlled once life stressors resolve we will discuss potentially switching her Lexapro to another antidepressant that may be more effective for managing her anxiety.  The patient verbalizes understanding and endorses that she is agreeable to this.   -Continue Seroquel 12.5-25 mg twice daily as needed for anxiety/panic.  Instructed the patient to begin taking 25 mg nightly for anxiety/insomnia as she had had difficulty with these symptoms over the past week as she is getting acclimated to living alone, and to continue using 12.5-25 mg twice daily as needed for anxiety/panic.  Discussed with the patient that once she feels acclimated to living alone and does not feel as anxious at nighttime we can trial discontinuing Seroquel 25 mg nightly.  The patient verbalizes understanding and endorses that she is agreeable to this.   -Continue Prazosin 2 mg nightly for PTSD/insomnia  -Continue psychotherapy with aD Wood LCSW  -[employer] test ordered at this time due to multiple trials and failures with several psychotropic medications in varying medication classes.  Discussed with patient that she will receive further notification from [employer] regarding testing and that a testing kit will be mailed to her for completion and return.  The patient verbalizes understanding and endorses that she is agreeable to this.        MEDICATION ISSUES: Discussed medication options and treatment plan of prescribed medication, any off label use of medication, as well as  the risks, benefits, any black box warnings including increased suicidality, and side effects including but not limited to potential falls, dizziness, possible impaired driving, GI side effects (change in appetite, abdominal discomfort, nausea, vomiting, diarrhea, and/or constipation), dry mouth, somnolence, sedation, insomnia, activation, agitation, irritation, tremors, abnormal muscle movements or disorders, tardive dyskinesia, akathisia, asthenia, headache, sweating, possible bruising or rare bleeding, electrolyte and/or fluid abnormalities, change in blood pressure/heart rate/and or heart rhythm, hypotension, sexual dysfunction, rare impulse control problems, rare seizures, rare neuroleptic malignant syndrome, increased risk of death and cerebrovascular events, change in blood glucose and increased risk for diabetes, change in triglycerides and cholesterol and increased risk for dyslipidemia,  weight gain, weight gain that can become problematic to health, skin conditions and reactions, and metabolic adversities among others. Patient and/or guardian are agreeable to call the office with any worsening of symptoms or onset of side effects, or if any concerns or questions arise.  The contact information for the office is made available to the patient and/or guardian. Patient and/or guardian are agreeable to call 911 or go to the nearest ER should they begin having any SI/HI, or if any urgent concerns arise. No medication side effects or related complaints today.    This APRN has discussed with the patient/guardian about the possibility of serotonin syndrome when certain medications are taken together, as is the case with this patient.  This APRN has provided the patient/guardian with a list of symptoms of serotonin syndrome including symptoms of autonomic instability, altered sensorium, confusion, restlessness, agitation, myoclonus, hyperreflexia, hyperthermia, diaphoresis, tremor, chills, diarrhea and cramps,  ataxia, headache, migraines, seizures, and insomnia; which could lead to permanent hyperthermic brain damage, cardiovascular collapse, coma, or even death.  The patient/guardian are instructed to stop medications immediately and either contact this APRN/this office during regular office hours, or go to the emergency department/call 911, if they begin to experience any of the symptoms discussed.  The benefits and risks of the current medication regimen are discussed with the patient/guardian, and they feel that the benefits out weigh the risks.  The patient/guardian verbalized understanding and agreement in their own words.                  [facility] No Show Policy:  We understand unexpected circumstances arise; however, anytime you miss your appointment we are unable to provide you appropriate care.  In addition, each appointment missed could have been used to provide care for others.  We ask that you call at least 24 hours in advance to cancel or reschedule an appointment.  We would like to take this opportunity to remind you of our policy stating patients who miss THREE or more appointments without cancelling or rescheduling 24 hours in advance of the appointment may be subject to cancellation of any further visits with our clinic and recommendation to seek in-person services/visits.    Please call [phone] to reschedule your appointment. If there are reasons that make it difficult for you to keep the appointments, please call and let us know how we can help.  Please understand that medication prescribing will not continue without seeing your provider.      [facility]'s No Show Policy reviewed with patient at today's visit. Patient verbalized understanding of this policy. Discussed with patient that in the event that there are three or more no show visits, it will be recommended that they pursue in-person services/visits as noncompliance with telehealth visits  indicates that patient is not an appropriate candidate for telemedicine and would likely be more appropriate for in-person services/visits. Patient verbalizes understanding and is agreeable to this.           MEDS ORDERED DURING VISIT:  New Medications Ordered This Visit   Medications   • escitalopram (Lexapro) 10 MG tablet     Sig: Take 1.5 tablets by mouth Daily.     Dispense:  45 tablet     Refill:  0   • prazosin (MINIPRESS) 2 MG capsule     Sig: Take 1 capsule by mouth Every Night.     Dispense:  30 capsule     Refill:  0   • QUEtiapine (SEROquel) 25 MG tablet     Sig: Take 0.5-1 tablets by mouth 2 (Two) Times a Day As Needed (anxiety/panic).     Dispense:  60 tablet     Refill:  0       Return in about 4 weeks (around 11/9/2022), or if symptoms worsen or fail to improve, for Recheck.        Patient will follow-up in 4 weeks, highly encouraged the patient if she had any questions or concerns to contact the behavioral health virtual clinic for sooner appointment patient verbalized understanding.      Functional Status: Moderate impairment     Prognosis: Fair with Ongoing Treatment             This document has been electronically signed by EDGARDO Tamez  October 12, 2022 16:14 EDT      Some of the data in this electronic note has been brought forward from a previous encounter, any necessary changes have been made, it has been reviewed by this APRN, and it is accurate.       Part of this note may be an electronic transcription/translation of spoken language to printed text using the Dragon Dictation System.

## 2022-11-07 ENCOUNTER — TELEMEDICINE (OUTPATIENT)
Dept: PSYCHIATRY | Facility: CLINIC | Age: 27
End: 2022-11-07

## 2022-11-07 DIAGNOSIS — F51.04 INSOMNIA, PSYCHOPHYSIOLOGICAL: Chronic | ICD-10-CM

## 2022-11-07 DIAGNOSIS — F43.10 POST TRAUMATIC STRESS DISORDER (PTSD): Primary | Chronic | ICD-10-CM

## 2022-11-07 DIAGNOSIS — K21.9 GASTROESOPHAGEAL REFLUX DISEASE, UNSPECIFIED WHETHER ESOPHAGITIS PRESENT: Primary | ICD-10-CM

## 2022-11-07 DIAGNOSIS — F41.0 PANIC DISORDER (EPISODIC PAROXYSMAL ANXIETY): Chronic | ICD-10-CM

## 2022-11-07 DIAGNOSIS — F41.1 GENERALIZED ANXIETY DISORDER: Chronic | ICD-10-CM

## 2022-11-07 DIAGNOSIS — F33.1 MODERATE EPISODE OF RECURRENT MAJOR DEPRESSIVE DISORDER: Chronic | ICD-10-CM

## 2022-11-07 PROCEDURE — 90833 PSYTX W PT W E/M 30 MIN: CPT | Performed by: NURSE PRACTITIONER

## 2022-11-07 PROCEDURE — 99214 OFFICE O/P EST MOD 30 MIN: CPT | Performed by: NURSE PRACTITIONER

## 2022-11-07 RX ORDER — ESCITALOPRAM OXALATE 10 MG/1
15 TABLET ORAL DAILY
Qty: 45 TABLET | Refills: 0 | Status: SHIPPED | OUTPATIENT
Start: 2022-11-07

## 2022-11-07 RX ORDER — PRAZOSIN HYDROCHLORIDE 2 MG/1
2 CAPSULE ORAL NIGHTLY
Qty: 30 CAPSULE | Refills: 0 | Status: SHIPPED | OUTPATIENT
Start: 2022-11-07

## 2022-11-07 RX ORDER — OMEPRAZOLE 40 MG/1
40 CAPSULE, DELAYED RELEASE ORAL DAILY
Qty: 90 CAPSULE | Refills: 1 | Status: SHIPPED | OUTPATIENT
Start: 2022-11-07

## 2022-11-07 RX ORDER — LAMOTRIGINE 25 MG/1
TABLET ORAL
Qty: 45 TABLET | Refills: 0 | Status: SHIPPED | OUTPATIENT
Start: 2022-11-07

## 2022-11-07 RX ORDER — QUETIAPINE FUMARATE 25 MG/1
12.5-25 TABLET, FILM COATED ORAL 2 TIMES DAILY PRN
Qty: 60 TABLET | Refills: 0 | Status: SHIPPED | OUTPATIENT
Start: 2022-11-07

## 2022-11-07 NOTE — PROGRESS NOTES
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 autonomic instability, altered sensorium, confusion, restlessness, agitation, myoclonus, hyperreflexia, hyperthermia, diaphoresis, tremor, chills, diarrhea and cramps, ataxia, headache, migraines, seizures, and insomnia; which could lead to permanent hyperthermic brain damage, cardiovascular collapse, coma, or even death.  The patient/guardian are instructed to stop medications immediately and either contact this APRN/this office during regular office hours, or go to the emergency department/call 911, if they begin to experience any of the symptoms discussed.  The benefits and risks of the current medication regimen are discussed with the patient/guardian, and they feel that the benefits out weigh the risks.  The patient/guardian verbalized understanding and agreement in their own words.    This APRN has discussed the benefits and risks of taking Lamictal (Lamotrigine).  The side effects of Lamictal can include a benign rash, blurred or double vision, dizziness, ataxia, sedation, headache, tremor, insomnia, poor coordination, fatigue,  nausea, vomiting, dyspepsia, rhinitis, infection, pharyngitis, asthenia, a rare but serious rash, rare multi-organ failure  associated with Pa-Ton Syndrome, toxic epidermal necrolysis, drug hypersensitivity syndrome, rare blood dyscrasias, rare aseptic meningitis, rare sudden unexplained deaths in people with epilepsy, withdrawal seizures upon abrupt withdrawal, and rare activation of suicidal ideation and behavior (suicidality).  This APRN has discussed that a very slow dose titration when starting, or changing doses, of Lamictal may reduce the incidence of skin rash and other side effects.  The dosage should not be titrated upwards or increased faster than recommended due to the possibility of the discussed side effects and risk of development of a skin rash (which can become life threatening).    This APRN has also discussed that if the patient stops taking the Lamictal for 5 days or longer, it will be necessary to restart the drug with an initial dose titration, as rashes have been reported on reexposure.  If the patient/guardian and Provider decide to stop the Lamictal, the patient/guardian will follow the directions of this APRN/this office as a guided taper over about two weeks is appropriate due to the risk of relapse in bipolar disorder with those with bipolar disorder, the risk of seizures in those with epilepsy, and discontinuation symptoms upon rapid discontinuation of Lamictal.    The patient/guardian verbalizes understanding of benefits and risks as discussed, the patient/guardian feels the benefits outweigh the risks and is agreeable to continue/take Lamictal as discussed.  The patient/guardian is advised should any side effects or rash develops they are to stop the Lamictal immediately and contact this APRN/this office or go to the emergency department immediately.  The patient/guardian verbalizes understanding and agreement with treatment plan in their own words.                  [facility] No Show Policy:  We understand unexpected circumstances arise; however, anytime you miss your  appointment we are unable to provide you appropriate care.  In addition, each appointment missed could have been used to provide care for others.  We ask that you call at least 24 hours in advance to cancel or reschedule an appointment.  We would like to take this opportunity to remind you of our policy stating patients who miss THREE or more appointments without cancelling or rescheduling 24 hours in advance of the appointment may be subject to cancellation of any further visits with our clinic and recommendation to seek in-person services/visits.    Please call [phone] to reschedule your appointment. If there are reasons that make it difficult for you to keep the appointments, please call and let us know how we can help.  Please understand that medication prescribing will not continue without seeing your provider.      [facility]'s No Show Policy reviewed with patient at today's visit. Patient verbalized understanding of this policy. Discussed with patient that in the event that there are three or more no show visits, it will be recommended that they pursue in-person services/visits as noncompliance with telehealth visits indicates that patient is not an appropriate candidate for telemedicine and would likely be more appropriate for in-person services/visits. Patient verbalizes understanding and is agreeable to this.           MEDS ORDERED DURING VISIT:  New Medications Ordered This Visit   Medications   • QUEtiapine (SEROquel) 25 MG tablet     Sig: Take 0.5-1 tablets by mouth 2 (Two) Times a Day As Needed (anxiety/panic).     Dispense:  60 tablet     Refill:  0   • prazosin (MINIPRESS) 2 MG capsule     Sig: Take 1 capsule by mouth Every Night.     Dispense:  30 capsule     Refill:  0   • escitalopram (Lexapro) 10 MG tablet     Sig: Take 1.5 tablets by mouth Daily.     Dispense:  45 tablet     Refill:  0   • lamoTRIgine (LaMICtal) 25 MG tablet     Sig: Take 1 tablet by mouth Daily x2  weeks then increase to 2 tablets by mouth Daily.     Dispense:  45 tablet     Refill:  0       Return in about 4 weeks (around 12/5/2022), or if symptoms worsen or fail to improve, for Recheck.        Patient will follow-up in 4 weeks, highly encouraged the patient if she had any questions or concerns to contact the behavioral health virtual clinic for sooner appointment patient verbalized understanding.      Functional Status: Severe impairment    Prognosis: Guarded with Ongoing Treatment            This document has been electronically signed by EDGARDO Tamez  November 7, 2022 16:23 EST      Some of the data in this electronic note has been brought forward from a previous encounter, any necessary changes have been made, it has been reviewed by this APRN, and it is accurate.       Part of this note may be an electronic transcription/translation of spoken language to printed text using the Dragon Dictation System.

## 2022-11-29 ENCOUNTER — TELEMEDICINE (OUTPATIENT)
Dept: FAMILY MEDICINE CLINIC | Facility: TELEHEALTH | Age: 27
End: 2022-11-29

## 2022-11-29 DIAGNOSIS — R39.89 SUSPECTED UTI: Primary | ICD-10-CM

## 2022-11-29 PROCEDURE — 99213 OFFICE O/P EST LOW 20 MIN: CPT | Performed by: NURSE PRACTITIONER

## 2022-11-29 RX ORDER — NITROFURANTOIN 25; 75 MG/1; MG/1
100 CAPSULE ORAL 2 TIMES DAILY
Qty: 10 CAPSULE | Refills: 0 | Status: SHIPPED | OUTPATIENT
Start: 2022-11-29 | End: 2022-12-04

## 2022-11-29 RX ORDER — PHENAZOPYRIDINE HYDROCHLORIDE 200 MG/1
200 TABLET, FILM COATED ORAL 3 TIMES DAILY PRN
Qty: 6 TABLET | Refills: 0 | Status: SHIPPED | OUTPATIENT
Start: 2022-11-29 | End: 2022-12-01

## 2022-11-29 NOTE — PROGRESS NOTES
Subjective   Chief Complaint   Patient presents with   • Urinary Tract Infection              Lilly Segovia is a 27 y.o. female.     History of Present Illness  Patient reports urinary frequency, urgency and burning for 2 days.  She has had UTIs in the past with similar symptoms.  Urinary Tract Infection   This is a new problem. Episode onset: 2 days. The problem has been unchanged. The quality of the pain is described as burning. There has been no fever. There is no history of pyelonephritis. Associated symptoms include frequency and urgency. Pertinent negatives include no chills, discharge, flank pain, hematuria, hesitancy, nausea, possible pregnancy, sweats or vomiting. She has tried nothing for the symptoms.        Allergies   Allergen Reactions   • Cetirizine Anaphylaxis and Unknown - High Severity       Past Medical History:   Diagnosis Date   • Acid reflux    • ADHD (attention deficit hyperactivity disorder)    • Allergic    • Anemia 08/06/2019   • Asthma    • Disease of thyroid gland    • Dysthymic disorder 05/03/2021    Formatting of this note might be different from the original.  Saw counselors and took medications as a teenager. Was in a mental hospital at one point. Unsure what the diagnoses were. States all medications (lexapro, paxil, prozac) made her feel like a zombie.  In early 2020, tried to take citalopram liquid for 1 week, had side effects (felt drugged up). Stopped medication. Feels like anxiety com   • Hypothyroidism    • Kidney agenesis 04/19/2021    Formatting of this note might be different from the original.  Went to ER in April 2021 for pain, had CT scan which showed lack of a kidney.   • Migraine 03/21/2019   • Preeclampsia 12/02/19   • PTSD (post-traumatic stress disorder) 12/01/2020    Formatting of this note might be different from the original.  Prior panic attacks.  Went on trip last month and had some anxiety over it.  Worried about a possible COVID exposure but, did not come down with  it.   Feels like her anxiety is getting worse.  She is not working, she's at home all day with her daughter and the dog.  She feels like her anxiety is turning into rage, like she needs to yel   • Varicella        Past Surgical History:   Procedure Laterality Date   • WISDOM TOOTH EXTRACTION N/A        Social History     Socioeconomic History   • Marital status: Single   • Number of children: 1   • Highest education level: High school graduate   Tobacco Use   • Smoking status: Former     Packs/day: 0.50     Years: 5.00     Pack years: 2.50     Types: Cigarettes     Quit date: 2014     Years since quittin.9   • Smokeless tobacco: Never   Vaping Use   • Vaping Use: Never used   Substance and Sexual Activity   • Alcohol use: Not Currently     Alcohol/week: 1.0 - 3.0 standard drink     Types: 1 - 3 Glasses of wine per week   • Drug use: Not Currently     Types: Marijuana   • Sexual activity: Yes     Partners: Male     Birth control/protection: None       Family History   Problem Relation Age of Onset   • Anxiety disorder Mother    • Depression Mother    • Hypertension Mother    • Diabetes Mother    • Diverticulitis Mother    • Pancreatitis Mother    • Cholelithiasis Mother    • Depression Maternal Aunt    • Anxiety disorder Maternal Aunt    • Anxiety disorder Maternal Grandmother    • Depression Maternal Grandmother          Current Outpatient Medications:   •  albuterol sulfate  (90 Base) MCG/ACT inhaler, Inhale 2 puffs Every 4 (Four) Hours As Needed for Wheezing or Shortness of Air., Disp: 18 g, Rfl: 2  •  escitalopram (Lexapro) 10 MG tablet, Take 1.5 tablets by mouth Daily., Disp: 45 tablet, Rfl: 0  •  fluticasone (Flonase) 50 MCG/ACT nasal spray, 2 sprays into the nostril(s) as directed by provider Daily., Disp: 16 g, Rfl: 11  •  lamoTRIgine (LaMICtal) 25 MG tablet, Take 1 tablet by mouth Daily x2 weeks then increase to 2 tablets by mouth Daily., Disp: 45 tablet, Rfl: 0  •  levothyroxine  (SYNTHROID, LEVOTHROID) 100 MCG tablet, Take 1 tablet by mouth Daily., Disp: 30 tablet, Rfl: 3  •  nitrofurantoin, macrocrystal-monohydrate, (Macrobid) 100 MG capsule, Take 1 capsule by mouth 2 (Two) Times a Day for 5 days., Disp: 10 capsule, Rfl: 0  •  omeprazole (priLOSEC) 40 MG capsule, Take 1 capsule by mouth Daily., Disp: 90 capsule, Rfl: 1  •  phenazopyridine (Pyridium) 200 MG tablet, Take 1 tablet by mouth 3 (Three) Times a Day As Needed for Bladder Spasms for up to 2 days., Disp: 6 tablet, Rfl: 0  •  prazosin (MINIPRESS) 2 MG capsule, Take 1 capsule by mouth Every Night., Disp: 30 capsule, Rfl: 0  •  QUEtiapine (SEROquel) 25 MG tablet, Take 0.5-1 tablets by mouth 2 (Two) Times a Day As Needed (anxiety/panic)., Disp: 60 tablet, Rfl: 0      Review of Systems   Constitutional: Negative for chills, diaphoresis, fatigue and fever.   Gastrointestinal: Negative for abdominal distention, abdominal pain, nausea and vomiting.   Genitourinary: Positive for dysuria, frequency and urgency. Negative for decreased urine volume, flank pain, genital sores, hematuria, hesitancy, urinary incontinence, vaginal bleeding, vaginal discharge and vaginal pain.   Musculoskeletal: Negative for myalgias.        There were no vitals filed for this visit.    Objective   Physical Exam  Constitutional:       General: She is not in acute distress.     Appearance: Normal appearance. She is not ill-appearing, toxic-appearing or diaphoretic.   HENT:      Head: Normocephalic.      Mouth/Throat:      Lips: Pink.      Mouth: Mucous membranes are moist.   Pulmonary:      Effort: Pulmonary effort is normal.   Abdominal:      Tenderness: There is no abdominal tenderness (per pt).   Neurological:      Mental Status: She is alert and oriented to person, place, and time.   Psychiatric:         Mood and Affect: Mood normal.         Behavior: Behavior normal.          Procedures     Assessment & Plan   Diagnoses and all orders for this visit:    1.  Suspected UTI (Primary)  -     nitrofurantoin, macrocrystal-monohydrate, (Macrobid) 100 MG capsule; Take 1 capsule by mouth 2 (Two) Times a Day for 5 days.  Dispense: 10 capsule; Refill: 0  -     phenazopyridine (Pyridium) 200 MG tablet; Take 1 tablet by mouth 3 (Three) Times a Day As Needed for Bladder Spasms for up to 2 days.  Dispense: 6 tablet; Refill: 0      Wipe front to back, increase water to flush the kidneys and avoid bladder irritants such as caffeine and alcohol.    -Warning signs: severe abdominal/pelvic/back pain, fever >101, blood in urine - seek medical attention as soon as possible for a hands on/objective exam and possible labs.     If symptoms worsen or do not improve follow up with your PCP or visit your nearest Urgent Care Center or ER.      PLAN: Discussed dosing, side effects, recommended other symptomatic care.  Patient should follow up with primary care provider, Urgent Care or ER if symptoms worsen, fail to resolve or other symptoms need attention. Patient/family agree to the above.         EDGARDO Roy     The use of a video visit has been reviewed with the patient and verbal informed consent has been obtained. Myself and Lilly Juliette participated in this visit. The patient is located at [address]  [place] [facility] [address]. I am located in [place], [place]. Mychart and Zoom were utilized.        This visit was performed via Telehealth.  This patient has been instructed to follow-up with their primary care provider if their symptoms worsen or the treatment provided does not resolve their illness.

## 2022-12-30 DIAGNOSIS — E03.9 ACQUIRED HYPOTHYROIDISM: ICD-10-CM

## 2022-12-30 RX ORDER — LEVOTHYROXINE SODIUM 0.1 MG/1
100 TABLET ORAL DAILY
Qty: 30 TABLET | Refills: 3 | Status: SHIPPED | OUTPATIENT
Start: 2022-12-30

## 2023-01-20 ENCOUNTER — OFFICE VISIT (OUTPATIENT)
Dept: INTERNAL MEDICINE | Facility: CLINIC | Age: 28
End: 2023-01-20
Payer: COMMERCIAL

## 2023-01-20 VITALS
DIASTOLIC BLOOD PRESSURE: 88 MMHG | OXYGEN SATURATION: 99 % | RESPIRATION RATE: 18 BRPM | HEIGHT: 64 IN | WEIGHT: 293 LBS | HEART RATE: 87 BPM | SYSTOLIC BLOOD PRESSURE: 132 MMHG | BODY MASS INDEX: 50.02 KG/M2 | TEMPERATURE: 98 F

## 2023-01-20 DIAGNOSIS — M54.41 ACUTE RIGHT-SIDED LOW BACK PAIN WITH RIGHT-SIDED SCIATICA: Primary | ICD-10-CM

## 2023-01-20 DIAGNOSIS — Z91.89 AT RISK FOR SLEEP APNEA: ICD-10-CM

## 2023-01-20 DIAGNOSIS — E66.01 MORBID OBESITY: ICD-10-CM

## 2023-01-20 PROCEDURE — 99214 OFFICE O/P EST MOD 30 MIN: CPT | Performed by: NURSE PRACTITIONER

## 2023-01-20 RX ORDER — CYCLOBENZAPRINE HCL 10 MG
10 TABLET ORAL 3 TIMES DAILY PRN
Qty: 30 TABLET | Refills: 0 | Status: SHIPPED | OUTPATIENT
Start: 2023-01-20

## 2023-01-20 RX ORDER — CYCLOBENZAPRINE HCL 10 MG
10 TABLET ORAL 3 TIMES DAILY PRN
COMMUNITY
End: 2023-01-20 | Stop reason: SDUPTHER

## 2023-01-20 NOTE — PROGRESS NOTES
Lilly Segovia presents to [facility] for     Chief Complaint  Chief Complaint   Patient presents with   • Sleep Apnea   • Flank Pain     Flank pain, x9d   [facility] 1.18.23       PCP:  Dior Bach APRN    Subjective        Back Pain  This is a new problem. The current episode started 1 to 4 weeks ago. The problem occurs constantly. The problem is unchanged. The pain is present in the lumbar spine. The quality of the pain is described as aching. The pain radiates to the right thigh. The pain is at a severity of 7/10. The symptoms are aggravated by bending, twisting and standing. Pertinent negatives include no abdominal pain, bladder incontinence, bowel incontinence, chest pain, fever, paresthesias, pelvic pain, perianal numbness, weakness or weight loss. Risk factors include sedentary lifestyle and obesity. She has tried nothing for the symptoms. The treatment provided no relief.   She has similar pain in the past when she slipped a disc.   She was evaluated in [place] emergency department on 1/18/2023 for this flank pain.  She had a CT of her abdomen or pelvis which showed some gallstones but no other acute disease she was prescribed cyclobenzaprine but she has not taken this yet.  BMP, CBC, and urinalysis did not detect obvious cause for pain.       she feels like she may have sleep apnea.   She does not feel refreshed no matter how much sleep she gets   she does snore.   She has awakened at night gasping before.   Does tend to just wake up a lot.   She has never been tested for sleep apnea.     She is obese.  Weight is trending upwards.  Diet unhealthy.  Exercise limited.  Wt Readings from Last 3 Encounters:   01/20/23 (!) 146 kg (321 lb)   08/22/22 (!) 140 kg (308 lb)   08/05/22 (!) 138 kg (303 lb 6.4 oz)         Review of Systems   Constitutional: Positive for unexpected weight gain. Negative for fatigue, fever and unexpected weight loss.   Eyes: Negative for blurred  vision, double vision, pain and visual disturbance.   Respiratory: Positive for apnea. Negative for cough, chest tightness, shortness of breath and wheezing.    Cardiovascular: Negative for chest pain, palpitations and leg swelling.   Gastrointestinal: Negative for abdominal pain, bowel incontinence, constipation, diarrhea, nausea and vomiting.   Genitourinary: Negative for difficulty urinating, frequency, pelvic pain, urgency and urinary incontinence.   Musculoskeletal: Positive for back pain. Negative for arthralgias and myalgias.   Skin: Negative for color change and rash.   Neurological: Negative for dizziness, weakness, light-headedness, headache, paresthesias and confusion.   Hematological: Negative for adenopathy. Does not bruise/bleed easily.         Allergies   Allergen Reactions   • Cetirizine Anaphylaxis and Unknown - High Severity     Current Outpatient Medications on File Prior to Visit   Medication Sig Dispense Refill   • albuterol sulfate  (90 Base) MCG/ACT inhaler Inhale 2 puffs Every 4 (Four) Hours As Needed for Wheezing or Shortness of Air. 18 g 2   • escitalopram (Lexapro) 10 MG tablet Take 1.5 tablets by mouth Daily. 45 tablet 0   • fluticasone (Flonase) 50 MCG/ACT nasal spray 2 sprays into the nostril(s) as directed by provider Daily. 16 g 11   • lamoTRIgine (LaMICtal) 25 MG tablet Take 1 tablet by mouth Daily x2 weeks then increase to 2 tablets by mouth Daily. 45 tablet 0   • levothyroxine (SYNTHROID, LEVOTHROID) 100 MCG tablet Take 1 tablet by mouth Daily. 30 tablet 3   • omeprazole (priLOSEC) 40 MG capsule Take 1 capsule by mouth Daily. 90 capsule 1   • prazosin (MINIPRESS) 2 MG capsule Take 1 capsule by mouth Every Night. 30 capsule 0   • QUEtiapine (SEROquel) 25 MG tablet Take 0.5-1 tablets by mouth 2 (Two) Times a Day As Needed (anxiety/panic). 60 tablet 0     No current facility-administered medications on file prior to visit.         The following portions of the patient's history  "were reviewed and updated as appropriate: allergies, current medications, past family history, past medical history, past social history, past surgical history and problem list and are available for review within electronic record    Objective     Result Review :                    Vital Signs:   /88   Pulse 87   Temp 98 °F (36.7 °C) (Infrared)   Resp 18   Ht 162.6 cm (64\")   Wt (!) 146 kg (321 lb)   SpO2 99%   BMI 55.10 kg/m²         Physical Exam  Vitals and nursing note reviewed.   Constitutional:       General: She is not in acute distress.     Appearance: Normal appearance. She is well-developed. She is morbidly obese. She is not ill-appearing or diaphoretic.   HENT:      Head: Normocephalic and atraumatic.   Eyes:      General: No scleral icterus.     Conjunctiva/sclera: Conjunctivae normal.   Neck:      Vascular: No JVD.   Cardiovascular:      Rate and Rhythm: Normal rate and regular rhythm.      Chest Wall: PMI is not displaced. No thrill.      Heart sounds: Normal heart sounds. No murmur heard.  Pulmonary:      Effort: Pulmonary effort is normal. No accessory muscle usage or respiratory distress.      Breath sounds: Normal breath sounds.   Chest:      Chest wall: No tenderness.   Abdominal:      General: Bowel sounds are normal. There is no distension.      Palpations: Abdomen is soft.      Tenderness: There is no abdominal tenderness.   Musculoskeletal:         General: Normal range of motion.      Cervical back: Normal range of motion.      Lumbar back: Tenderness present. No swelling, deformity, lacerations, spasms or bony tenderness. Normal range of motion. Negative right straight leg raise test and negative left straight leg raise test.      Right lower leg: No edema.      Left lower leg: No edema.   Skin:     General: Skin is warm and dry.      Coloration: Skin is not ashen, jaundiced, mottled or pale.      Findings: No erythema.   Neurological:      General: No focal deficit present.     "  Mental Status: She is alert and oriented to person, place, and time.   Psychiatric:         Attention and Perception: Attention normal.         Mood and Affect: Mood and affect normal.         Behavior: Behavior normal. Behavior is cooperative.         Thought Content: Thought content normal.         Cognition and Memory: Cognition normal.         Judgment: Judgment normal.               Assessment and Plan      Diagnoses and all orders for this visit:    1. Acute right-sided low back pain with right-sided sciatica (Primary)  -     Ambulatory Referral to Physical Therapy Evaluate and treat  -     cyclobenzaprine (FLEXERIL) 10 MG tablet; Take 1 tablet by mouth 3 (Three) Times a Day As Needed for Muscle Spasms.  Dispense: 30 tablet; Refill: 0  No red flags on exam.   Ok to use tylenol, heat, and flexeril.   Will refer to PT as well.     2. At risk for sleep apnea  -     Ambulatory Referral to Sleep Medicine  Refer to sleep med for eval/treatment  3. Morbid obesity (HCC)  -     Ambulatory Referral to Bariatric Surgery    Chronic, worsening. Class 3 Severe Obesity (BMI >=40). Obesity-related health conditions include the following: GERD. Obesity is worsening. BMI is is above average; BMI management plan is completed. We discussed low calorie, low carb based diet program, portion control, increasing exercise and consulting a Bariatric surgeon.      Follow Up     Patient was given instructions and counseling regarding her condition or for health maintenance advice. Please see specific information pulled into the AVS if appropriate.   Any new medication's adverse effects have been discussed in detail with patient  Patient was encouraged to keep me informed of any acute changes, lack of improvement, or any new concerning symptoms.    Return in about 3 months (around 4/20/2023), or if symptoms worsen or fail to improve, for Annual.          Dictated Utilizing Dragon Dictation   Please note that portions of this note were  completed with a voice recognition program.   Part of this note may be an electronic transcription/translation of spoken language to printed text using the Dragon Dictation System.

## 2023-02-25 ENCOUNTER — TELEMEDICINE (OUTPATIENT)
Dept: FAMILY MEDICINE CLINIC | Facility: TELEHEALTH | Age: 28
End: 2023-02-25
Payer: COMMERCIAL

## 2023-02-25 VITALS — BODY MASS INDEX: 50.02 KG/M2 | WEIGHT: 293 LBS | HEIGHT: 64 IN

## 2023-02-25 DIAGNOSIS — J01.40 ACUTE PANSINUSITIS, RECURRENCE NOT SPECIFIED: ICD-10-CM

## 2023-02-25 DIAGNOSIS — U09.9 POST COVID-19 CONDITION, UNSPECIFIED: Primary | ICD-10-CM

## 2023-02-25 PROCEDURE — 99213 OFFICE O/P EST LOW 20 MIN: CPT | Performed by: NURSE PRACTITIONER

## 2023-02-25 RX ORDER — OXYMETAZOLINE HYDROCHLORIDE 0.05 G/100ML
2 SPRAY NASAL 2 TIMES DAILY
Qty: 15 ML | Refills: 0 | Status: SHIPPED | OUTPATIENT
Start: 2023-02-25 | End: 2023-02-28

## 2023-02-25 RX ORDER — AMOXICILLIN 875 MG/1
875 TABLET, COATED ORAL 2 TIMES DAILY
Qty: 20 TABLET | Refills: 0 | Status: SHIPPED | OUTPATIENT
Start: 2023-02-25 | End: 2023-03-07

## 2023-02-25 RX ORDER — GUAIFENESIN 200 MG/10ML
400 LIQUID ORAL 3 TIMES DAILY PRN
Qty: 355 ML | Refills: 0 | Status: SHIPPED | OUTPATIENT
Start: 2023-02-25

## 2023-02-25 RX ORDER — AMOXICILLIN 400 MG/5ML
POWDER, FOR SUSPENSION ORAL
Qty: 200 ML | Refills: 0 | Status: SHIPPED | OUTPATIENT
Start: 2023-02-25 | End: 2023-02-25 | Stop reason: CLARIF

## 2023-02-25 NOTE — PROGRESS NOTES
You have chosen to receive care through a telehealth visit.  Do you consent to use a video/audio connection for your medical care today? Yes     CHIEF COMPLAINT  Cc: post COVID sinus problem    HPI  Lilly Segovia is a 27 y.o. female  presents with complaint of  post COVID sinus problem. She reports that she tested positive for covid almost two weeks ago and now thinks that she may have a sinus infection. She tested positive for COVID 02/15/2023. She did have a family exposure to COVID. She is vaccinated for COVID via the COVID Pfizer vaccine. She feels like now she has gone into a sinus infection. She reports orange, dark green nasal congestion, swollen nares, and sinus pain and pressure. Additional symptoms that she is having are noted in the ROS portion of this visit. Over the counter she has tried, sudafed, Dayquil, Nyquil and cough drops without relief of her symptoms. She has also been using Flonase but wonders if there is another nasal spray that she could try as the Flonase is not helping her a this time. She does not think that she is pregnant but does report having unprotected sex.    Review of Systems   Constitutional: Positive for fatigue. Negative for fever.   HENT: Positive for congestion (green and orange), postnasal drip, rhinorrhea, sinus pressure, sinus pain, sneezing (mild) and sore throat (from draining).    Respiratory: Positive for cough and shortness of breath. Negative for wheezing.    Gastrointestinal: Negative for diarrhea, nausea and vomiting.        Reflux   Musculoskeletal: Negative for myalgias.   Neurological: Positive for headaches.       Past Medical History:   Diagnosis Date   • Acid reflux    • ADHD (attention deficit hyperactivity disorder)    • Allergic    • Anemia 08/06/2019   • Asthma    • COVID 02/15/2022   • Disease of thyroid gland    • Dysthymic disorder 05/03/2021    Formatting of this note might be different from the original.  Saw counselors and took medications as a teenager.  Was in a mental hospital at one point. Unsure what the diagnoses were. States all medications (lexapro, paxil, prozac) made her feel like a zombie.  In early , tried to take citalopram liquid for 1 week, had side effects (felt drugged up). Stopped medication. Feels like anxiety com   • Hypothyroidism    • Kidney agenesis 2021    Formatting of this note might be different from the original.  Went to ER in 2021 for pain, had CT scan which showed lack of a kidney.   • Migraine 2019   • Preeclampsia 19   • PTSD (post-traumatic stress disorder) 2020    Formatting of this note might be different from the original.  Prior panic attacks.  Went on trip last month and had some anxiety over it.  Worried about a possible COVID exposure but, did not come down with it.   Feels like her anxiety is getting worse.  She is not working, she's at home all day with her daughter and the dog.  She feels like her anxiety is turning into rage, like she needs to yel   • Varicella        Family History   Problem Relation Age of Onset   • Anxiety disorder Mother    • Depression Mother    • Hypertension Mother    • Diabetes Mother    • Diverticulitis Mother    • Pancreatitis Mother    • Cholelithiasis Mother    • Depression Maternal Aunt    • Anxiety disorder Maternal Aunt    • Anxiety disorder Maternal Grandmother    • Depression Maternal Grandmother        Social History     Socioeconomic History   • Marital status: Single   • Number of children: 1   • Highest education level: High school graduate   Tobacco Use   • Smoking status: Former     Packs/day: 0.50     Years: 5.00     Pack years: 2.50     Types: Cigarettes     Quit date: 2014     Years since quittin.1   • Smokeless tobacco: Never   Vaping Use   • Vaping Use: Never used   Substance and Sexual Activity   • Alcohol use: Not Currently     Alcohol/week: 1.0 - 3.0 standard drink     Types: 1 - 3 Glasses of wine per week   • Drug use: Not Currently  "    Types: Marijuana   • Sexual activity: Yes     Partners: Male     Birth control/protection: None       Lilly Segovia  reports that she quit smoking about 9 years ago. Her smoking use included cigarettes. She has a 2.50 pack-year smoking history. She has never used smokeless tobacco..       Ht 162.6 cm (64\")   Wt (!) 146 kg (321 lb)   Breastfeeding No   BMI 55.10 kg/m²     PHYSICAL EXAM  Physical Exam   Constitutional: She is oriented to person, place, and time. She appears well-developed and well-nourished.   HENT:   Head: Normocephalic and atraumatic.   Right Ear: External ear normal.   Left Ear: External ear normal.   Nose: Congestion present. Right sinus exhibits maxillary sinus tenderness (patient directed exam) and frontal sinus tenderness (patient directed exam). Left sinus exhibits maxillary sinus tenderness (patient directed exam) and frontal sinus tenderness (patient directed exam).   Eyes: Lids are normal. Right eye exhibits no discharge and no exudate. Left eye exhibits no discharge and no exudate. Right conjunctiva is not injected. Left conjunctiva is not injected.   Pulmonary/Chest: No accessory muscle usage. No tachypnea and no bradypnea.  No respiratory distress.No use of oxygen by nasal cannulaNo use of oxygen by mask noted.  Neurological: She is alert and oriented to person, place, and time. No cranial nerve deficit.   Skin: Her skin appears normal.  Psychiatric: She has a normal mood and affect. Her speech is normal and behavior is normal. Judgment and thought content normal.       Results for orders placed or performed in visit on 08/25/22   Fecal Leukocytes - Stool, Per Rectum    Specimen: Per Rectum; Stool   Result Value Ref Range    Fecal Leukocytes No WBC's Seen No WBC's Seen   Stool Culture (Reference Lab) - Stool, Per Rectum    Specimen: Per Rectum; Stool   Result Value Ref Range    Salmonella/Shigella Screen Final report     Result 1 Comment     Campylobacter Culture Final report     " Result 1 Comment     E coli, Shiga toxin Assay Negative Negative   Ova & Parasite Examination - Stool, Per Rectum    Specimen: Per Rectum; Stool   Result Value Ref Range    Ova + Parasite Exam No ova or parasites seen on concentrated smear No Ova or Parasites Seen    Trichrome Stain No ova or parasites seen on trichrome stain    Occult Blood X 1, Stool - Stool, Per Rectum    Specimen: Per Rectum; Stool   Result Value Ref Range    Fecal Occult Blood Negative Negative       Diagnoses and all orders for this visit:    1. Post covid-19 condition, unspecified (Primary)    2. Acute pansinusitis, recurrence not specified    Other orders  -     Discontinue: amoxicillin (AMOXIL) 400 MG/5ML suspension; 10 ml twice daily for 10 days  Dispense: 200 mL; Refill: 0  -     guaifenesin (ROBITUSSIN) 100 MG/5ML liquid; Take 20 mL by mouth 3 (Three) Times a Day As Needed for Cough.  Dispense: 355 mL; Refill: 0  -     oxymetazoline (Afrin Nasal Spray) 0.05 % nasal spray; 2 sprays into the nostril(s) as directed by provider 2 (Two) Times a Day for 3 days. Do not use more than 3 days  Dispense: 15 mL; Refill: 0  -     amoxicillin (AMOXIL) 875 MG tablet; Take 1 tablet by mouth 2 (Two) Times a Day for 10 days.  Dispense: 20 tablet; Refill: 0    Probiotics for two weeks related to taking antibiotics. The pharmacist can help you with this if needed. Do not take within two hours of antibiotic.  Mucinex with plenty of fluids especially water to thin secretions and help with congestion.  Afrin as directed, do not use more than 3 days   Hydrate well    FOLLOW-UP  If symptoms worsen or persist follow up with PCP, Virtual Care or Urgent Care    Patient verbalizes understanding of medication dosage, comfort measures, instructions for treatment and follow-up.    Gia Payne, EDGARDO  02/25/2023  18:27 EST    The use of a video visit has been reviewed with the patient and verbal informed consent has been obtained. Myself and Lilly Segovia participated in  this visit. The patient is located in [facility] [address] [place] [id number].    I am located in [place], [place]. [employer] and [employer] were utilized. I spent 25 minutes in the patient's chart for this visit.

## 2023-02-26 NOTE — PATIENT INSTRUCTIONS
Sinusitis, Adult  Sinusitis is inflammation of your sinuses. Sinuses are hollow spaces in the bones around your face. Your sinuses are located:  Around your eyes.  In the middle of your forehead.  Behind your nose.  In your cheekbones.  Mucus normally drains out of your sinuses. When your nasal tissues become inflamed or swollen, mucus can become trapped or blocked. This allows bacteria, viruses, and fungi to grow, which leads to infection. Most infections of the sinuses are caused by a virus.  Sinusitis can develop quickly. It can last for up to 4 weeks (acute) or for more than 12 weeks (chronic). Sinusitis often develops after a cold.  What are the causes?  This condition is caused by anything that creates swelling in the sinuses or stops mucus from draining. This includes:  Allergies.  Asthma.  Infection from bacteria or viruses.  Deformities or blockages in your nose or sinuses.  Abnormal growths in the nose (nasal polyps).  Pollutants, such as chemicals or irritants in the air.  Infection from fungi (rare).  What increases the risk?  You are more likely to develop this condition if you:  Have a weak body defense system (immune system).  Do a lot of swimming or diving.  Overuse nasal sprays.  Smoke.  What are the signs or symptoms?  The main symptoms of this condition are pain and a feeling of pressure around the affected sinuses. Other symptoms include:  Stuffy nose or congestion.  Thick drainage from your nose.  Swelling and warmth over the affected sinuses.  Headache.  Upper toothache.  A cough that may get worse at night.  Extra mucus that collects in the throat or the back of the nose (postnasal drip).  Decreased sense of smell and taste.  Fatigue.  A fever.  Sore throat.  Bad breath.  How is this diagnosed?  This condition is diagnosed based on:  Your symptoms.  Your medical history.  A physical exam.  Tests to find out if your condition is acute or chronic. This may include:  Checking your nose for nasal  polyps.  Viewing your sinuses using a device that has a light (endoscope).  Testing for allergies or bacteria.  Imaging tests, such as an MRI or CT scan.  In rare cases, a bone biopsy may be done to rule out more serious types of fungal sinus disease.  How is this treated?  Treatment for sinusitis depends on the cause and whether your condition is chronic or acute.  If caused by a virus, your symptoms should go away on their own within 10 days. You may be given medicines to relieve symptoms. They include:  Medicines that shrink swollen nasal passages (topical intranasal decongestants).  Medicines that treat allergies (antihistamines).  A spray that eases inflammation of the nostrils (topical intranasal corticosteroids).  Rinses that help get rid of thick mucus in your nose (nasal saline washes).  If caused by bacteria, your health care provider may recommend waiting to see if your symptoms improve. Most bacterial infections will get better without antibiotic medicine. You may be given antibiotics if you have:  A severe infection.  A weak immune system.  If caused by narrow nasal passages or nasal polyps, you may need to have surgery.  Follow these instructions at home:  Medicines  Take, use, or apply over-the-counter and prescription medicines only as told by your health care provider. These may include nasal sprays.  If you were prescribed an antibiotic medicine, take it as told by your health care provider. Do not stop taking the antibiotic even if you start to feel better.  Hydrate and humidify    Drink enough fluid to keep your urine pale yellow. Staying hydrated will help to thin your mucus.  Use a cool mist humidifier to keep the humidity level in your home above 50%.  Inhale steam for 10-15 minutes, 3-4 times a day, or as told by your health care provider. You can do this in the bathroom while a hot shower is running.  Limit your exposure to cool or dry air.  Rest  Rest as much as possible.  Sleep with your  head raised (elevated).  Make sure you get enough sleep each night.  General instructions    Apply a warm, moist washcloth to your face 3-4 times a day or as told by your health care provider. This will help with discomfort.  Wash your hands often with soap and water to reduce your exposure to germs. If soap and water are not available, use hand .  Do not smoke. Avoid being around people who are smoking (secondhand smoke).  Keep all follow-up visits as told by your health care provider. This is important.  Contact a health care provider if:  You have a fever.  Your symptoms get worse.  Your symptoms do not improve within 10 days.  Get help right away if:  You have a severe headache.  You have persistent vomiting.  You have severe pain or swelling around your face or eyes.  You have vision problems.  You develop confusion.  Your neck is stiff.  You have trouble breathing.  Summary  Sinusitis is soreness and inflammation of your sinuses. Sinuses are hollow spaces in the bones around your face.  This condition is caused by nasal tissues that become inflamed or swollen. The swelling traps or blocks the flow of mucus. This allows bacteria, viruses, and fungi to grow, which leads to infection.  If you were prescribed an antibiotic medicine, take it as told by your health care provider. Do not stop taking the antibiotic even if you start to feel better.  Keep all follow-up visits as told by your health care provider. This is important.  This information is not intended to replace advice given to you by your health care provider. Make sure you discuss any questions you have with your health care provider.  Document Revised: 05/20/2019 Document Reviewed: 05/20/2019  [employer] Patient Education © 2022 [employer]

## 2023-02-27 ENCOUNTER — PREP FOR SURGERY (OUTPATIENT)
Dept: OTHER | Facility: HOSPITAL | Age: 28
End: 2023-02-27
Payer: COMMERCIAL

## 2023-02-27 DIAGNOSIS — K92.1 BLOODY STOOL: Primary | ICD-10-CM

## 2023-03-03 DIAGNOSIS — J45.20 MILD INTERMITTENT ASTHMA WITHOUT COMPLICATION: ICD-10-CM

## 2023-03-03 NOTE — TELEPHONE ENCOUNTER
Rx Refill Note  Requested Prescriptions     Pending Prescriptions Disp Refills   • ProAir  (90 Base) MCG/ACT inhaler [Pharmacy Med Name: PROAIR HFA 90 MCG INHALER 108 (90 BAS Aerosol] 8.5 g 2     Sig: INHALE 2 PUFFS BY MOUTH EVERY 4 HOURS AS NEEDED FOR WHEEZING OR SHORTNESS OF AIR      Last office visit with prescribing clinician: 1/20/2023     Next office visit with prescribing clinician: 4/21/2023       Yvette Cortez MA  03/03/23, 16:52 EST

## 2023-03-06 ENCOUNTER — TELEPHONE (OUTPATIENT)
Dept: INTERNAL MEDICINE | Facility: CLINIC | Age: 28
End: 2023-03-06
Payer: COMMERCIAL

## 2023-03-06 NOTE — TELEPHONE ENCOUNTER
Caller: [facility] - [place], [place] - [address] [facility] - [phone]  - [phone]    Relationship: Pharmacy    Best call back number: [phone]    What is the best time to reach you: M-F 8AM-7PM     Who are you requesting to speak with (clinical staff, provider,  specific staff member): ANDERS LOPEZ    What was the call regarding: PROAIR INHALER IS NO LONGER MADE AND THE PRESCRIPTION WAS SENT OVER WITH A DAVID 1. THEY ARE NEEDING A VERBAL OK TO SWITCH THIS INHALER.     Do you require a callback: YES

## 2023-03-17 ENCOUNTER — TELEPHONE (OUTPATIENT)
Dept: GASTROENTEROLOGY | Facility: CLINIC | Age: 28
End: 2023-03-17
Payer: COMMERCIAL

## 2023-03-17 PROBLEM — K92.1 BLOODY STOOL: Status: ACTIVE | Noted: 2023-03-17

## 2023-03-17 NOTE — TELEPHONE ENCOUNTER
Called patient to move procedure to the hospital due to BMI.     I left a voicemail for her to call us back.

## 2023-03-18 RX ORDER — SODIUM, POTASSIUM,MAG SULFATES 17.5-3.13G
2 SOLUTION, RECONSTITUTED, ORAL ORAL TAKE AS DIRECTED
Qty: 354 ML | Refills: 0 | Status: SHIPPED | OUTPATIENT
Start: 2023-03-18

## 2023-03-22 ENCOUNTER — TELEPHONE (OUTPATIENT)
Dept: INTERNAL MEDICINE | Facility: CLINIC | Age: 28
End: 2023-03-22
Payer: COMMERCIAL

## 2023-03-22 NOTE — TELEPHONE ENCOUNTER
They will give her sedation with that so I don't want to giver her anything that will interfere with that

## 2023-03-22 NOTE — TELEPHONE ENCOUNTER
Caller: Lilly Segovia    Relationship: Self    Best call back number: [phone]    What medication are you requesting: SOMETHING TO CALM HER DOWN BEFORE HER COLONOSCOPY PROCEDURE    What are your current symptoms:     How long have you been experiencing symptoms:     Have you had these symptoms before:    [] Yes  [x] No    Have you been treated for these symptoms before:   [] Yes  [x] No    If a prescription is needed, what is your preferred pharmacy and phone number:    [facility] [phone]  Additional notes:  PATIENT HAS CALLED REQUESTING IF SOMETHING CAN BE PRESCRIBED  TO CALM HER DOWN IN PREPARATION FOR UPCOMING COLONOSCOPY PROCEDURE THAT IS SCHEDULED FOR TOMORROW.\  PATIENT HAS  REQUESTED A CALL BACK EITHER WAY TO LET HER KNOW IF SOMETHING WILL BE CALLED IN.

## 2023-03-23 ENCOUNTER — ANESTHESIA (OUTPATIENT)
Dept: GASTROENTEROLOGY | Facility: HOSPITAL | Age: 28
End: 2023-03-23
Payer: COMMERCIAL

## 2023-03-23 ENCOUNTER — HOSPITAL ENCOUNTER (OUTPATIENT)
Facility: HOSPITAL | Age: 28
Setting detail: HOSPITAL OUTPATIENT SURGERY
Discharge: HOME | End: 2023-03-23
Attending: INTERNAL MEDICINE | Admitting: INTERNAL MEDICINE
Payer: COMMERCIAL

## 2023-03-23 ENCOUNTER — ANESTHESIA EVENT (OUTPATIENT)
Dept: GASTROENTEROLOGY | Facility: HOSPITAL | Age: 28
End: 2023-03-23
Payer: COMMERCIAL

## 2023-03-23 VITALS
RESPIRATION RATE: 18 BRPM | HEART RATE: 68 BPM | SYSTOLIC BLOOD PRESSURE: 121 MMHG | OXYGEN SATURATION: 97 % | TEMPERATURE: 97 F | DIASTOLIC BLOOD PRESSURE: 102 MMHG

## 2023-03-23 LAB
B-HCG UR QL: NEGATIVE
EXPIRATION DATE: NORMAL
HCT VFR BLD AUTO: 40 % (ref 34–46.6)
INTERNAL NEGATIVE CONTROL: NEGATIVE
INTERNAL POSITIVE CONTROL: POSITIVE
Lab: NORMAL
POTASSIUM SERPL-SCNC: 3.9 MMOL/L (ref 3.5–5.2)

## 2023-03-23 PROCEDURE — 84132 ASSAY OF SERUM POTASSIUM: CPT | Performed by: ANESTHESIOLOGY

## 2023-03-23 PROCEDURE — 45378 DIAGNOSTIC COLONOSCOPY: CPT | Performed by: INTERNAL MEDICINE

## 2023-03-23 PROCEDURE — 85014 HEMATOCRIT: CPT | Performed by: ANESTHESIOLOGY

## 2023-03-23 PROCEDURE — 81025 URINE PREGNANCY TEST: CPT | Performed by: ANESTHESIOLOGY

## 2023-03-23 PROCEDURE — 25010000002 PROPOFOL 10 MG/ML EMULSION: Performed by: NURSE ANESTHETIST, CERTIFIED REGISTERED

## 2023-03-23 RX ORDER — SODIUM CHLORIDE 9 MG/ML
40 INJECTION, SOLUTION INTRAVENOUS AS NEEDED
Status: DISCONTINUED | OUTPATIENT
Start: 2023-03-23 | End: 2023-03-23 | Stop reason: HOSPADM

## 2023-03-23 RX ORDER — FAMOTIDINE 10 MG/ML
20 INJECTION, SOLUTION INTRAVENOUS ONCE
Status: COMPLETED | OUTPATIENT
Start: 2023-03-23 | End: 2023-03-23

## 2023-03-23 RX ORDER — SODIUM CHLORIDE 0.9 % (FLUSH) 0.9 %
10 SYRINGE (ML) INJECTION EVERY 12 HOURS SCHEDULED
Status: DISCONTINUED | OUTPATIENT
Start: 2023-03-23 | End: 2023-03-23 | Stop reason: HOSPADM

## 2023-03-23 RX ORDER — FAMOTIDINE 20 MG/1
20 TABLET, FILM COATED ORAL ONCE
Status: CANCELLED | OUTPATIENT
Start: 2023-03-23 | End: 2023-03-23

## 2023-03-23 RX ORDER — ONDANSETRON 2 MG/ML
4 INJECTION INTRAMUSCULAR; INTRAVENOUS ONCE AS NEEDED
Status: DISCONTINUED | OUTPATIENT
Start: 2023-03-23 | End: 2023-03-23 | Stop reason: HOSPADM

## 2023-03-23 RX ORDER — LIDOCAINE HYDROCHLORIDE 10 MG/ML
INJECTION, SOLUTION EPIDURAL; INFILTRATION; INTRACAUDAL; PERINEURAL AS NEEDED
Status: DISCONTINUED | OUTPATIENT
Start: 2023-03-23 | End: 2023-03-23 | Stop reason: SURG

## 2023-03-23 RX ORDER — IPRATROPIUM BROMIDE AND ALBUTEROL SULFATE 2.5; .5 MG/3ML; MG/3ML
3 SOLUTION RESPIRATORY (INHALATION) ONCE AS NEEDED
Status: DISCONTINUED | OUTPATIENT
Start: 2023-03-23 | End: 2023-03-23 | Stop reason: HOSPADM

## 2023-03-23 RX ORDER — SODIUM CHLORIDE, SODIUM LACTATE, POTASSIUM CHLORIDE, CALCIUM CHLORIDE 600; 310; 30; 20 MG/100ML; MG/100ML; MG/100ML; MG/100ML
9 INJECTION, SOLUTION INTRAVENOUS CONTINUOUS
Status: DISCONTINUED | OUTPATIENT
Start: 2023-03-23 | End: 2023-03-23 | Stop reason: HOSPADM

## 2023-03-23 RX ORDER — SODIUM CHLORIDE, SODIUM LACTATE, POTASSIUM CHLORIDE, CALCIUM CHLORIDE 600; 310; 30; 20 MG/100ML; MG/100ML; MG/100ML; MG/100ML
INJECTION, SOLUTION INTRAVENOUS CONTINUOUS PRN
Status: DISCONTINUED | OUTPATIENT
Start: 2023-03-23 | End: 2023-03-23 | Stop reason: SURG

## 2023-03-23 RX ORDER — SODIUM CHLORIDE 0.9 % (FLUSH) 0.9 %
10 SYRINGE (ML) INJECTION AS NEEDED
Status: DISCONTINUED | OUTPATIENT
Start: 2023-03-23 | End: 2023-03-23 | Stop reason: HOSPADM

## 2023-03-23 RX ORDER — LIDOCAINE HYDROCHLORIDE 10 MG/ML
0.5 INJECTION, SOLUTION EPIDURAL; INFILTRATION; INTRACAUDAL; PERINEURAL ONCE AS NEEDED
Status: CANCELLED | OUTPATIENT
Start: 2023-03-23

## 2023-03-23 RX ADMIN — SODIUM CHLORIDE, POTASSIUM CHLORIDE, SODIUM LACTATE AND CALCIUM CHLORIDE: 600; 310; 30; 20 INJECTION, SOLUTION INTRAVENOUS at 15:38

## 2023-03-23 RX ADMIN — FAMOTIDINE 20 MG: 10 INJECTION INTRAVENOUS at 14:37

## 2023-03-23 RX ADMIN — SODIUM CHLORIDE, POTASSIUM CHLORIDE, SODIUM LACTATE AND CALCIUM CHLORIDE 9 ML/HR: 600; 310; 30; 20 INJECTION, SOLUTION INTRAVENOUS at 14:35

## 2023-03-23 RX ADMIN — Medication 10 ML: at 14:35

## 2023-03-23 RX ADMIN — PROPOFOL 150 MCG/KG/MIN: 10 INJECTION, EMULSION INTRAVENOUS at 15:40

## 2023-03-23 RX ADMIN — LIDOCAINE HYDROCHLORIDE 50 MG: 10 INJECTION, SOLUTION EPIDURAL; INFILTRATION; INTRACAUDAL; PERINEURAL at 15:40

## 2023-03-23 NOTE — ANESTHESIA PREPROCEDURE EVALUATION
Anesthesia Evaluation     Patient summary reviewed and Nursing notes reviewed   NPO Solid Status: > 8 hours  NPO Liquid Status: > 2 hours           Airway   Mallampati: II  TM distance: >3 FB  Neck ROM: full  No difficulty expected  Dental      Pulmonary    (+) asthma (prn MDI none recently ),sleep apnea (w/u pending ),   (-) shortness of breath, recent URI, not a smoker  Cardiovascular     (+) hypertension,   (-) past MI, dysrhythmias (w/u in past no Rx), angina, cardiac stents      Neuro/Psych  (+) headaches, psychiatric history,    GI/Hepatic/Renal/Endo    (+) obesity, morbid obesity, GERD,  renal disease (one kidney), thyroid problem hypothyroidism    Musculoskeletal     Abdominal    Substance History      OB/GYN negative ob/gyn ROS   (-)  Pregnant, Preeclampsia and history of pregnancy induced hypertension        Other                        Anesthesia Plan    ASA 3     general and MAC     (PFL)  intravenous induction     Anesthetic plan, risks, benefits, and alternatives have been provided, discussed and informed consent has been obtained with: patient.    Plan discussed with CRNA.        CODE STATUS:

## 2023-03-23 NOTE — ANESTHESIA POSTPROCEDURE EVALUATION
Patient: Lilly Segovia    Procedure Summary     Date: 03/23/23 Room / Location:  [facility] /  [facility]    Anesthesia Start: 1538 Anesthesia Stop: 1607    Procedure: COLONOSCOPY WITH POSSIBLE POLYPECTOMY, BIOPSY, AND CONTROL OF GI BLEEDING Diagnosis:       Bloody stool      BMI 50.0-59.9, adult (HCC)      (Bloody stool [K92.1])      (BMI 50.0-59.9, adult (HCC) [Z68.43])    Surgeons: Eder Fragoso MD Provider: Elmer García MD    Anesthesia Type: general, MAC ASA Status: 3          Anesthesia Type: general, MAC    Vitals  No vitals data found for the desired time range.          Post Anesthesia Care and Evaluation    Patient location during evaluation: PACU  Patient participation: complete - patient participated  Level of consciousness: awake  Pain score: 0  Pain management: adequate    Airway patency: patent  Anesthetic complications: No anesthetic complications  PONV Status: none  Cardiovascular status: acceptable and stable  Respiratory status: nasal cannula, unassisted, acceptable and spontaneous ventilation  Hydration status: acceptable

## 2023-03-23 NOTE — H&P
"GASTROENTEROLOGY OFFICE NOTE  Lilly Segovia  [id number]  1995      CHIEF COMPLAINT   Hematochezia  Family history suspicious for colon cancer    HISTORY OF PRESENT ILLNESS:  Very pleasant 27-year-old white female who I am seeing for the first time today who describes about once a month she will notice some bright red blood rectally but more concerning is through \"23 and Me\" testing there has been the identification of a family member apparently with colon cancer.  Potentially this could be her father.  She does not know her family history on her father side.  This is of concern to her.    From a GI standpoint she is otherwise doing well without dysphagia, odynophagia, early satiety, unexplained weight loss, melena, constipation or diarrhea.    PAST MEDICAL HISTORY  Past Medical History:   • Acid reflux   • ADHD (attention deficit hyperactivity disorder)   • Allergic   • Anemia   • Asthma   • COVID   • Disease of thyroid gland   • Dysthymic disorder    Formatting of this note might be different from the original.  Saw counselors and took medications as a teenager. Was in a mental hospital at one point. Unsure what the diagnoses were. States all medications (lexapro, paxil, prozac) made her feel like a zombie.  In early 2020, tried to take citalopram liquid for 1 week, had side effects (felt drugged up). Stopped medication. Feels like anxiety com   • Hypothyroidism   • Kidney agenesis    Formatting of this note might be different from the original.  Went to ER in April 2021 for pain, had CT scan which showed lack of a kidney.   • Migraine   • Preeclampsia   • PTSD (post-traumatic stress disorder)    Formatting of this note might be different from the original.  Prior panic attacks.  Went on trip last month and had some anxiety over it.  Worried about a possible COVID exposure but, did not come down with it.   Feels like her anxiety is getting worse.  She is not working, she's at home all day with her daughter " and the dog.  She feels like her anxiety is turning into rage, like she needs to yel   • Varicella        PAST SURGICAL HISTORY  Past Surgical History:   • WISDOM TOOTH EXTRACTION        MEDICATIONS:    Current Facility-Administered Medications:   •  lactated ringers infusion, 9 mL/hr, Intravenous, Continuous, Yonatan Storey MD, Last Rate: 9 mL/hr at 03/23/23 1435, 9 mL/hr at 03/23/23 1435  •  sodium chloride 0.9 % flush 10 mL, 10 mL, Intravenous, Q12H, Yonatan Storey MD  •  sodium chloride 0.9 % flush 10 mL, 10 mL, Intravenous, PRN, Yonatan Storey MD, 10 mL at 03/23/23 1435  •  sodium chloride 0.9 % infusion 40 mL, 40 mL, Intravenous, PRN, Yonatan Storey MD    ALLERGIES  is allergic to cetirizine.    FAMILY HISTORY:  Cancer-related family history is not on file.  Colon Cancer-related family history is not on file.    SOCIAL HISTORY  She  reports that she quit smoking about 9 years ago. Her smoking use included cigarettes. She has a 2.50 pack-year smoking history. She has never used smokeless tobacco. She reports that she does not currently use alcohol after a past usage of about 1.0 - 3.0 standard drink per week. She reports that she does not currently use drugs after having used the following drugs: Marijuana.   She is an author.  Single.  3-year-old daughter.  Quit smoking many years ago and rarely drinks alcoholic beverages.    REVIEW OF SYSTEMS  Cardiovascular, pulmonary, generalized review systems are pertinent as reviewed above    PHYSICAL EXAM   /97 (BP Location: Left arm, Patient Position: Sitting)   Pulse 81   Temp 97.4 °F (36.3 °C) (Temporal)   Resp 20   LMP 02/26/2023 Comment: neg HcG  SpO2 97%   General: Alert and oriented x 3. In no apparent or acute distress.  and No stigmata of chronic liver disease  HEENT: Anicteric sclerae. Normal oropharynx  Neck: Supple. Without lymphadenopathy  CV: Regular rate and rhythm, S1, S2  Lungs: Clear to ausculation. Without rales, rhonchi and  wheezing  Abdomen:  Soft,non-distended without palpable masses or hepatosplenomeagaly, areas of rebound tenderness or guarding.   Extremeties: without clubbing, cyanosis or edema  Neurologic:  Alert and oriented x 3 without focal motor or sensory deficits  Rectal exam: deferred         ASSESSMENT  1.-  Hematochezia rule out colonic neoplasia inflammatory bowel disease and fissure internal hemorrhoids  2.-  Questionable family history of colon cancer.  In the absence of the ability to clarify this further colonoscopy particularly given her hematochezia is indicated and perhaps more importantly referral subsequently to genetic counselor for further clarification of any potential genetic predisposition to malignancy    PLAN  1.-  Colonoscopy today  2.-  Referral for genetic counseling  3.-  Further recommendation deferred pending findings of above-noted studies.    Eder Fragoso MD  3/23/2023   15:03 EDT    Addendum  Colonoscopy is unremarkable except for very small posterior anal fissure.  Referral will be made to genetic counselor to further define her genetic risk for colon cancer and more importantly determine timing for repeat colonoscopy.  Without further date I would recommend repeating colonoscopy in 3-5 years at this time

## 2023-04-12 DIAGNOSIS — F41.0 PANIC DISORDER (EPISODIC PAROXYSMAL ANXIETY): Chronic | ICD-10-CM

## 2023-04-12 DIAGNOSIS — F41.1 GENERALIZED ANXIETY DISORDER: Chronic | ICD-10-CM

## 2023-04-12 DIAGNOSIS — F43.10 POST TRAUMATIC STRESS DISORDER (PTSD): Chronic | ICD-10-CM

## 2023-04-12 DIAGNOSIS — F33.1 MODERATE EPISODE OF RECURRENT MAJOR DEPRESSIVE DISORDER: Chronic | ICD-10-CM

## 2023-04-12 RX ORDER — ESCITALOPRAM OXALATE 10 MG/1
TABLET ORAL
Qty: 45 TABLET | Refills: 0 | OUTPATIENT
Start: 2023-04-12

## 2023-06-13 DIAGNOSIS — K21.9 GASTROESOPHAGEAL REFLUX DISEASE, UNSPECIFIED WHETHER ESOPHAGITIS PRESENT: ICD-10-CM

## 2023-06-13 RX ORDER — OMEPRAZOLE 40 MG/1
40 CAPSULE, DELAYED RELEASE ORAL DAILY
Qty: 30 CAPSULE | Refills: 0 | Status: SHIPPED | OUTPATIENT
Start: 2023-06-13

## 2023-06-13 NOTE — TELEPHONE ENCOUNTER
Rx Refill Note  Requested Prescriptions     Pending Prescriptions Disp Refills    omeprazole (priLOSEC) 40 MG capsule 30 capsule 0     Sig: Take 1 capsule by mouth Daily.      Last office visit with prescribing clinician: 1/20/2023     Next office visit with prescribing clinician: 6/19/2023       Yvette Cortez MA  06/13/23, 17:10 EDT

## 2023-06-13 NOTE — TELEPHONE ENCOUNTER
Caller: Lilly Segovia    Relationship: Self    Best call back number: [phone]    Requested Prescriptions:   Requested Prescriptions     Pending Prescriptions Disp Refills    omeprazole (priLOSEC) 40 MG capsule 90 capsule 1     Sig: Take 1 capsule by mouth Daily.        Pharmacy where request should be sent:      Last office visit with prescribing clinician: 1/20/2023   Last telemedicine visit with prescribing clinician: Visit date not found   Next office visit with prescribing clinician: 6/19/2023     Additional details provided by patient: PATIENT IS OUT OF MEDICATION AND APPOINTMENT NOT UNTIL 06/19/2023    Does the patient have less than a 3 day supply:  [x] Yes  [] No    Would you like a call back once the refill request has been completed: [x] Yes [] No    If the office needs to give you a call back, can they leave a voicemail: [x] Yes [] No    Joyce Rodríguez Rep   06/13/23 16:12 EDT

## 2023-06-19 ENCOUNTER — LAB (OUTPATIENT)
Dept: INTERNAL MEDICINE | Facility: CLINIC | Age: 28
End: 2023-06-19
Payer: COMMERCIAL

## 2023-06-19 ENCOUNTER — TELEPHONE (OUTPATIENT)
Dept: INTERNAL MEDICINE | Facility: CLINIC | Age: 28
End: 2023-06-19

## 2023-06-19 DIAGNOSIS — Z11.3 SCREEN FOR STD (SEXUALLY TRANSMITTED DISEASE): ICD-10-CM

## 2023-06-19 DIAGNOSIS — J45.20 MILD INTERMITTENT ASTHMA WITHOUT COMPLICATION: ICD-10-CM

## 2023-06-19 DIAGNOSIS — E66.01 MORBID (SEVERE) OBESITY DUE TO EXCESS CALORIES: ICD-10-CM

## 2023-06-19 DIAGNOSIS — Z00.00 ANNUAL PHYSICAL EXAM: ICD-10-CM

## 2023-06-19 PROCEDURE — 36415 COLL VENOUS BLD VENIPUNCTURE: CPT | Performed by: NURSE PRACTITIONER

## 2023-06-19 NOTE — TELEPHONE ENCOUNTER
Caller: Lilly Segovia    Relationship: Self    Best call back number:[phone]      What medication are you requesting: WEGOVY OR SAXENDA OR CONTRAVE    What are your current symptoms:  WEIGHT ISSUES    How long have you been experiencing symptoms: LIFE    Have you had these symptoms before:    [x] Yes  [] No    Have you been treated for these symptoms before:   [] Yes  [x] No    If a prescription is needed, what is your preferred pharmacy and phone number:  [facility]    Additional notes: PATIENT CALLED HER INSURANCE AND THEY STATED THAT THE PROVIDER WOULD NEED TO CALL TO GET THIS APPROVED.  CALL [employer] [facility]K   FOR A PRIOR AUTH.

## 2023-06-20 LAB
ALBUMIN SERPL-MCNC: 4.4 G/DL (ref 3.5–5.2)
ALBUMIN/GLOB SERPL: 1.7 G/DL
ALP SERPL-CCNC: 70 U/L (ref 39–117)
ALT SERPL W P-5'-P-CCNC: 23 U/L (ref 1–33)
ANION GAP SERPL CALCULATED.3IONS-SCNC: 13.1 MMOL/L (ref 5–15)
AST SERPL-CCNC: 19 U/L (ref 1–32)
BILIRUB SERPL-MCNC: <0.2 MG/DL (ref 0–1.2)
BUN SERPL-MCNC: 11 MG/DL (ref 6–20)
BUN/CREAT SERPL: 17.2 (ref 7–25)
CALCIUM SPEC-SCNC: 9.8 MG/DL (ref 8.6–10.5)
CHLORIDE SERPL-SCNC: 107 MMOL/L (ref 98–107)
CHOLEST SERPL-MCNC: 198 MG/DL (ref 0–200)
CO2 SERPL-SCNC: 20.9 MMOL/L (ref 22–29)
CREAT SERPL-MCNC: 0.64 MG/DL (ref 0.57–1)
DEPRECATED RDW RBC AUTO: 42.3 FL (ref 37–54)
EGFRCR SERPLBLD CKD-EPI 2021: 122.9 ML/MIN/1.73
ERYTHROCYTE [DISTWIDTH] IN BLOOD BY AUTOMATED COUNT: 13.7 % (ref 12.3–15.4)
GLOBULIN UR ELPH-MCNC: 2.6 GM/DL
GLUCOSE SERPL-MCNC: 85 MG/DL (ref 65–99)
HAV IGM SERPL QL IA: NORMAL
HBA1C MFR BLD: 5.6 % (ref 4.8–5.6)
HBV CORE IGM SERPL QL IA: NORMAL
HBV SURFACE AG SERPL QL IA: NORMAL
HCT VFR BLD AUTO: 37 % (ref 34–46.6)
HCV AB SER DONR QL: NORMAL
HDLC SERPL-MCNC: 34 MG/DL (ref 40–60)
HGB BLD-MCNC: 12.4 G/DL (ref 12–15.9)
HIV1+2 AB SER QL: NORMAL
LDLC SERPL CALC-MCNC: 146 MG/DL (ref 0–100)
LDLC/HDLC SERPL: 4.26 {RATIO}
MCH RBC QN AUTO: 28.1 PG (ref 26.6–33)
MCHC RBC AUTO-ENTMCNC: 33.5 G/DL (ref 31.5–35.7)
MCV RBC AUTO: 83.9 FL (ref 79–97)
PLATELET # BLD AUTO: 246 10*3/MM3 (ref 140–450)
PMV BLD AUTO: 10.3 FL (ref 6–12)
POTASSIUM SERPL-SCNC: 4.4 MMOL/L (ref 3.5–5.2)
PROT SERPL-MCNC: 7 G/DL (ref 6–8.5)
RBC # BLD AUTO: 4.41 10*6/MM3 (ref 3.77–5.28)
RPR SER QL: NORMAL
SODIUM SERPL-SCNC: 141 MMOL/L (ref 136–145)
T4 FREE SERPL-MCNC: 0.81 NG/DL (ref 0.93–1.7)
TRIGL SERPL-MCNC: 96 MG/DL (ref 0–150)
TSH SERPL DL<=0.05 MIU/L-ACNC: 5.49 UIU/ML (ref 0.27–4.2)
VLDLC SERPL-MCNC: 18 MG/DL (ref 5–40)
WBC NRBC COR # BLD: 8.65 10*3/MM3 (ref 3.4–10.8)

## 2023-06-20 NOTE — TELEPHONE ENCOUNTER
Pt notified that she should call insurance company and get clarification and that something may possibly be prescribed at her follow up if not a plan exclusion.

## 2023-06-21 LAB
HSV1 IGG SER IA-ACNC: <0.91 INDEX (ref 0–0.9)
HSV2 IGG SER IA-ACNC: <0.91 INDEX (ref 0–0.9)

## 2023-07-21 DIAGNOSIS — K21.9 GASTROESOPHAGEAL REFLUX DISEASE, UNSPECIFIED WHETHER ESOPHAGITIS PRESENT: ICD-10-CM

## 2023-07-25 RX ORDER — OMEPRAZOLE 40 MG/1
CAPSULE, DELAYED RELEASE ORAL
Qty: 30 CAPSULE | Refills: 5 | Status: SHIPPED | OUTPATIENT
Start: 2023-07-25

## 2023-07-25 NOTE — TELEPHONE ENCOUNTER
Rx Refill Note  Requested Prescriptions     Pending Prescriptions Disp Refills    omeprazole (priLOSEC) 40 MG capsule [Pharmacy Med Name: OMEPRAZOLE 40 MG CPDR 40 Capsule] 30 capsule 5     Sig: TAKE 1 CAPSULE BY MOUTH ONCE DAILY      Last office visit with prescribing clinician: 6/19/2023     Next office visit with prescribing clinician: 6/21/2024       Yvette Cortez MA  07/25/23, 16:26 EDT

## 2023-08-07 DIAGNOSIS — F41.0 PANIC DISORDER (EPISODIC PAROXYSMAL ANXIETY): Chronic | ICD-10-CM

## 2023-08-07 DIAGNOSIS — F43.10 POST TRAUMATIC STRESS DISORDER (PTSD): Chronic | ICD-10-CM

## 2023-08-07 DIAGNOSIS — F33.1 MODERATE EPISODE OF RECURRENT MAJOR DEPRESSIVE DISORDER: Chronic | ICD-10-CM

## 2023-08-07 DIAGNOSIS — F41.1 GENERALIZED ANXIETY DISORDER: Chronic | ICD-10-CM

## 2023-08-07 RX ORDER — ESCITALOPRAM OXALATE 10 MG/1
TABLET ORAL
Qty: 45 TABLET | Refills: 5 | Status: SHIPPED | OUTPATIENT
Start: 2023-08-07

## 2023-08-30 ENCOUNTER — OFFICE VISIT (OUTPATIENT)
Dept: INTERNAL MEDICINE | Facility: CLINIC | Age: 28
End: 2023-08-30
Payer: COMMERCIAL

## 2023-08-30 ENCOUNTER — LAB (OUTPATIENT)
Dept: INTERNAL MEDICINE | Facility: CLINIC | Age: 28
End: 2023-08-30
Payer: COMMERCIAL

## 2023-08-30 VITALS
HEART RATE: 87 BPM | RESPIRATION RATE: 18 BRPM | DIASTOLIC BLOOD PRESSURE: 78 MMHG | OXYGEN SATURATION: 98 % | SYSTOLIC BLOOD PRESSURE: 132 MMHG | TEMPERATURE: 97.7 F

## 2023-08-30 DIAGNOSIS — J02.9 SORE THROAT: Primary | ICD-10-CM

## 2023-08-30 DIAGNOSIS — R53.83 OTHER FATIGUE: ICD-10-CM

## 2023-08-30 DIAGNOSIS — E03.9 ACQUIRED HYPOTHYROIDISM: ICD-10-CM

## 2023-08-30 LAB
BASOPHILS # BLD AUTO: 0.05 10*3/MM3 (ref 0–0.2)
BASOPHILS NFR BLD AUTO: 0.5 % (ref 0–1.5)
DEPRECATED RDW RBC AUTO: 41.8 FL (ref 37–54)
EOSINOPHIL # BLD AUTO: 0.05 10*3/MM3 (ref 0–0.4)
EOSINOPHIL NFR BLD AUTO: 0.5 % (ref 0.3–6.2)
ERYTHROCYTE [DISTWIDTH] IN BLOOD BY AUTOMATED COUNT: 13.7 % (ref 12.3–15.4)
EXPIRATION DATE: NORMAL
HCT VFR BLD AUTO: 40.6 % (ref 34–46.6)
HETEROPH AB SER QL LA: NEGATIVE
HGB BLD-MCNC: 13.7 G/DL (ref 12–15.9)
IMM GRANULOCYTES # BLD AUTO: 0.06 10*3/MM3 (ref 0–0.05)
IMM GRANULOCYTES NFR BLD AUTO: 0.6 % (ref 0–0.5)
INTERNAL CONTROL: NORMAL
LYMPHOCYTES # BLD AUTO: 2.51 10*3/MM3 (ref 0.7–3.1)
LYMPHOCYTES NFR BLD AUTO: 24.1 % (ref 19.6–45.3)
Lab: NORMAL
MCH RBC QN AUTO: 28.2 PG (ref 26.6–33)
MCHC RBC AUTO-ENTMCNC: 33.7 G/DL (ref 31.5–35.7)
MCV RBC AUTO: 83.5 FL (ref 79–97)
MONOCYTES # BLD AUTO: 0.71 10*3/MM3 (ref 0.1–0.9)
MONOCYTES NFR BLD AUTO: 6.8 % (ref 5–12)
NEUTROPHILS NFR BLD AUTO: 67.5 % (ref 42.7–76)
NEUTROPHILS NFR BLD AUTO: 7.03 10*3/MM3 (ref 1.7–7)
NRBC BLD AUTO-RTO: 0 /100 WBC (ref 0–0.2)
PLATELET # BLD AUTO: 284 10*3/MM3 (ref 140–450)
PMV BLD AUTO: 10 FL (ref 6–12)
RBC # BLD AUTO: 4.86 10*6/MM3 (ref 3.77–5.28)
WBC NRBC COR # BLD: 10.41 10*3/MM3 (ref 3.4–10.8)

## 2023-08-30 PROCEDURE — 80053 COMPREHEN METABOLIC PANEL: CPT | Performed by: NURSE PRACTITIONER

## 2023-08-30 PROCEDURE — 1160F RVW MEDS BY RX/DR IN RCRD: CPT | Performed by: FAMILY MEDICINE

## 2023-08-30 PROCEDURE — 86308 HETEROPHILE ANTIBODY SCREEN: CPT | Performed by: FAMILY MEDICINE

## 2023-08-30 PROCEDURE — 85025 COMPLETE CBC W/AUTO DIFF WBC: CPT | Performed by: FAMILY MEDICINE

## 2023-08-30 PROCEDURE — 36415 COLL VENOUS BLD VENIPUNCTURE: CPT | Performed by: FAMILY MEDICINE

## 2023-08-30 PROCEDURE — 84443 ASSAY THYROID STIM HORMONE: CPT | Performed by: NURSE PRACTITIONER

## 2023-08-30 PROCEDURE — 1159F MED LIST DOCD IN RCRD: CPT | Performed by: FAMILY MEDICINE

## 2023-08-30 PROCEDURE — 99213 OFFICE O/P EST LOW 20 MIN: CPT | Performed by: FAMILY MEDICINE

## 2023-08-30 RX ORDER — PREDNISONE 20 MG/1
20 TABLET ORAL DAILY
Qty: 10 TABLET | Refills: 0 | Status: SHIPPED | OUTPATIENT
Start: 2023-08-30

## 2023-08-30 RX ORDER — LIDOCAINE HYDROCHLORIDE 20 MG/ML
SOLUTION OROPHARYNGEAL
COMMUNITY
Start: 2023-08-29

## 2023-08-30 RX ORDER — AZITHROMYCIN 250 MG/1
250 TABLET, FILM COATED ORAL DAILY
COMMUNITY
Start: 2023-08-29

## 2023-08-30 NOTE — PROGRESS NOTES
"    Office Note     Name: Lilly Segovia    : 1995     MRN: [id number]     Chief Complaint  Sore Throat (Swallowing glass feeling, ongoing for several weeks, no energy, saw [facility] yesterday was given abx)    Subjective     History of Present Illness:  Lilly Segovia is a 27 y.o. female who presents today for several concerns.  She states she has been seen twice already for a sore throat in the last 2 to 3 weeks at urgent care and she is here today for sore throat and fatigue and its been going on for about 2 to 3 weeks and those are the main symptoms and she has been treated with cefdinir and then Zithromax or erythromycin she is not sure and then she was given 2 to 3 days of prednisone and that seemed to help and no other symptoms.  She has not been around my sick and she has no fever or vomiting.    Review of Systems   Respiratory:  Negative for cough, shortness of breath and wheezing.    Cardiovascular:  Negative for chest pain and palpitations.   Gastrointestinal:  Negative for blood in stool, diarrhea and vomiting.   Genitourinary:  Negative for dysuria, flank pain and genital sores.     Objective     Vital Signs  /78   Pulse 87   Temp 97.7 øF (36.5 øC) (Infrared)   Resp 18   SpO2 98%   Estimated body mass index is 57.19 kg/mý as calculated from the following:    Height as of 23: 162.6 cm (64\").    Weight as of 23: 151 kg (333 lb 3.2 oz).            Physical Exam  Vitals and nursing note reviewed.   HENT:      Head: Normocephalic and atraumatic.      Jaw: There is normal jaw occlusion.      Salivary Glands: Right salivary gland is not diffusely enlarged or tender. Left salivary gland is not diffusely enlarged or tender.      Right Ear: Hearing, tympanic membrane, ear canal and external ear normal.      Left Ear: Hearing, tympanic membrane, ear canal and external ear normal.      Nose: Nose normal.      Mouth/Throat:      Lips: Pink.      Mouth: Mucous membranes are moist.      Tongue: " No lesions.      Palate: No lesions.      Pharynx: Oropharynx is clear.   Neck:      Vascular: No carotid bruit.   Cardiovascular:      Rate and Rhythm: Normal rate and regular rhythm.      Heart sounds: Normal heart sounds. No murmur heard.    No gallop.   Pulmonary:      Effort: Pulmonary effort is normal. No respiratory distress.      Breath sounds: No stridor. No wheezing, rhonchi or rales.   Musculoskeletal:      Cervical back: Normal range of motion and neck supple.      Right lower leg: No edema.      Left lower leg: No edema.   Lymphadenopathy:      Cervical: No cervical adenopathy.   Neurological:      Mental Status: She is alert.               Assessment and Plan     Diagnoses and all orders for this visit:    1. Sore throat (Primary)  -     POCT Infectious mononucleosis antibody  -     Mononucleosis Test, Qual With Reflex; Future  -     Mononucleosis Test, Qual With Reflex  -     predniSONE (DELTASONE) 20 MG tablet; Take 1 tablet by mouth Daily.  Dispense: 10 tablet; Refill: 0    2. Other fatigue  -     CBC Auto Differential  -     Comprehensive Metabolic Panel; Future  -     Comprehensive Metabolic Panel    3. Acquired hypothyroidism  -     TSH Rfx On Abnormal To Free T4    With no improvement---she may need to see ENT and this is discussed with her      Follow Up  Prn with COREY Lombardi DO

## 2023-08-31 LAB
ALBUMIN SERPL-MCNC: 4.4 G/DL (ref 3.5–5.2)
ALBUMIN/GLOB SERPL: 1.3 G/DL
ALP SERPL-CCNC: 79 U/L (ref 39–117)
ALT SERPL W P-5'-P-CCNC: 72 U/L (ref 1–33)
ANION GAP SERPL CALCULATED.3IONS-SCNC: 12.8 MMOL/L (ref 5–15)
AST SERPL-CCNC: 33 U/L (ref 1–32)
BILIRUB SERPL-MCNC: 0.3 MG/DL (ref 0–1.2)
BUN SERPL-MCNC: 9 MG/DL (ref 6–20)
BUN/CREAT SERPL: 12 (ref 7–25)
CALCIUM SPEC-SCNC: 9.8 MG/DL (ref 8.6–10.5)
CHLORIDE SERPL-SCNC: 101 MMOL/L (ref 98–107)
CO2 SERPL-SCNC: 22.2 MMOL/L (ref 22–29)
CREAT SERPL-MCNC: 0.75 MG/DL (ref 0.57–1)
EGFRCR SERPLBLD CKD-EPI 2021: 110.7 ML/MIN/1.73
GLOBULIN UR ELPH-MCNC: 3.5 GM/DL
GLUCOSE SERPL-MCNC: 109 MG/DL (ref 65–99)
POTASSIUM SERPL-SCNC: 4.2 MMOL/L (ref 3.5–5.2)
PROT SERPL-MCNC: 7.9 G/DL (ref 6–8.5)
SODIUM SERPL-SCNC: 136 MMOL/L (ref 136–145)
TSH SERPL DL<=0.05 MIU/L-ACNC: 2.79 UIU/ML (ref 0.27–4.2)

## 2023-09-01 ENCOUNTER — TELEPHONE (OUTPATIENT)
Dept: INTERNAL MEDICINE | Facility: CLINIC | Age: 28
End: 2023-09-01
Payer: COMMERCIAL

## 2023-09-01 LAB — HETEROPH AB SER QL LA: NEGATIVE

## 2023-09-01 NOTE — TELEPHONE ENCOUNTER
Pt states her throat is not feeling any better. Can you please place a referral to ENT for her?     Pt informed and verbalized good understanding and appreciation.

## 2023-09-01 NOTE — TELEPHONE ENCOUNTER
----- Message from Kristy Lombardi DO sent at 9/1/2023  3:59 PM EDT -----  Mono is negative  One lft is up a bit  Please repeat lft's in 2 weeks  And is her throat feeling better?

## 2023-09-05 DIAGNOSIS — R07.0 THROAT PAIN: Primary | ICD-10-CM

## 2023-09-07 NOTE — TELEPHONE ENCOUNTER
Pt informed ENT referral has been placed per Dr. Lombardi. She verbalized good understanding and appreciation.

## 2023-09-18 ENCOUNTER — TELEPHONE (OUTPATIENT)
Dept: INTERNAL MEDICINE | Facility: CLINIC | Age: 28
End: 2023-09-18

## 2023-09-18 NOTE — TELEPHONE ENCOUNTER
2nd attempt to reach pt.     HUB: If patient returns call please relay providers message:      Your lab is in acceptable ranges/is stable. Please continue your current treatment plan and/or medications.   Written by EDGARDO Velázquez on 9/13/2023  7:34 AM EDT

## 2023-09-18 NOTE — TELEPHONE ENCOUNTER
Called and lvm for patient to return call, office number provided.      HUB: If patient returns call please relay providers message:     Your lab is in acceptable ranges/is stable. Please continue your current treatment plan and/or medications.   Written by EDGARDO Velázquez on 9/13/2023  7:34 AM XIAO Amin CMA

## 2023-09-27 DIAGNOSIS — E03.9 ACQUIRED HYPOTHYROIDISM: ICD-10-CM

## 2023-09-29 RX ORDER — LEVOTHYROXINE SODIUM 112 UG/1
TABLET ORAL
Qty: 90 TABLET | Refills: 0 | Status: SHIPPED | OUTPATIENT
Start: 2023-09-29

## 2024-02-05 DIAGNOSIS — E03.9 ACQUIRED HYPOTHYROIDISM: ICD-10-CM

## 2024-02-08 RX ORDER — LEVOTHYROXINE SODIUM 112 UG/1
TABLET ORAL
Qty: 90 TABLET | Refills: 1 | Status: SHIPPED | OUTPATIENT
Start: 2024-02-08

## 2024-03-12 DIAGNOSIS — K21.9 GASTROESOPHAGEAL REFLUX DISEASE, UNSPECIFIED WHETHER ESOPHAGITIS PRESENT: ICD-10-CM

## 2024-03-12 RX ORDER — OMEPRAZOLE 40 MG/1
CAPSULE, DELAYED RELEASE ORAL
Qty: 30 CAPSULE | Refills: 5 | Status: SHIPPED | OUTPATIENT
Start: 2024-03-12

## 2024-03-12 NOTE — TELEPHONE ENCOUNTER
Rx Refill Note  Requested Prescriptions     Pending Prescriptions Disp Refills    omeprazole (priLOSEC) 40 MG capsule [Pharmacy Med Name: OMEPRAZOLE * 40 MG CPDR 40 Capsule] 30 capsule 5     Sig: TAKE 1 CAPSULE BY MOUTH ONCE DAILY      Last office visit with prescribing clinician: 6/19/2023   Last telemedicine visit with prescribing clinician: Visit date not found   Next office visit with prescribing clinician: 6/21/2024     Joanne Maciel MA  03/12/24, 14:48 EDT

## 2024-04-01 ENCOUNTER — TELEPHONE (OUTPATIENT)
Dept: INTERNAL MEDICINE | Facility: CLINIC | Age: 29
End: 2024-04-01
Payer: COMMERCIAL

## 2024-04-01 NOTE — TELEPHONE ENCOUNTER
Caller: Lilly Segovia    Relationship: Self    Best call back number: [phone]     What is the medical concern/diagnosis: SLEEP APNEA    What specialty or service is being requested: SLEEP MEDICINE    What is the provider, practice or medical service name:  [facility] DR [facility]    What is the office location: [address] [facility] [address]    Any additional details: PATIENT NEEDS THE REFERRAL UPDATED. IT'S

## 2024-04-01 NOTE — TELEPHONE ENCOUNTER
Caller: Lilly Segovia    Relationship: Self    Best call back number: [phone]     What is the medical concern/diagnosis: GASTRIC BYPASS    What specialty or service is being requested: WEIGHT LOSS    What is the provider, practice or medical service name: DR [facility]    What is the office location: [address] [place] [address] [address]

## 2024-04-02 NOTE — TELEPHONE ENCOUNTER
HUB to relay:    If patient calls back please schedule her an appointment for an update on both of her referrals.

## 2024-04-09 ENCOUNTER — OFFICE VISIT (OUTPATIENT)
Dept: INTERNAL MEDICINE | Facility: CLINIC | Age: 29
End: 2024-04-09
Payer: COMMERCIAL

## 2024-04-09 VITALS
RESPIRATION RATE: 18 BRPM | WEIGHT: 293 LBS | TEMPERATURE: 97.3 F | OXYGEN SATURATION: 97 % | HEART RATE: 82 BPM | HEIGHT: 64 IN | DIASTOLIC BLOOD PRESSURE: 76 MMHG | BODY MASS INDEX: 50.02 KG/M2 | SYSTOLIC BLOOD PRESSURE: 126 MMHG

## 2024-04-09 DIAGNOSIS — F33.1 MODERATE EPISODE OF RECURRENT MAJOR DEPRESSIVE DISORDER: Chronic | ICD-10-CM

## 2024-04-09 DIAGNOSIS — Z91.89 AT RISK FOR SLEEP APNEA: ICD-10-CM

## 2024-04-09 DIAGNOSIS — F41.0 PANIC DISORDER (EPISODIC PAROXYSMAL ANXIETY): Chronic | ICD-10-CM

## 2024-04-09 DIAGNOSIS — F43.10 POST TRAUMATIC STRESS DISORDER (PTSD): Chronic | ICD-10-CM

## 2024-04-09 DIAGNOSIS — E66.01 OBESITY, MORBID, BMI 50 OR HIGHER: Primary | ICD-10-CM

## 2024-04-09 DIAGNOSIS — N94.0 MITTELSCHMERZ: ICD-10-CM

## 2024-04-09 DIAGNOSIS — F41.1 GENERALIZED ANXIETY DISORDER: Chronic | ICD-10-CM

## 2024-04-09 DIAGNOSIS — G43.009 MIGRAINE WITHOUT AURA AND WITHOUT STATUS MIGRAINOSUS, NOT INTRACTABLE: ICD-10-CM

## 2024-04-09 RX ORDER — ESCITALOPRAM OXALATE 10 MG/1
15 TABLET ORAL DAILY
Qty: 45 TABLET | Refills: 2 | Status: SHIPPED | OUTPATIENT
Start: 2024-04-09

## 2024-04-09 RX ORDER — RIZATRIPTAN BENZOATE 5 MG/1
5 TABLET, ORALLY DISINTEGRATING ORAL ONCE AS NEEDED
Qty: 9 TABLET | Refills: 2 | Status: SHIPPED | OUTPATIENT
Start: 2024-04-09

## 2024-04-09 RX ORDER — QUETIAPINE FUMARATE 25 MG/1
12.5-25 TABLET, FILM COATED ORAL 2 TIMES DAILY PRN
Qty: 60 TABLET | Refills: 1 | Status: SHIPPED | OUTPATIENT
Start: 2024-04-09

## 2024-04-09 RX ORDER — NAPROXEN 500 MG/1
500 TABLET ORAL 2 TIMES DAILY PRN
Qty: 10 TABLET | Refills: 2 | Status: SHIPPED | OUTPATIENT
Start: 2024-04-09

## 2024-04-09 NOTE — PROGRESS NOTES
Subjective   Lilly Segovia is a 28 y.o. female.     Chief Complaint   Patient presents with    Anxiety    Obesity     Pt would like a referral to Bariatric surgery Dr. Vides office     referral     Pt would like another referral to Sleep medicine        PCP: Dior Bach APRN    The patient is a 28-year-old female who presents to the clinic for follow-up.     Patient is requesting a referral for bariatric surgery with Dr. Vides for her obesity as her BMI is 57.1 today and a referral for sleep medicine. She was previously referred for both of these, but referrals are outdated and needs to be updated.    The patient did not attend the previously referred sleep specialist due to anxiety about the necessity of wearing a mask. Her sleep has been disrupted due to severe snoring, waking up gasping for air, and choking on saliva. A few nights ago, she woke up choking on her saliva. She occasionally consumes alcohol, which she believes improves her sleep quality. She predominantly sleeps on her stomach and falls asleep on her right side.    She has been dealing with a lot of anxiety due to her weight. Patient feels embarrassed and states that she does not want to see herself in the mirror because of the way she looks. She saw behavioral health through telehealth and states that she prefers in-person consultations instead.     Patient started noticing head shaking 2 months ago whenever she is focused on activities such as watching TV or video games. She has been without Seroquel for an extended period.    The patient experiences severe right flank pain monthly during ovulation, characterized by sharp, stabbing pain for the entire day. Despite attempts to manage the pain with ibuprofen 600 mg, Tylenol, and heat pads, the pain persists. She is requesting for a stronger medication to manage the pain. She is unable to take naproxen due to having one kidney.    The patient has been experiencing severe migraines, with more  than 15 migraines per month without aura. Tylenol is ineffective in managing these migraines. She was previously prescribed with an unrecalled nasal spray, but discontinued due to vomiting. She has tried sumatriptan in the past. She developed a dependency on caffeine due to her migraines. Patient stopped drinking soda 2 to 3 weeks ago, but her migraines recurred. The patient has a history of migraines prior to her pregnancy.      The following portions of the patient's history were reviewed and updated as appropriate: allergies, current medications, past family history, past medical history, past social history, past surgical history and problem list.        Review of Systems   Constitutional:  Positive for unexpected weight gain. Negative for chills, diaphoresis, fatigue, fever and unexpected weight loss.   Eyes:  Negative for blurred vision, double vision and visual disturbance.   Respiratory:  Negative for cough, shortness of breath and wheezing.    Cardiovascular:  Negative for chest pain, palpitations and leg swelling.   Gastrointestinal:  Negative for abdominal pain, constipation, diarrhea, nausea and vomiting.   Genitourinary:  Negative for difficulty urinating, frequency and urgency.   Musculoskeletal:  Negative for arthralgias and myalgias.   Skin:  Negative for color change and rash.   Neurological:  Positive for headache. Negative for dizziness and weakness.   Hematological:  Negative for adenopathy. Does not bruise/bleed easily.           Outpatient Medications Marked as Taking for the 4/9/24 encounter (Office Visit) with Dior Bach APRN   Medication Sig Dispense Refill    albuterol sulfate  (90 Base) MCG/ACT inhaler Inhale 2 puffs Every 4 (Four) Hours As Needed for Wheezing for up to 30 days. 18 g 0    escitalopram (LEXAPRO) 10 MG tablet Take 1.5 tablets by mouth Daily. 45 tablet 2    fluticasone (Flonase) 50 MCG/ACT nasal spray 2 sprays into the nostril(s) as directed by provider Daily.  16 g 11    levothyroxine (SYNTHROID, LEVOTHROID) 112 MCG tablet TAKE 1 TABLET BY MOUTH ONCE DAILY 90 tablet 1    omeprazole (priLOSEC) 40 MG capsule TAKE 1 CAPSULE BY MOUTH ONCE DAILY 30 capsule 5    QUEtiapine (SEROquel) 25 MG tablet Take 0.5-1 tablets by mouth 2 (Two) Times a Day As Needed (anxiety/panic). 60 tablet 1    [DISCONTINUED] escitalopram (LEXAPRO) 10 MG tablet TAKE 1 AND 1/2 TABLETS BY MOUTH ONCE DAILY 45 tablet 5     Allergies   Allergen Reactions    Cetirizine Anaphylaxis and Unknown - High Severity           Objective   Physical Exam  Constitutional:       Appearance: Normal appearance. She is well-developed. She is morbidly obese.   HENT:      Head: Normocephalic and atraumatic.   Eyes:      General: No scleral icterus.     Conjunctiva/sclera: Conjunctivae normal.   Cardiovascular:      Rate and Rhythm: Normal rate and regular rhythm.      Heart sounds: Normal heart sounds.   Pulmonary:      Effort: Pulmonary effort is normal. No respiratory distress.      Breath sounds: Normal breath sounds.   Abdominal:      General: Bowel sounds are normal.      Palpations: Abdomen is soft.      Tenderness: There is no abdominal tenderness.   Musculoskeletal:         General: Normal range of motion.      Cervical back: Normal range of motion.      Right lower leg: No edema.      Left lower leg: No edema.   Skin:     General: Skin is warm and dry.   Neurological:      General: No focal deficit present.      Mental Status: She is alert and oriented to person, place, and time.   Psychiatric:         Attention and Perception: Attention normal.         Mood and Affect: Mood and affect normal.         Behavior: Behavior normal. Behavior is cooperative.         Thought Content: Thought content normal.         Cognition and Memory: Cognition normal.         Judgment: Judgment normal.         Vitals:    04/09/24 1626   BP: 126/76   BP Location: Right arm   Patient Position: Sitting   Cuff Size: Large Adult   Pulse: 82  "  Resp: 18   Temp: 97.3 °F (36.3 °C)   TempSrc: Infrared   SpO2: 97%   Weight: (!) 151 kg (333 lb)   Height: 162.6 cm (64.02\")     Body mass index is 57.12 kg/m².  Wt Readings from Last 3 Encounters:   04/09/24 (!) 151 kg (333 lb)   03/12/24 (!) 150 kg (330 lb 11 oz)   02/07/24 (!) 150 kg (330 lb)             Assessment & Plan   Diagnoses and all orders for this visit:    1. Obesity, morbid, BMI 50 or higher (Primary)  -     Ambulatory Referral to Bariatric Surgery    2. At risk for sleep apnea  -     Ambulatory Referral to Sleep Medicine    3. Generalized anxiety disorder  -     QUEtiapine (SEROquel) 25 MG tablet; Take 0.5-1 tablets by mouth 2 (Two) Times a Day As Needed (anxiety/panic).  Dispense: 60 tablet; Refill: 1  -     escitalopram (LEXAPRO) 10 MG tablet; Take 1.5 tablets by mouth Daily.  Dispense: 45 tablet; Refill: 2  -     Ambulatory Referral to Behavioral Health    4. Post traumatic stress disorder (PTSD)  -     QUEtiapine (SEROquel) 25 MG tablet; Take 0.5-1 tablets by mouth 2 (Two) Times a Day As Needed (anxiety/panic).  Dispense: 60 tablet; Refill: 1  -     escitalopram (LEXAPRO) 10 MG tablet; Take 1.5 tablets by mouth Daily.  Dispense: 45 tablet; Refill: 2  -     Ambulatory Referral to Behavioral Health    5. Panic disorder (episodic paroxysmal anxiety)  -     QUEtiapine (SEROquel) 25 MG tablet; Take 0.5-1 tablets by mouth 2 (Two) Times a Day As Needed (anxiety/panic).  Dispense: 60 tablet; Refill: 1  -     escitalopram (LEXAPRO) 10 MG tablet; Take 1.5 tablets by mouth Daily.  Dispense: 45 tablet; Refill: 2  -     Ambulatory Referral to Behavioral Health    6. Moderate episode of recurrent major depressive disorder  -     QUEtiapine (SEROquel) 25 MG tablet; Take 0.5-1 tablets by mouth 2 (Two) Times a Day As Needed (anxiety/panic).  Dispense: 60 tablet; Refill: 1  -     escitalopram (LEXAPRO) 10 MG tablet; Take 1.5 tablets by mouth Daily.  Dispense: 45 tablet; Refill: 2  -     Ambulatory Referral to " Behavioral Health    7. Migraine without aura and without status migrainosus, not intractable  -     rizatriptan MLT (MAXALT-MLT) 5 MG disintegrating tablet; Place 1 tablet on the tongue 1 (One) Time As Needed for Migraine. May repeat in 2 hours if needed  Dispense: 9 tablet; Refill: 2    8. Mittelschmerz  -     naproxen (Naprosyn) 500 MG tablet; Take 1 tablet by mouth 2 (Two) Times a Day As Needed for Mild Pain (mid cycle pain).  Dispense: 10 tablet; Refill: 2            Obesity, morbid, BMI 50 or higher.  Patient will be referred to bariatric surgery for further evaluation and management.     At risk for sleep apnea.  Patient will be referred to sleep medicine for further evaluation and management.     Generalized anxiety disorder.  Post traumatic stress disorder (PTSD).  Panic disorder (episodic paroxysmal anxiety).  Moderate episode of recurrent major depressive disorder.  Prescription of quetiapine and escitalopram were sent to her pharmacy. She will be referred to behavioral health.     Migraine without aura and without status migrainosus, not intractable.  Patient will be started on rizatriptan 5 mg once a day as needed.     Mittelschmerz.   Patient will be started on naproxen 500 mg 2 times a day as needed for pain.         Return for Next scheduled follow up.    I discussed my findings,recommendations, and plan of care was with the patient. They verbalized understanding and agreement.  Patient was encouraged to keep me informed of any acute changes, lack of improvement, or any new concerning symptoms.     Transcribed from ambient dictation for EDGARDO Patterson by Remy Luciano.  04/09/24   17:26 EDT    Patient or patient representative verbalized consent to the visit recording.  I have personally performed the services described in this document as transcribed by the above individual, and it is both accurate and complete.  EDGARDO Patterson  4/11/2024  17:04 EDT

## 2024-04-10 ENCOUNTER — PRIOR AUTHORIZATION (OUTPATIENT)
Dept: INTERNAL MEDICINE | Facility: CLINIC | Age: 29
End: 2024-04-10
Payer: COMMERCIAL

## 2024-04-10 NOTE — TELEPHONE ENCOUNTER
PA submitted on quetiapine.  KEY: [id number]  Approved from 4/10/24 - 4/10/2025.    Pharmacy has been notified by voicemail.

## 2024-04-13 NOTE — TELEPHONE ENCOUNTER
Caller: Lilly Segovia    Relationship: Self    Best call back number: [phone]    Requested Prescriptions:   Requested Prescriptions     Pending Prescriptions Disp Refills   • omeprazole (priLOSEC) 40 MG capsule 90 capsule 1     Sig: Take 1 capsule by mouth Daily.        Pharmacy where request should be sent: [facility] - [address] - [phone] [facility] [phone] FX     Additional details provided by patient: OUT OF MEDICATION    Does the patient have less than a 3 day supply:  [x] Yes  [] No    Joyce Nixon Rep   11/07/22 14:56 EST       
Medication refilled.  Called pt, verified identity and . Per medication list, and provider protocol. Medication was refilled. Informed pt that medication was sent to pharmacy. Informed to called back or send [employer] message for further assistance.   
PA/NP only visit

## 2024-05-17 ENCOUNTER — TELEPHONE (OUTPATIENT)
Dept: INTERNAL MEDICINE | Facility: CLINIC | Age: 29
End: 2024-05-17
Payer: COMMERCIAL

## 2024-05-17 NOTE — TELEPHONE ENCOUNTER
Caller: Lilly Segovia    Relationship to patient: Self    Best call back number: [phone]     PATIENT IS CALLING TO ASK IF SHE CAN TAKE SUDAFED AS SHE READ ON THE PACKAGE, IF YOU HAVE THYROID DISEASE, YOU SHOULD NOT TAKE IT AND SHE HAS HYPERTHYROIDISM.  BEFORE SHE READ THE PACKAGE, SHE DID TAKE TWO.

## 2024-07-09 ENCOUNTER — OFFICE VISIT (OUTPATIENT)
Dept: SLEEP MEDICINE | Age: 29
End: 2024-07-09
Payer: COMMERCIAL

## 2024-07-09 VITALS
HEIGHT: 64 IN | OXYGEN SATURATION: 98 % | DIASTOLIC BLOOD PRESSURE: 84 MMHG | HEART RATE: 74 BPM | SYSTOLIC BLOOD PRESSURE: 130 MMHG | BODY MASS INDEX: 50.02 KG/M2 | WEIGHT: 293 LBS

## 2024-07-09 DIAGNOSIS — G47.19 EXCESSIVE DAYTIME SLEEPINESS: ICD-10-CM

## 2024-07-09 DIAGNOSIS — G47.33 OBSTRUCTIVE SLEEP APNEA, ADULT: ICD-10-CM

## 2024-07-09 DIAGNOSIS — R06.83 SNORING: ICD-10-CM

## 2024-07-09 PROCEDURE — 99244 OFF/OP CNSLTJ NEW/EST MOD 40: CPT | Performed by: INTERNAL MEDICINE

## 2024-07-09 PROCEDURE — 1159F MED LIST DOCD IN RCRD: CPT | Performed by: INTERNAL MEDICINE

## 2024-07-09 PROCEDURE — 1160F RVW MEDS BY RX/DR IN RCRD: CPT | Performed by: INTERNAL MEDICINE

## 2024-07-09 NOTE — PROGRESS NOTES
Lilly Segovia is a 28 y.o. female.   Chief Complaint   Patient presents with    Sleeping Problem       HPI     28 y.o. female seen in consultation at the request of Dior Bach APRN for evaluation of the above.     She has longstanding snoring.  She thinks that she snored as a teenager.  She also has a long standing daytime somnolence and has noted this for about 10 years.    She has been observed by her  to have apneas and snore heavily.  She can awaken herself up choking.  She frequently awakens with morning headaches that seem to dissipate.  She awakens with a dry mouth.  She has had significant elevation in her weight over recent years.    She averages 7 hours of sleep on a nightly basis.  She thinks she averages up to 5 awakenings through the night.  It takes around 30 minutes to initiate sleep.  She typically does not nap during the day.    Kunkle Scale is: 2/24    The patient's relevant past medical, surgical, family, and social history reviewed and updated in Epic as appropriate.    Current medications are:   Current Outpatient Medications:     escitalopram (LEXAPRO) 10 MG tablet, Take 1.5 tablets by mouth Daily., Disp: 45 tablet, Rfl: 2    fluticasone (Flonase) 50 MCG/ACT nasal spray, 2 sprays into the nostril(s) as directed by provider Daily., Disp: 16 g, Rfl: 11    levothyroxine (SYNTHROID, LEVOTHROID) 112 MCG tablet, TAKE 1 TABLET BY MOUTH ONCE DAILY, Disp: 90 tablet, Rfl: 1    naproxen (Naprosyn) 500 MG tablet, Take 1 tablet by mouth 2 (Two) Times a Day As Needed for Mild Pain (mid cycle pain)., Disp: 10 tablet, Rfl: 2    omeprazole (priLOSEC) 40 MG capsule, TAKE 1 CAPSULE BY MOUTH ONCE DAILY, Disp: 30 capsule, Rfl: 5    ProAir  (90 Base) MCG/ACT inhaler, INHALE 2 PUFFS BY MOUTH EVERY 4 HOURS AS NEEDED FOR WHEEZING OR SHORTNESS OF AIR, Disp: 8.5 g, Rfl: 2    QUEtiapine (SEROquel) 25 MG tablet, Take 0.5-1 tablets by mouth 2 (Two) Times a Day As Needed (anxiety/panic)., Disp: 60  "tablet, Rfl: 1    rizatriptan MLT (MAXALT-MLT) 5 MG disintegrating tablet, Place 1 tablet on the tongue 1 (One) Time As Needed for Migraine. May repeat in 2 hours if needed, Disp: 9 tablet, Rfl: 2.    Review of Systems    Review of Systems  ROS documented in patient questionnaire ×14 systems.  Reviewed with patient.  Otherwise negative except as noted in HPI.    Physical Exam    Blood pressure 130/84, pulse 74, height 162.6 cm (64.02\"), weight (!) 149 kg (328 lb), SpO2 98%, not currently breastfeeding. Body mass index is 56.27 kg/m².    Physical Exam  Vitals and nursing note reviewed.   Constitutional:       Appearance: Normal appearance. She is well-developed.   HENT:      Head: Normocephalic and atraumatic.      Nose: Nose normal.      Mouth/Throat:      Mouth: Mucous membranes are moist.      Pharynx: Oropharynx is clear. No oropharyngeal exudate.      Comments: Class IV airway  Eyes:      General: No scleral icterus.     Conjunctiva/sclera: Conjunctivae normal.   Neck:      Thyroid: No thyromegaly.      Trachea: No tracheal deviation.   Cardiovascular:      Rate and Rhythm: Normal rate and regular rhythm.      Heart sounds: No murmur heard.     No friction rub. No gallop.   Pulmonary:      Effort: Pulmonary effort is normal. No respiratory distress.      Breath sounds: No wheezing or rales.   Musculoskeletal:         General: No deformity. Normal range of motion.   Skin:     General: Skin is warm and dry.      Findings: No rash.   Neurological:      Mental Status: She is alert and oriented to person, place, and time.   Psychiatric:         Behavior: Behavior normal.         Thought Content: Thought content normal.         DATA:    Reviewed 4/9/2024 note from EDGARDO Sy    Reviewed bed partner questionnaire    ASSESSMENT:    Problem List Items Addressed This Visit       BMI 50.0-59.9, adult - Primary     Other Visit Diagnoses       Obstructive sleep apnea, adult        Relevant Orders    Home Sleep Study "    Snoring        Relevant Orders    Home Sleep Study    Excessive daytime sleepiness        Relevant Orders    Home Sleep Study            28-year-old female with a high likelihood of obstructive sleep apnea.  She has multiple signs and symptoms of obstructive sleep apnea including snoring, witnessed apneas, nonrestorative sleep, morning headaches, and morning dry mouth.  Morphologically she is at risk for AISHA with her elevated BMI and class IV airway.    We discussed the likely diagnosis obstructive sleep apnea as well as the diagnostic process and treatment options.  She was interested in proceeding as I have noted below.    PLAN:    - Home sleep apnea testing.  - Diagnostic process and potential treatment options discussed and questions/concerns addressed.  - Long-term cardiovascular and metabolic risks of untreated obstructive sleep apnea reviewed with the patient.  - The importance of long-term healthy weight loss discussed.  - Patient was agreeable to a trial of CPAP therapy on an initial trial basis if recommended after testing complete.  - Sleep center follow-up.    I have reviewed the results of my evaluation and impression and discussed my recommendations in detail with the patient.    Signed by  Michele Killian MD    July 9, 2024      CC: Dior Bach APRN Brown, Melissa F, APRN

## 2024-07-17 ENCOUNTER — TELEMEDICINE (OUTPATIENT)
Dept: FAMILY MEDICINE CLINIC | Facility: TELEHEALTH | Age: 29
End: 2024-07-17
Payer: COMMERCIAL

## 2024-07-17 DIAGNOSIS — N89.8 VAGINAL ITCHING: ICD-10-CM

## 2024-07-17 DIAGNOSIS — N89.8 VAGINAL ODOR: Primary | ICD-10-CM

## 2024-07-17 RX ORDER — FLUCONAZOLE 150 MG/1
TABLET ORAL
Qty: 3 TABLET | Refills: 0 | Status: SHIPPED | OUTPATIENT
Start: 2024-07-17

## 2024-07-17 RX ORDER — METRONIDAZOLE 500 MG/1
500 TABLET ORAL 2 TIMES DAILY
Qty: 14 TABLET | Refills: 0 | Status: SHIPPED | OUTPATIENT
Start: 2024-07-17 | End: 2024-07-24

## 2024-07-17 NOTE — PROGRESS NOTES
Subjective   Chief Complaint   Patient presents with    Vaginal Itching     Vaginal odor         Lilly Segovia is a 28 y.o. female.     History of Present Illness  Patient reports lower pelvic pressure/bloating, fishy vaginal odor, vaginal itching and urinary frequency.  She has a history of bacterial vaginosis.  She gets this infrequently usually once a year or less.  Symptoms feel similar to when she has had BV in the past.  Vaginal Itching  The patient's primary symptoms include genital itching and a genital odor. The patient's pertinent negatives include no genital lesions, genital rash, missed menses, pelvic pain, vaginal bleeding or vaginal discharge. This is a new problem. The current episode started in the past 7 days. The problem has been unchanged. Pertinent negatives include no abdominal pain, anorexia, back pain, chills, constipation, diarrhea, discolored urine, dysuria, fever, flank pain, frequency, headaches, hematuria, joint pain, joint swelling, nausea, painful intercourse, rash, sore throat, urgency or vomiting. She has tried nothing for the symptoms. No, her partner does not have an STD. The maximum temperature recorded prior to her arrival was no fever.        Allergies   Allergen Reactions    Cetirizine Anaphylaxis and Unknown - High Severity       Past Medical History:   Diagnosis Date    Acid reflux     ADHD (attention deficit hyperactivity disorder)     Allergic     Anemia 08/06/2019    Asthma     COVID 02/15/2022    Disease of thyroid gland     Dysthymic disorder 05/03/2021    Formatting of this note might be different from the original.  Saw counselors and took medications as a teenager. Was in a mental hospital at one point. Unsure what the diagnoses were. States all medications (lexapro, paxil, prozac) made her feel like a zombie.  In early 2020, tried to take citalopram liquid for 1 week, had side effects (felt drugged up). Stopped medication. Feels like anxiety com    Hypothyroidism      Kidney agenesis 04/19/2021    Formatting of this note might be different from the original.  Went to ER in April 2021 for pain, had CT scan which showed lack of a kidney.    Migraine 03/21/2019    Preeclampsia 12/02/19    PTSD (post-traumatic stress disorder) 12/01/2020    Formatting of this note might be different from the original.  Prior panic attacks.  Went on trip last month and had some anxiety over it.  Worried about a possible COVID exposure but, did not come down with it.   Feels like her anxiety is getting worse.  She is not working, she's at home all day with her daughter and the dog.  She feels like her anxiety is turning into rage, like she needs to yel    Varicella        Past Surgical History:   Procedure Laterality Date    COLONOSCOPY N/A 3/23/2023    Procedure: COLONOSCOPY;  Surgeon: Eder Fragoso MD;  Location: [facility] ENDOSCOPY;  Service: Gastroenterology;  Laterality: N/A;    WISDOM TOOTH EXTRACTION N/A 2010       Social History     Socioeconomic History    Marital status: Single    Number of children: 1    Highest education level: High school graduate   Tobacco Use    Smoking status: Former     Current packs/day: 0.00     Average packs/day: 0.5 packs/day for 5.0 years (2.5 ttl pk-yrs)     Types: Cigarettes     Start date: 1/1/2009     Quit date: 1/1/2014     Years since quitting: 10.5     Passive exposure: Past    Smokeless tobacco: Never   Vaping Use    Vaping status: Never Used   Substance and Sexual Activity    Alcohol use: Not Currently     Alcohol/week: 1.0 - 3.0 standard drink of alcohol     Types: 1 - 3 Glasses of wine per week    Drug use: Not Currently     Types: Marijuana    Sexual activity: Yes     Partners: Male     Birth control/protection: None       Family History   Problem Relation Age of Onset    Anxiety disorder Mother     Depression Mother     Hypertension Mother     Diabetes Mother     Diverticulitis Mother     Pancreatitis Mother     Cholelithiasis Mother      Depression Maternal Aunt     Anxiety disorder Maternal Aunt     Anxiety disorder Maternal Grandmother     Depression Maternal Grandmother          Current Outpatient Medications:     escitalopram (LEXAPRO) 10 MG tablet, Take 1.5 tablets by mouth Daily., Disp: 45 tablet, Rfl: 2    fluconazole (Diflucan) 150 MG tablet, Take 1 tablet every 3 days until finished, Disp: 3 tablet, Rfl: 0    fluticasone (Flonase) 50 MCG/ACT nasal spray, 2 sprays into the nostril(s) as directed by provider Daily., Disp: 16 g, Rfl: 11    levothyroxine (SYNTHROID, LEVOTHROID) 112 MCG tablet, TAKE 1 TABLET BY MOUTH ONCE DAILY, Disp: 90 tablet, Rfl: 1    metroNIDAZOLE (Flagyl) 500 MG tablet, Take 1 tablet by mouth 2 (Two) Times a Day for 7 days., Disp: 14 tablet, Rfl: 0    naproxen (Naprosyn) 500 MG tablet, Take 1 tablet by mouth 2 (Two) Times a Day As Needed for Mild Pain (mid cycle pain)., Disp: 10 tablet, Rfl: 2    omeprazole (priLOSEC) 40 MG capsule, TAKE 1 CAPSULE BY MOUTH ONCE DAILY, Disp: 30 capsule, Rfl: 5    ProAir  (90 Base) MCG/ACT inhaler, INHALE 2 PUFFS BY MOUTH EVERY 4 HOURS AS NEEDED FOR WHEEZING OR SHORTNESS OF AIR, Disp: 8.5 g, Rfl: 2    QUEtiapine (SEROquel) 25 MG tablet, Take 0.5-1 tablets by mouth 2 (Two) Times a Day As Needed (anxiety/panic)., Disp: 60 tablet, Rfl: 1    rizatriptan MLT (MAXALT-MLT) 5 MG disintegrating tablet, Place 1 tablet on the tongue 1 (One) Time As Needed for Migraine. May repeat in 2 hours if needed, Disp: 9 tablet, Rfl: 2      Review of Systems   Constitutional:  Negative for chills, diaphoresis, fatigue and fever.   HENT:  Negative for sore throat.    Gastrointestinal:  Negative for abdominal pain, anorexia, constipation, diarrhea, nausea and vomiting.   Genitourinary:  Positive for pelvic pressure. Negative for decreased urine volume, dysuria, flank pain, frequency, hematuria, missed menses, pelvic pain, urgency, urinary incontinence, vaginal bleeding, vaginal discharge and vaginal pain.    Musculoskeletal:  Negative for back pain and joint pain.   Skin:  Negative for rash.        There were no vitals filed for this visit.    Objective   Physical Exam  Constitutional:       General: She is not in acute distress.     Appearance: Normal appearance. She is not ill-appearing, toxic-appearing or diaphoretic.   HENT:      Head: Normocephalic.      Mouth/Throat:      Lips: Pink.      Mouth: Mucous membranes are moist.   Pulmonary:      Effort: Pulmonary effort is normal.   Abdominal:      Tenderness: There is no abdominal tenderness (per pt).   Neurological:      Mental Status: She is alert and oriented to person, place, and time.          Procedures     Assessment & Plan   Diagnoses and all orders for this visit:    1. Vaginal odor (Primary)  -     metroNIDAZOLE (Flagyl) 500 MG tablet; Take 1 tablet by mouth 2 (Two) Times a Day for 7 days.  Dispense: 14 tablet; Refill: 0    2. Vaginal itching  -     fluconazole (Diflucan) 150 MG tablet; Take 1 tablet every 3 days until finished  Dispense: 3 tablet; Refill: 0        If symptoms worsen or do not improve follow up with your Gynecologist, PCP or visit your nearest Urgent Care Center for testing.    PLAN: Discussed dosing, side effects, recommended other symptomatic care.  Patient should follow up with primary care provider, Urgent Care or ER if symptoms worsen, fail to resolve or other symptoms need attention. Patient/family agree to the above.         EDGARDO Roy     The use of a video visit has been reviewed with the patient and verbal informed consent has been obtained. Myself and Lilly Segovia participated in this visit. The patient is located at [facility] [facility] [address]  [place] [id number]. I am located in [place], [place]. Mychart and Zoom were utilized.        This visit was performed via Telehealth.  This patient has been instructed to follow-up with their primary care provider if their symptoms worsen or the treatment provided does not resolve  their illness.

## 2024-07-17 NOTE — PATIENT INSTRUCTIONS
If symptoms worsen or do not improve follow up with your Gynecologist, PCP or visit your nearest Urgent Care Center for testing.

## 2024-08-01 ENCOUNTER — TELEPHONE (OUTPATIENT)
Dept: OBSTETRICS AND GYNECOLOGY | Facility: CLINIC | Age: 29
End: 2024-08-01
Payer: COMMERCIAL

## 2024-08-01 RX ORDER — METRONIDAZOLE 7.5 MG/G
1 GEL VAGINAL NIGHTLY
Qty: 1 EACH | Refills: 0 | Status: SHIPPED | OUTPATIENT
Start: 2024-08-01 | End: 2024-08-08

## 2024-08-01 NOTE — TELEPHONE ENCOUNTER
Let her know a prescription for metrogel vaginally will be sent to her pharmacy with instructions for use.  EDGARDO Abbott       Called and spoke with patient, informed her of response, she verified understanding and was appreciative. We also got patient scheduled for annual appt.

## 2024-08-01 NOTE — TELEPHONE ENCOUNTER
Called and spoke with patient, she states that her symptoms began around the middle of July.  She reports abnormal vaginal odor (fishy quality), some vaginal itching, no vaginal discharge.  She did start taking a course of ABx at the end of July and she could not finish due to GI intolerance (Metronidazole).  She took all of the Diflucan that was called in for her though at that time (dated 7/17).  No recent product changes. She would like to know if something could be called in for her in a liquid form as she has a very hard time swallowing pills.

## 2024-08-01 NOTE — TELEPHONE ENCOUNTER
Provider: EDGARDO Abbott    Caller: SOPHIA JOYNER    Relationship to Patient: SELF    Pharmacy: [facility] - [address] - [phone] [facility] [phone]  [phone]     Phone Number: [phone]    Reason for Call: POSSIBLE BV    When was the patient last seen: 08.22.22    PATIENT WOULD LIKE TO BE SEEN DUE TO HAVING POSSIBLE BV      PATIENT WAS SEEN AT THE  BUT THE MEDICATION MADE HER SICK SO SHE STOPPED TAKING THE MEDICATION.      PATIENT CAN BE REACHED .[phone]    THANK YOU

## 2024-08-13 ENCOUNTER — OFFICE VISIT (OUTPATIENT)
Dept: INTERNAL MEDICINE | Facility: CLINIC | Age: 29
End: 2024-08-13
Payer: COMMERCIAL

## 2024-08-13 ENCOUNTER — LAB (OUTPATIENT)
Dept: INTERNAL MEDICINE | Facility: CLINIC | Age: 29
End: 2024-08-13
Payer: COMMERCIAL

## 2024-08-13 VITALS
TEMPERATURE: 98 F | HEART RATE: 72 BPM | SYSTOLIC BLOOD PRESSURE: 128 MMHG | WEIGHT: 293 LBS | DIASTOLIC BLOOD PRESSURE: 72 MMHG | BODY MASS INDEX: 50.02 KG/M2 | OXYGEN SATURATION: 98 % | HEIGHT: 64 IN | RESPIRATION RATE: 18 BRPM

## 2024-08-13 DIAGNOSIS — G43.009 MIGRAINE WITHOUT AURA AND WITHOUT STATUS MIGRAINOSUS, NOT INTRACTABLE: ICD-10-CM

## 2024-08-13 DIAGNOSIS — N92.6 LATE MENSES: ICD-10-CM

## 2024-08-13 DIAGNOSIS — Z00.00 ANNUAL PHYSICAL EXAM: Primary | ICD-10-CM

## 2024-08-13 DIAGNOSIS — F43.10 POST TRAUMATIC STRESS DISORDER (PTSD): ICD-10-CM

## 2024-08-13 DIAGNOSIS — Z11.3 SCREEN FOR STD (SEXUALLY TRANSMITTED DISEASE): ICD-10-CM

## 2024-08-13 DIAGNOSIS — F43.10 POST TRAUMATIC STRESS DISORDER (PTSD): Chronic | ICD-10-CM

## 2024-08-13 DIAGNOSIS — F41.0 PANIC DISORDER (EPISODIC PAROXYSMAL ANXIETY): ICD-10-CM

## 2024-08-13 DIAGNOSIS — E66.01 OBESITY, MORBID, BMI 50 OR HIGHER: ICD-10-CM

## 2024-08-13 DIAGNOSIS — E03.9 ACQUIRED HYPOTHYROIDISM: ICD-10-CM

## 2024-08-13 DIAGNOSIS — F41.1 GENERALIZED ANXIETY DISORDER: Chronic | ICD-10-CM

## 2024-08-13 DIAGNOSIS — R73.09 ELEVATED GLUCOSE: ICD-10-CM

## 2024-08-13 DIAGNOSIS — F33.1 MODERATE EPISODE OF RECURRENT MAJOR DEPRESSIVE DISORDER: Chronic | ICD-10-CM

## 2024-08-13 DIAGNOSIS — F41.0 PANIC DISORDER (EPISODIC PAROXYSMAL ANXIETY): Chronic | ICD-10-CM

## 2024-08-13 DIAGNOSIS — F33.1 MODERATE EPISODE OF RECURRENT MAJOR DEPRESSIVE DISORDER: ICD-10-CM

## 2024-08-13 DIAGNOSIS — G56.02 CARPAL TUNNEL SYNDROME OF LEFT WRIST: ICD-10-CM

## 2024-08-13 DIAGNOSIS — Z00.00 ANNUAL PHYSICAL EXAM: ICD-10-CM

## 2024-08-13 DIAGNOSIS — F41.1 GENERALIZED ANXIETY DISORDER: ICD-10-CM

## 2024-08-13 LAB
ALBUMIN SERPL-MCNC: 4.4 G/DL (ref 3.5–5.2)
ALBUMIN/GLOB SERPL: 1.6 G/DL
ALP SERPL-CCNC: 71 U/L (ref 39–117)
ALT SERPL W P-5'-P-CCNC: 46 U/L (ref 1–33)
ANION GAP SERPL CALCULATED.3IONS-SCNC: 12 MMOL/L (ref 5–15)
AST SERPL-CCNC: 31 U/L (ref 1–32)
BILIRUB SERPL-MCNC: 0.2 MG/DL (ref 0–1.2)
BUN SERPL-MCNC: 11 MG/DL (ref 6–20)
BUN/CREAT SERPL: 18 (ref 7–25)
CALCIUM SPEC-SCNC: 9.5 MG/DL (ref 8.6–10.5)
CHLORIDE SERPL-SCNC: 106 MMOL/L (ref 98–107)
CHOLEST SERPL-MCNC: 182 MG/DL (ref 0–200)
CO2 SERPL-SCNC: 22 MMOL/L (ref 22–29)
CREAT SERPL-MCNC: 0.61 MG/DL (ref 0.57–1)
DEPRECATED RDW RBC AUTO: 44.8 FL (ref 37–54)
EGFRCR SERPLBLD CKD-EPI 2021: 123.5 ML/MIN/1.73
ERYTHROCYTE [DISTWIDTH] IN BLOOD BY AUTOMATED COUNT: 14.4 % (ref 12.3–15.4)
GLOBULIN UR ELPH-MCNC: 2.7 GM/DL
GLUCOSE SERPL-MCNC: 95 MG/DL (ref 65–99)
HAV IGM SERPL QL IA: NORMAL
HBA1C MFR BLD: 5.4 % (ref 4.8–5.6)
HBV CORE IGM SERPL QL IA: NORMAL
HBV SURFACE AG SERPL QL IA: NORMAL
HCT VFR BLD AUTO: 39 % (ref 34–46.6)
HCV AB SER QL: NORMAL
HDLC SERPL-MCNC: 32 MG/DL (ref 40–60)
HGB BLD-MCNC: 12.8 G/DL (ref 12–15.9)
LDLC SERPL CALC-MCNC: 130 MG/DL (ref 0–100)
LDLC/HDLC SERPL: 4 {RATIO}
MCH RBC QN AUTO: 28.1 PG (ref 26.6–33)
MCHC RBC AUTO-ENTMCNC: 32.8 G/DL (ref 31.5–35.7)
MCV RBC AUTO: 85.7 FL (ref 79–97)
PLATELET # BLD AUTO: 254 10*3/MM3 (ref 140–450)
PMV BLD AUTO: 10.5 FL (ref 6–12)
POTASSIUM SERPL-SCNC: 3.8 MMOL/L (ref 3.5–5.2)
PROT SERPL-MCNC: 7.1 G/DL (ref 6–8.5)
RBC # BLD AUTO: 4.55 10*6/MM3 (ref 3.77–5.28)
SODIUM SERPL-SCNC: 140 MMOL/L (ref 136–145)
TRIGL SERPL-MCNC: 110 MG/DL (ref 0–150)
TSH SERPL DL<=0.05 MIU/L-ACNC: 4.44 UIU/ML (ref 0.27–4.2)
VLDLC SERPL-MCNC: 20 MG/DL (ref 5–40)
WBC NRBC COR # BLD AUTO: 7.65 10*3/MM3 (ref 3.4–10.8)

## 2024-08-13 PROCEDURE — 87491 CHLMYD TRACH DNA AMP PROBE: CPT | Performed by: NURSE PRACTITIONER

## 2024-08-13 PROCEDURE — 80053 COMPREHEN METABOLIC PANEL: CPT | Performed by: NURSE PRACTITIONER

## 2024-08-13 PROCEDURE — 84439 ASSAY OF FREE THYROXINE: CPT | Performed by: NURSE PRACTITIONER

## 2024-08-13 PROCEDURE — G0432 EIA HIV-1/HIV-2 SCREEN: HCPCS | Performed by: NURSE PRACTITIONER

## 2024-08-13 PROCEDURE — 84443 ASSAY THYROID STIM HORMONE: CPT | Performed by: NURSE PRACTITIONER

## 2024-08-13 PROCEDURE — 86696 HERPES SIMPLEX TYPE 2 TEST: CPT | Performed by: NURSE PRACTITIONER

## 2024-08-13 PROCEDURE — 86592 SYPHILIS TEST NON-TREP QUAL: CPT | Performed by: NURSE PRACTITIONER

## 2024-08-13 PROCEDURE — 85027 COMPLETE CBC AUTOMATED: CPT | Performed by: NURSE PRACTITIONER

## 2024-08-13 PROCEDURE — 87661 TRICHOMONAS VAGINALIS AMPLIF: CPT | Performed by: NURSE PRACTITIONER

## 2024-08-13 PROCEDURE — 36415 COLL VENOUS BLD VENIPUNCTURE: CPT | Performed by: NURSE PRACTITIONER

## 2024-08-13 PROCEDURE — 80061 LIPID PANEL: CPT | Performed by: NURSE PRACTITIONER

## 2024-08-13 PROCEDURE — 87591 N.GONORRHOEAE DNA AMP PROB: CPT | Performed by: NURSE PRACTITIONER

## 2024-08-13 PROCEDURE — 86695 HERPES SIMPLEX TYPE 1 TEST: CPT | Performed by: NURSE PRACTITIONER

## 2024-08-13 PROCEDURE — 84702 CHORIONIC GONADOTROPIN TEST: CPT | Performed by: NURSE PRACTITIONER

## 2024-08-13 PROCEDURE — 83036 HEMOGLOBIN GLYCOSYLATED A1C: CPT | Performed by: NURSE PRACTITIONER

## 2024-08-13 PROCEDURE — 80074 ACUTE HEPATITIS PANEL: CPT | Performed by: NURSE PRACTITIONER

## 2024-08-13 RX ORDER — ESCITALOPRAM OXALATE 10 MG/1
TABLET ORAL
Qty: 45 TABLET | Refills: 2 | Status: SHIPPED | OUTPATIENT
Start: 2024-08-13

## 2024-08-13 RX ORDER — TOPIRAMATE 25 MG/1
25 TABLET ORAL NIGHTLY
Qty: 30 TABLET | Refills: 1 | Status: SHIPPED | OUTPATIENT
Start: 2024-08-13

## 2024-08-13 RX ORDER — QUETIAPINE FUMARATE 25 MG/1
TABLET, FILM COATED ORAL
Qty: 60 TABLET | Refills: 1 | Status: SHIPPED | OUTPATIENT
Start: 2024-08-13

## 2024-08-13 NOTE — PROGRESS NOTES
Patient Care Team:  Dior Bach APRN as PCP - General (Nurse Practitioner)  Michele Killian MD as Consulting Physician (Sleep Medicine)  Mabel Osorio CNM as Midwife (Certified Nurse Midwife)     Chief Complaint   Patient presents with    Annual Exam    Migraine     Pt states the rizatriptan is not working well at all            Patient is a 28 y.o. female who presents for her yearly physical exam.     HPI      Saw Dr. Killian 7/9/24 for eval of poss sleep apnea. Setting up home sleep study    See's gyn     Migraines- having nearly every day. At least 4 times a week.   Has tried maxalt..  Helps a bit    Anxiety worse.   See's a therapist at [facility] in [place].     Stopped the lexapro after forgetting once and never restarted. She feels like it only helped with anger but not really anxiety.    She reports more  travel anxiety than anything.    Bupropion did not help  Fluoxetine did not help   Venlafaxine- felt like room spinning.   Cymbalta-  does not recall.   She has tried buspar and did not work.   She tried hydroxyzine and did not help    Has a hard time in personal life. Having trouble making herself do anything.          Left hand pain    She had carpal tunnel in the past but does not feel the same.    No numbness or tingling.   No injury.   Worse with repetitive movements.         Health maintenance/lifestyle:  Health Maintenance   Topic Date Due    ANNUAL PHYSICAL  06/19/2024    INFLUENZA VACCINE  08/01/2024    COVID-19 Vaccine (4 - 2023-24 season) 11/02/2024 (Originally 9/1/2023)    BMI FOLLOWUP  04/09/2025    PAP SMEAR  08/22/2025    TDAP/TD VACCINES (5 - Td or Tdap) 11/05/2029    HEPATITIS C SCREENING  Completed    Pneumococcal Vaccine 0-64  Completed     Immunization History   Administered Date(s) Administered    COVID-19 (PFIZER) Purple Cap Monovalent 03/03/2021, 03/26/2021, 10/23/2021    DTaP, Unspecified 03/12/1998    Fluzone (or Fluarix & Flulaval for VFC) >6mos  10/08/2018    HPV Quadrivalent 06/07/2007, 07/12/2007, 12/20/2007    Hep A, 2 Dose 06/07/2007, 12/20/2007    Hep B, Adolescent or Pediatric 04/19/2005    IPV 08/12/2008    MMR 03/12/1998    Meningococcal MCV4P (Menactra) 08/12/2008    OPV 03/12/1998    PPD Test 09/10/2020    Pneumococcal Conjugate 20-Valent (PCV20) 06/19/2023    Td (TDVAX) 04/19/2005    Tdap 08/12/2008, 04/10/2018, 11/05/2019     Cancer-related family history is not on file.   reports being sexually active and has had partner(s) who are male. She reports using the following method of birth control/protection: None.  Social History     Tobacco Use   Smoking Status Former    Current packs/day: 0.00    Average packs/day: 0.5 packs/day for 5.0 years (2.5 ttl pk-yrs)    Types: Cigarettes    Start date: 1/1/2009    Quit date: 1/1/2014    Years since quitting: 10.6    Passive exposure: Past   Smokeless Tobacco Never     Social History     Substance and Sexual Activity   Alcohol Use Not Currently    Alcohol/week: 1.0 - 3.0 standard drink of alcohol    Types: 1 - 3 Glasses of wine per week           PHQ-2 Depression Screening  Little interest or pleasure in doing things? 2-->more than half the days   Feeling down, depressed, or hopeless? 1-->several days   PHQ-2 Total Score 13             History  Social History     Socioeconomic History    Marital status: Single    Number of children: 1    Highest education level: High school graduate   Tobacco Use    Smoking status: Former     Current packs/day: 0.00     Average packs/day: 0.5 packs/day for 5.0 years (2.5 ttl pk-yrs)     Types: Cigarettes     Start date: 1/1/2009     Quit date: 1/1/2014     Years since quitting: 10.6     Passive exposure: Past    Smokeless tobacco: Never   Vaping Use    Vaping status: Never Used   Substance and Sexual Activity    Alcohol use: Not Currently     Alcohol/week: 1.0 - 3.0 standard drink of alcohol     Types: 1 - 3 Glasses of wine per week    Drug use: Not Currently     Types:  Marijuana    Sexual activity: Yes     Partners: Male     Birth control/protection: None     Past Medical History:   Diagnosis Date    Acid reflux     ADHD (attention deficit hyperactivity disorder)     Allergic     Anemia 08/06/2019    Asthma     COVID 02/15/2022    Disease of thyroid gland     Dysthymic disorder 05/03/2021    Formatting of this note might be different from the original.  Saw counselors and took medications as a teenager. Was in a mental hospital at one point. Unsure what the diagnoses were. States all medications (lexapro, paxil, prozac) made her feel like a zombie.  In early 2020, tried to take citalopram liquid for 1 week, had side effects (felt drugged up). Stopped medication. Feels like anxiety com    Hypothyroidism     Kidney agenesis 04/19/2021    Formatting of this note might be different from the original.  Went to ER in April 2021 for pain, had CT scan which showed lack of a kidney.    Migraine 03/21/2019    Preeclampsia 12/02/19    PTSD (post-traumatic stress disorder) 12/01/2020    Formatting of this note might be different from the original.  Prior panic attacks.  Went on trip last month and had some anxiety over it.  Worried about a possible COVID exposure but, did not come down with it.   Feels like her anxiety is getting worse.  She is not working, she's at home all day with her daughter and the dog.  She feels like her anxiety is turning into rage, like she needs to yel    Varicella       Past Surgical History:   Procedure Laterality Date    COLONOSCOPY N/A 3/23/2023    Procedure: COLONOSCOPY;  Surgeon: Eder Fragoso MD;  Location[facility] END[facility]COPY;  Service: Gastroenterology;  Laterality: N/A;    WISDOM TOOTH EXTRACTION N/A 2010      Allergies   Allergen Reactions    Cetirizine Anaphylaxis and Unknown - High Severity      Family History   Problem Relation Age of Onset    Anxiety disorder Mother     Depression Mother     Hypertension Mother     Diabetes Mother      "Diverticulitis Mother     Pancreatitis Mother     Cholelithiasis Mother     Depression Maternal Aunt     Anxiety disorder Maternal Aunt     Anxiety disorder Maternal Grandmother     Depression Maternal Grandmother        Current Outpatient Medications:     fluticasone (Flonase) 50 MCG/ACT nasal spray, 2 sprays into the nostril(s) as directed by provider Daily., Disp: 16 g, Rfl: 11    naproxen (Naprosyn) 500 MG tablet, Take 1 tablet by mouth 2 (Two) Times a Day As Needed for Mild Pain (mid cycle pain)., Disp: 10 tablet, Rfl: 2    omeprazole (priLOSEC) 40 MG capsule, TAKE 1 CAPSULE BY MOUTH ONCE DAILY, Disp: 30 capsule, Rfl: 5    ProAir  (90 Base) MCG/ACT inhaler, INHALE 2 PUFFS BY MOUTH EVERY 4 HOURS AS NEEDED FOR WHEEZING OR SHORTNESS OF AIR, Disp: 8.5 g, Rfl: 2    rizatriptan MLT (MAXALT-MLT) 5 MG disintegrating tablet, Place 1 tablet on the tongue 1 (One) Time As Needed for Migraine. May repeat in 2 hours if needed, Disp: 9 tablet, Rfl: 2    escitalopram (LEXAPRO) 10 MG tablet, TAKE 1 & 1/2 TABLET BY MOUTH ONCE DAILY, Disp: 45 tablet, Rfl: 2    levothyroxine (SYNTHROID, LEVOTHROID) 125 MCG tablet, Take 1 tablet by mouth Daily., Disp: 30 tablet, Rfl: 3    QUEtiapine (SEROquel) 25 MG tablet, TAKE 1/2 TO 1 TABLET BY MOUTH TWICE DAILY AS NEEDED FOR ANXIETY/PANIC, Disp: 60 tablet, Rfl: 1    topiramate (Topamax) 25 MG tablet, Take 1 tablet by mouth Every Night., Disp: 30 tablet, Rfl: 1                /72 (BP Location: Right arm, Patient Position: Sitting, Cuff Size: Large Adult)   Pulse 72   Temp 98 °F (36.7 °C) (Infrared)   Resp 18   Ht 162.6 cm (64\")   Wt (!) 149 kg (329 lb 3.2 oz)   SpO2 98%   BMI 56.51 kg/m²       Physical Exam  Constitutional:       Appearance: Normal appearance. She is well-developed. She is morbidly obese.   HENT:      Head: Normocephalic and atraumatic.   Eyes:      General: No scleral icterus.     Conjunctiva/sclera: Conjunctivae normal.   Cardiovascular:      Rate and " Rhythm: Normal rate and regular rhythm.      Heart sounds: Normal heart sounds.   Pulmonary:      Effort: Pulmonary effort is normal. No respiratory distress.      Breath sounds: Normal breath sounds.   Abdominal:      General: Bowel sounds are normal.      Palpations: Abdomen is soft.      Tenderness: There is no abdominal tenderness.   Musculoskeletal:         General: Normal range of motion.      Cervical back: Normal range of motion.      Right lower leg: No edema.      Left lower leg: No edema.      Comments: + tinel left   Skin:     General: Skin is warm and dry.   Neurological:      General: No focal deficit present.      Mental Status: She is alert and oriented to person, place, and time.   Psychiatric:         Attention and Perception: Attention and perception normal.         Mood and Affect: Mood and affect normal.         Speech: Speech normal.         Behavior: Behavior normal. Behavior is cooperative.         Thought Content: Thought content normal. Thought content does not include homicidal or suicidal ideation.         Cognition and Memory: Cognition and memory normal.         Judgment: Judgment normal.                   Diagnoses and all orders for this visit:    1. Annual physical exam (Primary)  -     CBC (No Diff); Future  -     Comprehensive Metabolic Panel; Future  -     Lipid Panel; Future  -     CBC (No Diff); Future  -     Comprehensive Metabolic Panel; Future  -     Lipid Panel; Future  -     Chlamydia trachomatis, Neisseria gonorrhoeae, Trichomonas vaginalis, PCR - Urine, Urine, Clean Catch; Future  -     Hepatitis Panel, Acute; Future  -     HIV-1 / O / 2 Ag / Antibody; Future  -     HSV 1 & 2 - Specific Antibody, IgG; Future  -     RPR Qualitative with Reflex to Quant; Future  -     hCG, Quantitative, Pregnancy; Future    2. Migraine without aura and without status migrainosus, not intractable  -     topiramate (Topamax) 25 MG tablet; Take 1 tablet by mouth Every Night.  Dispense: 30  tablet; Refill: 1    3. Obesity, morbid, BMI 50 or higher    4. Acquired hypothyroidism  -     TSH Rfx On Abnormal To Free T4; Future    5. Generalized anxiety disorder  -     GeneSight - Swab,; Future  -     GeneSight - Swab,    6. Post traumatic stress disorder (PTSD)  -     GeneSight - Swab,; Future  -     GeneSight - Swab,    7. Panic disorder (episodic paroxysmal anxiety)  -     GeneSight - Swab,; Future  -     GeneSight - Swab,    8. Moderate episode of recurrent major depressive disorder  -     GeneSight - Swab,; Future  -     GeneSight - Swab,    9. Late menses    10. Screen for STD (sexually transmitted disease)  -     Chlamydia trachomatis, Neisseria gonorrhoeae, Trichomonas vaginalis, PCR - Urine, Urine, Clean Catch; Future  -     Hepatitis Panel, Acute; Future  -     HIV-1 / O / 2 Ag / Antibody; Future  -     HSV 1 & 2 - Specific Antibody, IgG; Future  -     RPR Qualitative with Reflex to Quant; Future  -     hCG, Quantitative, Pregnancy; Future    11. Carpal tunnel syndrome of left wrist  -      Wrist Hand Orthosis, Wrist Extension Control Cock-up    12. Elevated glucose  -     Hemoglobin A1c; Future        Update labs.  Exam consistent with carpal tunnel.  Prescription for Splint provided the patient can take to [facility] to obtain.  Routine screening for STDs at her request.  No symptoms.  She request a pregnancy test as she reports her period is a little bit late.  Anxiety and depression are not fully controlled but she has tried multiple medications in the past and has had issues with most of them.  Will complete GeneSight testing today to aid in augmentation/switching medications at future visit.  Migraines-will go ahead and get her started on topiramate daily.  May need to adjust dosing in the near future.  Can use Maxalt for as needed as well.  Hypothyroidism-check TSH.  Continue levothyroxine      Labs  ordered as above if indicated- will notify of results and treat accordingly. If  patient has not received results within one week via mychart or letter, they will notify my office  Immunizations and screenings: preventative/screenings are up-to-date/addressed as noted in the HPI.  Counseling: The patient is advised to lose weight      Follow up: Return for FU in 2 weeks to review genesight.  Plan of care discussed with pt. They verbalized understanding and agreement.     Electronically signed by : EDGARDO Patterson   8/25/2024   15:16 EDT

## 2024-08-13 NOTE — TELEPHONE ENCOUNTER
Rx Refill Note  Requested Prescriptions     Pending Prescriptions Disp Refills    QUEtiapine (SEROquel) 25 MG tablet [Pharmacy Med Name: QUETIAPINE FUMARATE 25 MG T 25 Tablet] 60 tablet 1     Sig: TAKE 1/2 TO 1 TABLET BY MOUTH TWICE DAILY AS NEEDED FOR ANXIETY/PANIC    escitalopram (LEXAPRO) 10 MG tablet [Pharmacy Med Name: ESCITALOPRAM * 10 MG TABLET 10 Tablet] 45 tablet 2     Sig: TAKE 1 & 1/2 TABLET BY MOUTH ONCE DAILY      Last office visit with prescribing clinician: 8/13/2024   Last telemedicine visit with prescribing clinician: Visit date not found   Next office visit with prescribing clinician: 8/28/2024     Joanne Maciel MA  08/13/24, 17:22 EDT

## 2024-08-14 LAB
HCG INTACT+B SERPL-ACNC: <1 MIU/ML
HIV 1+2 AB+HIV1 P24 AG SERPL QL IA: NORMAL
RPR SER QL: NORMAL
T4 FREE SERPL-MCNC: 1.23 NG/DL (ref 0.92–1.68)

## 2024-08-15 LAB
C TRACH RRNA SPEC QL NAA+PROBE: NEGATIVE
HSV1 IGG SER IA-ACNC: <0.91 INDEX (ref 0–0.9)
HSV2 IGG SER IA-ACNC: <0.91 INDEX (ref 0–0.9)
N GONORRHOEA RRNA SPEC QL NAA+PROBE: NEGATIVE
T VAGINALIS RRNA SPEC QL NAA+PROBE: NEGATIVE

## 2024-08-23 DIAGNOSIS — E03.9 ACQUIRED HYPOTHYROIDISM: ICD-10-CM

## 2024-08-23 RX ORDER — LEVOTHYROXINE SODIUM 0.12 MG/1
125 TABLET ORAL DAILY
Qty: 30 TABLET | Refills: 3 | Status: SHIPPED | OUTPATIENT
Start: 2024-08-23

## 2024-08-26 ENCOUNTER — TELEPHONE (OUTPATIENT)
Dept: INTERNAL MEDICINE | Facility: CLINIC | Age: 29
End: 2024-08-26
Payer: COMMERCIAL

## 2024-08-26 NOTE — TELEPHONE ENCOUNTER
Called and lvm for patient to return call, office number given.     RELAY:       Your labs show that your TSH is slightly elevated.  Your LDL cholesterol is still elevated but improved from a year ago.  STD screening was negative.  Pregnancy test was negative the rest of your labs are stable/acceptable  I will send in a slightly higher dose of your levothyroxine for you to start.  Then you will need to follow-up in 3 months for repeat labs   Written by EDGARDO Velázquez on 8/23/2024 11:11 AM EDT

## 2024-08-27 ENCOUNTER — TELEPHONE (OUTPATIENT)
Dept: INTERNAL MEDICINE | Facility: CLINIC | Age: 29
End: 2024-08-27
Payer: COMMERCIAL

## 2024-08-27 DIAGNOSIS — G56.03 BILATERAL CARPAL TUNNEL SYNDROME: Primary | ICD-10-CM

## 2024-08-27 NOTE — TELEPHONE ENCOUNTER
Name: Lilly Segovia      Relationship: Self      Best Callback Number: [phone]       HUB PROVIDED THE RELAY MESSAGE FROM THE OFFICE      PATIENT: VOICED UNDERSTANDING AND HAS NO FURTHER QUESTIONS AT THIS TIME    ADDITIONAL INFORMATION:

## 2024-08-27 NOTE — TELEPHONE ENCOUNTER
Hub to relay:    RELAY:       Your labs show that your TSH is slightly elevated.  Your LDL cholesterol is still elevated but improved from a year ago.  STD screening was negative.  Pregnancy test was negative the rest of your labs are stable/acceptable  I will send in a slightly higher dose of your levothyroxine for you to start.  Then you will need to follow-up in 3 months for repeat labs   Written by EDGARDO Velzáquez on 8/23/2024 11:11 AM EDT

## 2024-08-27 NOTE — TELEPHONE ENCOUNTER
Caller: Lilly Segovia    Relationship: Self    Best call back number: [phone]     What orders are you requesting (i.e. lab or imaging): [facility] FOR LEFT AND RIGHT WRISTS.    In what timeframe would the patient need to come in: ASAP    Where will you receive your lab/imaging services: [facility]  [phone]    Additional notes: PT STATED BOTH WRISTS ARE CAUSING HER PAIN NOW AND SHE WOULD LIKE THE BRACES FOR BOTH.   PT HAS AN APPT WITH [facility]  WEDNESDAY 08-28-24 AT 11 AM.

## 2024-08-28 ENCOUNTER — OFFICE VISIT (OUTPATIENT)
Dept: INTERNAL MEDICINE | Facility: CLINIC | Age: 29
End: 2024-08-28
Payer: COMMERCIAL

## 2024-08-28 VITALS
TEMPERATURE: 98.4 F | HEART RATE: 76 BPM | HEIGHT: 64 IN | WEIGHT: 293 LBS | RESPIRATION RATE: 18 BRPM | DIASTOLIC BLOOD PRESSURE: 68 MMHG | BODY MASS INDEX: 50.02 KG/M2 | OXYGEN SATURATION: 97 % | SYSTOLIC BLOOD PRESSURE: 124 MMHG

## 2024-08-28 DIAGNOSIS — F43.10 POST TRAUMATIC STRESS DISORDER (PTSD): ICD-10-CM

## 2024-08-28 DIAGNOSIS — Z79.899 HIGH RISK MEDICATION USE: Primary | ICD-10-CM

## 2024-08-28 DIAGNOSIS — F39 UNSPECIFIED MOOD (AFFECTIVE) DISORDER: ICD-10-CM

## 2024-08-28 DIAGNOSIS — F41.1 GENERALIZED ANXIETY DISORDER: ICD-10-CM

## 2024-08-28 DIAGNOSIS — F41.0 PANIC ATTACK: ICD-10-CM

## 2024-08-28 DIAGNOSIS — F33.1 MODERATE EPISODE OF RECURRENT MAJOR DEPRESSIVE DISORDER: ICD-10-CM

## 2024-08-28 PROCEDURE — 1160F RVW MEDS BY RX/DR IN RCRD: CPT | Performed by: NURSE PRACTITIONER

## 2024-08-28 PROCEDURE — 99214 OFFICE O/P EST MOD 30 MIN: CPT | Performed by: NURSE PRACTITIONER

## 2024-08-28 PROCEDURE — 1125F AMNT PAIN NOTED PAIN PRSNT: CPT | Performed by: NURSE PRACTITIONER

## 2024-08-28 PROCEDURE — 1159F MED LIST DOCD IN RCRD: CPT | Performed by: NURSE PRACTITIONER

## 2024-08-28 RX ORDER — LAMOTRIGINE 25 MG/1
25 TABLET, CHEWABLE ORAL DAILY
Qty: 30 TABLET | Refills: 1 | Status: SHIPPED | OUTPATIENT
Start: 2024-08-28

## 2024-08-28 RX ORDER — ALPRAZOLAM 0.25 MG
0.25 TABLET ORAL 2 TIMES DAILY PRN
Qty: 10 TABLET | Refills: 0 | Status: SHIPPED | OUTPATIENT
Start: 2024-08-28

## 2024-08-28 NOTE — TELEPHONE ENCOUNTER
Faxed over order and patient demographics for insurance information. Patient has been notified order has been faxed.

## 2024-08-28 NOTE — PROGRESS NOTES
Subjective   Lilly Segovia is a 28 y.o. female.     Chief Complaint   Patient presents with    discuss results     Genesight       PCP: Dior Bach APRN    History of Present Illness /Review of systems    History of Present Illness  The patient is a 28-year-old female who is here to discuss the results of GeneSight testing.    She has generalized anxiety disorder, panic disorder, depression, and PTSD. At the last visit, a GeneSight swab was performed due to worsening anxiety. She has previously been on Lexapro, which she stopped after forgetting to take it once. She felt it only helped with anger, not anxiety. Historically, bupropion and fluoxetine were ineffective. Venlafaxine caused her to feel like the room was spinning. Cymbalta was attempted, but she does not recall why it was stopped. BuSpar and hydroxyzine were also ineffective.    She is currently under the care of a behavioral health therapist. Over the past week, she has experienced daily panic attacks characterized by difficulty breathing, irritability, chest tightness, and throat constriction, which she describes as feeling like she is not getting enough air. These symptoms have caused her to leave school early. She also experiences travel anxiety, which she believes may be related to her panic attacks.    She has a history of smoking but quit due to worsening asthma. She has tried various medications in her teens and twenties, including Seroquel, but none have been effective. Her therapist suspects she may have Borderline Personality Disorder (BPD), but not bipolar disorder. She has also tried edibles, which she found helpful for her panic attacks without any adverse effects. She has an oximeter at home and occasionally checks her oxygen levels, which sometimes alleviates her anxiety. However, she struggles with swallowing pills and prefers chewable forms. She is reluctant to take Xanax daily.    Results  Laboratory Studies  GeneSight testing  "indicates potential moderate gene-drug interactions with Prozac, venlafaxine, amitriptyline, Paxil, Celexa, Lexapro, Zoloft. The patient may respond better to bupropion, Pristiq, Viibryd, Trintellix, Cymbalta, Remeron. Valium, Ambien may not be suitable for the patient. Antipsychotics like Vraylar, Abilify, Rexulti, Latuda, Seroquel, Risperdal can be used as normal. Mood stabilizers like Lamictal can be used as directed.    Lab Results   Component Value Date    WBC 7.65 08/13/2024    HGB 12.8 08/13/2024    HCT 39.0 08/13/2024    MCV 85.7 08/13/2024     08/13/2024     Lab Results   Component Value Date    GLUCOSE 95 08/13/2024    BUN 11 08/13/2024    CREATININE 0.61 08/13/2024    EGFR 123.5 08/13/2024    BCR 18.0 08/13/2024    K 3.8 08/13/2024    CO2 22.0 08/13/2024    CALCIUM 9.5 08/13/2024    ALBUMIN 4.4 08/13/2024    BILITOT 0.2 08/13/2024    AST 31 08/13/2024    ALT 46 (H) 08/13/2024     Lab Results   Component Value Date    CHOL 182 08/13/2024    TRIG 110 08/13/2024    HDL 32 (L) 08/13/2024     (H) 08/13/2024     Lab Results   Component Value Date    TSH 4.440 (H) 08/13/2024     A1C Last 3 Results          8/13/2024    11:35   HGBA1C Last 3 Results   Hemoglobin A1C 5.40        The following portions of the patient's history were reviewed and updated as appropriate: allergies, current medications, past family history, past medical history, past social history, past surgical history and problem list.              No outpatient medications have been marked as taking for the 8/28/24 encounter (Office Visit) with Dior Bach APRN.     Allergies   Allergen Reactions    Cetirizine Anaphylaxis and Unknown - High Severity           Objective     Vitals:    08/28/24 1558   BP: 124/68   BP Location: Right arm   Patient Position: Sitting   Cuff Size: Large Adult   Pulse: 76   Resp: 18   Temp: 98.4 °F (36.9 °C)   TempSrc: Infrared   SpO2: 97%   Weight: (!) 149 kg (329 lb)   Height: 162.6 cm (64\")     Body " mass index is 56.47 kg/m².  Wt Readings from Last 3 Encounters:   08/28/24 (!) 149 kg (329 lb)   08/13/24 (!) 149 kg (329 lb 3.2 oz)   07/09/24 (!) 149 kg (328 lb)     Physical Exam  Vitals and nursing note reviewed.   Constitutional:       General: She is not in acute distress.     Appearance: Normal appearance. She is well-developed. She is not ill-appearing or diaphoretic.   HENT:      Head: Normocephalic and atraumatic.   Eyes:      General: No scleral icterus.     Conjunctiva/sclera: Conjunctivae normal.   Neck:      Vascular: No JVD.   Cardiovascular:      Rate and Rhythm: Normal rate and regular rhythm.      Chest Wall: PMI is not displaced. No thrill.      Heart sounds: Normal heart sounds. No murmur heard.  Pulmonary:      Effort: Pulmonary effort is normal. No accessory muscle usage or respiratory distress.      Breath sounds: Normal breath sounds.   Chest:      Chest wall: No tenderness.   Abdominal:      General: Bowel sounds are normal. There is no distension.      Palpations: Abdomen is soft.      Tenderness: There is no abdominal tenderness.   Musculoskeletal:         General: Normal range of motion.      Cervical back: Normal range of motion.      Right lower leg: No edema.      Left lower leg: No edema.   Skin:     General: Skin is warm and dry.      Coloration: Skin is not ashen, jaundiced, mottled or pale.      Findings: No erythema.   Neurological:      General: No focal deficit present.      Mental Status: She is alert and oriented to person, place, and time.   Psychiatric:         Attention and Perception: Attention normal.         Mood and Affect: Mood and affect normal.         Behavior: Behavior normal. Behavior is cooperative.         Thought Content: Thought content normal.         Cognition and Memory: Cognition normal.         Judgment: Judgment normal.                   Assessment & Plan   Diagnoses and all orders for this visit:    1. High risk medication use (Primary)  -     Drug  Analysis,Comp,Oral Fluid - Saliva,; Future  -     Drug Analysis,Comp,Oral Fluid - Saliva, Oral Cavity    2. Panic attack  -     ALPRAZolam (Xanax) 0.25 MG tablet; Take 1 tablet by mouth 2 (Two) Times a Day As Needed for Anxiety.  Dispense: 10 tablet; Refill: 0  -     Drug Analysis,Comp,Oral Fluid - Saliva,; Future  -     Drug Analysis,Comp,Oral Fluid - Saliva, Oral Cavity    3. Unspecified mood (affective) disorder  -     lamoTRIgine (LaMICtal) 25 MG chewable tablet chewable tablet; Chew 1 tablet Daily.  Dispense: 30 tablet; Refill: 1  -     Drug Analysis,Comp,Oral Fluid - Saliva,; Future  -     Drug Analysis,Comp,Oral Fluid - Saliva, Oral Cavity    4. Generalized anxiety disorder  -     lamoTRIgine (LaMICtal) 25 MG chewable tablet chewable tablet; Chew 1 tablet Daily.  Dispense: 30 tablet; Refill: 1  -     Drug Analysis,Comp,Oral Fluid - Saliva,; Future  -     Drug Analysis,Comp,Oral Fluid - Saliva, Oral Cavity    5. Post traumatic stress disorder (PTSD)  -     lamoTRIgine (LaMICtal) 25 MG chewable tablet chewable tablet; Chew 1 tablet Daily.  Dispense: 30 tablet; Refill: 1  -     Drug Analysis,Comp,Oral Fluid - Saliva,; Future  -     Drug Analysis,Comp,Oral Fluid - Saliva, Oral Cavity    6. Moderate episode of recurrent major depressive disorder  -     lamoTRIgine (LaMICtal) 25 MG chewable tablet chewable tablet; Chew 1 tablet Daily.  Dispense: 30 tablet; Refill: 1  -     Drug Analysis,Comp,Oral Fluid - Saliva,; Future  -     Drug Analysis,Comp,Oral Fluid - Saliva, Oral Cavity        Assessment & Plan  1. Generalized Anxiety Disorder.  The GeneSight test results were thoroughly reviewed. She has previously tried multiple medications, including Lexapro, bupropion, fluoxetine, venlafaxine, Cymbalta, BuSpar, and hydroxyzine, with limited success. The GeneSight test indicated that medications such as bupropion, Pristiq, Viibryd, Trintellix, Cymbalta, and Remeron might be beneficial, while Prozac, venlafaxine,  amitriptyline, Paxil, Celexa, Lexapro, and Zoloft might require lower doses due to potential high serum levels. She was advised against using Valium and Ambien. The potential benefits of antipsychotics like Vraylar, Abilify, Rexulti, Latuda, Seroquel, and Risperdal were discussed. She was informed about the potential sedative effects of Xanax and advised against the use of edibles or any other marijuana or THC products while on Xanax. A prescription for Lamictal (lamotrigine) chewable tablets will be sent to [facility] pharmacy, starting at a low dose with a plan to gradually increase it. A one-time prescription for 10 pills of Xanax will also be provided for use during severe symptoms or travel. She is to take Xanax only when she feels a panic attack coming on, up to two times a day. A mouth swab drug screen will be conducted today. She will sign paperwork acknowledging her understanding of the risks associated with controlled substances.    2. Panic Disorder.  She reports experiencing panic attacks almost daily, with symptoms including difficulty breathing and tightness in the chest and throat. The potential benefits of Lamictal for mood stabilization and reducing panic attacks were discussed. A one-time prescription for 10 pills of Xanax will be provided for use during severe symptoms or travel. She is to take Xanax only when she feels a panic attack coming on, up to two times a day.    3. Depression.  She has a history of depression and has tried multiple medications with limited success. The [employer] test results indicated that medications such as bupropion, Pristiq, Viibryd, Trintellix, Cymbalta, and Remeron might be beneficial. The potential benefits of Lamictal for mood stabilization were discussed. A prescription for Lamictal (lamotrigine) chewable tablets will be sent  starting at a low dose with a plan to gradually increase it.    4. Post-Traumatic Stress Disorder (PTSD).  She has a history of PTSD and has  tried multiple medications with limited success. The [employer] test results indicated that medications such as bupropion, Pristiq, Viibryd, Trintellix, Cymbalta, and Remeron might be beneficial. The potential benefits of Lamictal for mood stabilization were discussed. A prescription for Lamictal (lamotrigine) chewable tablets will be sent to [facility] pharmacy, starting at a low dose with a plan to gradually increase it.    5. Borderline Personality Disorder (BPD).??  Her therapist believes she has BPD, and she exhibits symptoms such as anger, panic, and pushing people away. The potential benefits of mood stabilizers like Lamictal were discussed. A prescription for Lamictal (lamotrigine) chewable tablets will be sent starting at a low dose with a plan to gradually increase it.    Follow-up  She will follow up in 3 to 4 weeks or sooner if needed.            Return in about 4 weeks (around 9/25/2024) for Recheck.    I discussed my findings,recommendations, and plan of care was with the patient. They verbalized understanding and agreement.  Patient was encouraged to keep me informed of any acute changes, lack of improvement, or any new concerning symptoms.     Patient or patient representative verbalized consent for the use of Ambient Listening during the visit with  EDGARDO Patterson for chart documentation. 9/3/2024  08:46 EDT

## 2024-09-09 ENCOUNTER — HOSPITAL ENCOUNTER (OUTPATIENT)
Dept: SLEEP MEDICINE | Facility: HOSPITAL | Age: 29
Discharge: HOME | End: 2024-09-09
Admitting: INTERNAL MEDICINE
Payer: COMMERCIAL

## 2024-09-09 VITALS — WEIGHT: 293 LBS | BODY MASS INDEX: 50.02 KG/M2 | HEIGHT: 64 IN

## 2024-09-09 DIAGNOSIS — G47.19 EXCESSIVE DAYTIME SLEEPINESS: ICD-10-CM

## 2024-09-09 DIAGNOSIS — G47.33 OBSTRUCTIVE SLEEP APNEA, ADULT: ICD-10-CM

## 2024-09-09 DIAGNOSIS — R06.83 SNORING: ICD-10-CM

## 2024-09-09 PROCEDURE — 95806 SLEEP STUDY UNATT&RESP EFFT: CPT

## 2024-09-12 ENCOUNTER — OFFICE VISIT (OUTPATIENT)
Dept: BEHAVIORAL HEALTH | Facility: CLINIC | Age: 29
End: 2024-09-12
Payer: COMMERCIAL

## 2024-09-12 ENCOUNTER — PATIENT ROUNDING (BHMG ONLY) (OUTPATIENT)
Dept: BARIATRICS/WEIGHT MGMT | Facility: CLINIC | Age: 29
End: 2024-09-12

## 2024-09-12 ENCOUNTER — DOCUMENTATION (OUTPATIENT)
Dept: BARIATRICS/WEIGHT MGMT | Facility: CLINIC | Age: 29
End: 2024-09-12
Payer: COMMERCIAL

## 2024-09-12 ENCOUNTER — OFFICE VISIT (OUTPATIENT)
Dept: BARIATRICS/WEIGHT MGMT | Facility: CLINIC | Age: 29
End: 2024-09-12
Payer: COMMERCIAL

## 2024-09-12 VITALS
HEIGHT: 64 IN | TEMPERATURE: 97.1 F | RESPIRATION RATE: 16 BRPM | WEIGHT: 293 LBS | DIASTOLIC BLOOD PRESSURE: 82 MMHG | SYSTOLIC BLOOD PRESSURE: 128 MMHG | HEART RATE: 70 BPM | BODY MASS INDEX: 50.02 KG/M2 | OXYGEN SATURATION: 98 %

## 2024-09-12 DIAGNOSIS — F60.3 BORDERLINE PERSONALITY DISORDER: ICD-10-CM

## 2024-09-12 DIAGNOSIS — R10.13 DYSPEPSIA: ICD-10-CM

## 2024-09-12 DIAGNOSIS — G47.33 OSA (OBSTRUCTIVE SLEEP APNEA): Primary | ICD-10-CM

## 2024-09-12 DIAGNOSIS — E66.01 MORBID OBESITY WITH BMI OF 50.0-59.9, ADULT: Primary | ICD-10-CM

## 2024-09-12 DIAGNOSIS — G47.30 SLEEP APNEA, UNSPECIFIED TYPE: ICD-10-CM

## 2024-09-12 DIAGNOSIS — R06.83 SNORING: ICD-10-CM

## 2024-09-12 DIAGNOSIS — Z71.89 ENCOUNTER FOR PSYCHOLOGICAL ASSESSMENT PRIOR TO BARIATRIC SURGERY: ICD-10-CM

## 2024-09-12 DIAGNOSIS — K21.9 GASTROESOPHAGEAL REFLUX DISEASE, UNSPECIFIED WHETHER ESOPHAGITIS PRESENT: ICD-10-CM

## 2024-09-12 PROBLEM — O14.90 PREECLAMPSIA: Status: ACTIVE | Noted: 2019-01-01

## 2024-09-12 LAB
6MAM SAL CFM-MCNC: NOT DETECTED NG/ML
6MAM SAL QL CFM: NEGATIVE
ALPRAZ SAL CFM-MCNC: NOT DETECTED NG/ML
AMPHET SAL CFM-MCNC: NOT DETECTED NG/ML
AMPHET SAL QL CFM: NEGATIVE
ANTICONVULSANTS SAL QL CFM: NEGATIVE
BENZODIAZ SAL QL CFM: NEGATIVE
BUPRENORPHINE SAL CFM-MCNC: NOT DETECTED NG/ML
BUPRENORPHINE SAL QL CFM: NEGATIVE
BZE SAL CFM-MCNC: NOT DETECTED NG/ML
CANNABINOIDS SAL QL SCN: NEGATIVE
CARBOXYTHC SAL CFM-MCNC: NOT DETECTED NG/ML
CARISOPRODOL SAL CFM-MCNC: NOT DETECTED NG/ML
CLONAZEPAM SAL CFM-MCNC: NOT DETECTED NG/ML
COC+MET SAL QL CFM: NEGATIVE
COCAINE SAL CFM-MCNC: NOT DETECTED NG/ML
CODEINE SAL CFM-MCNC: NOT DETECTED NG/ML
COTININE SAL CFM-MCNC: NOT DETECTED NG/ML
COTININE SAL QL CFM: NEGATIVE
CYCLOBENZAPRINE SAL CFM-MCNC: NOT DETECTED NG/ML
DHC SAL CFM-MCNC: NOT DETECTED NG/ML
DIAZEPAM SAL CFM-MCNC: NOT DETECTED NG/ML
DULOXETINE SAL CFM-MCNC: NOT DETECTED NG/ML
DULOXETINE SAL QL CFM: NEGATIVE
EDDP SAL QL CFM: NOT DETECTED NG/ML
FENTANYL SAL CFM-MCNC: NEGATIVE NG/ML
FENTANYL SAL QL SCN: NOT DETECTED NG/ML
FLUNITRAZEPAM SAL CFM-MCNC: NOT DETECTED NG/ML
FLURAZEPAM SAL CFM-MCNC: NOT DETECTED NG/ML
GABAPENTIN SAL CFM-MCNC: NOT DETECTED UG/ML
HYDROCODONE SAL CFM-MCNC: NOT DETECTED NG/ML
HYDROMORPHONE SAL CFM-MCNC: NOT DETECTED NG/ML
HYPNOTICS SAL QL CFM: NEGATIVE
LORAZEPAM SAL-MCNC: NOT DETECTED NG/ML
MDMA SAL CFM-MCNC: NOT DETECTED NG/ML
MEPERIDINE SAL CFM-MCNC: NOT DETECTED NG/ML
MEPROBAMATE SAL CFM-MCNC: NOT DETECTED NG/ML
METHADONE SAL CFM-MCNC: NOT DETECTED NG/ML
METHADONE SAL QL CFM: NEGATIVE
METHAMPHET SAL CFM-MCNC: NOT DETECTED NG/ML
MIDAZOLAM SAL CFM-MCNC: NOT DETECTED NG/ML
MORPHINE SAL CFM-MCNC: NOT DETECTED NG/ML
MUSCLE RELAXANTS: NEGATIVE
NARCOTICS SAL QL CFM: NEGATIVE
NORBUPRENORPHINE SAL CFM-MCNC: NOT DETECTED NG/ML
NORDIAZEPAM SAL-MCNC: NOT DETECTED NG/ML
NORHYDROCODONE SAL CFM-MCNC: NOT DETECTED NG/ML
NOROXYCODONE SAL CFM-MCNC: NOT DETECTED NG/ML
OPIATES SAL QL CFM: NEGATIVE
OXAZEPAM SAL CFM-MCNC: NOT DETECTED NG/ML
OXYCODONE SAL CFM-MCNC: NOT DETECTED NG/ML
OXYCODONE SAL QL CFM: NEGATIVE
OXYMORPHONE SAL CFM-MCNC: NOT DETECTED NG/ML
PCP SAL CFM-MCNC: NOT DETECTED NG/ML
PCP SAL QL CFM: NEGATIVE
PREGABALIN SAL CFM-MCNC: NOT DETECTED UG/ML
PROPOXYPH SAL CFM-MCNC: NOT DETECTED NG/ML
TAPENTADOL SAL CFM-MCNC: NOT DETECTED NG/ML
TAPENTADOL SAL QL SCN: NEGATIVE
TEMAZEPAM SAL CFM-MCNC: NOT DETECTED NG/ML
TRAMADOL SAL CFM-MCNC: NOT DETECTED NG/ML
TRIAZOLAM SAL CFM-MCNC: NOT DETECTED NG/ML
ZOLPIDEM SAL CFM-MCNC: NOT DETECTED NG/ML

## 2024-09-12 PROCEDURE — 1160F RVW MEDS BY RX/DR IN RCRD: CPT | Performed by: PHYSICIAN ASSISTANT

## 2024-09-12 PROCEDURE — 90791 PSYCH DIAGNOSTIC EVALUATION: CPT | Performed by: PSYCHOLOGIST

## 2024-09-12 PROCEDURE — 1159F MED LIST DOCD IN RCRD: CPT | Performed by: PHYSICIAN ASSISTANT

## 2024-09-12 PROCEDURE — 99204 OFFICE O/P NEW MOD 45 MIN: CPT | Performed by: PHYSICIAN ASSISTANT

## 2024-09-12 NOTE — PROGRESS NOTES
A [employer] message has been sent to the patient for PATIENT ROUNDING for [facility] [place] [facility] [place] [facility].

## 2024-09-12 NOTE — PROGRESS NOTES
PROGRESS NOTE    Data:    Lilly Segovia is a 28 y.o. female who met with the undersigned for a scheduled psychological evaluation from 9:45 - 10:30 am.      Clinical Maneuvering/Intervention:      Chief complaint and history of presenting illness/Problems: struggling with obesity for several years. Despite trying different weight loss plans and diets, the pt reported being unsuccessful in losing weight. A psychological evaluation was conducted in order to assess past and current level of functioning. Areas assessed included, but were not limited to: perception of social support, perception of ability to face and deal with challenges in life (positive functioning), anxiety symptoms, depressive symptoms, perspective on beliefs/belief system, coping skills for stress, intelligence level, addiction issues, etc. Therapeutic rapport was established. Interventions conducted today were geared towards assessing the pt's readiness for weight loss surgery and identifying and psychological contraindications for undergoing such a major life change. Social support was deemed strong (specific to weight loss surgery/weight loss in this manner and in a general sense). Current psychological struggles were described as low, but included BPD like symptoms (dependency, unstable sense of self, black and white thinking, etc) and frustrations with being overweight ( being overweight. She is in therapy and works on managing mood in that way as well as taking psychotropic medications. Coping skills for distress and related to undergoing a major life change such as weight loss surgery/weight loss were deemed strong and included asking for help as appropriate (weight loss surgery, psychotherapy, psychotropic medication), makes a point to do quality work, tends to be a responsible person,   maintains quality relationships with others, and makes a point to learn what will help her be successful with weight loss surgery. The pt endorsed having  characteristics of readiness to undergo major life changes inherent in the journey of weight loss surgery. She could speak to having 'suffered enough,' and the decision to have weight loss surgery has 'clicked' for her. The pt expressed gratitude for today's visit.     Past Family and Social History:      History of family mental health problems: mother (substance abuse)    Psychosocial history: treatment of psychiatric care in the past (N/A), alcohol/substance abuse treatment in the past (N/A) , alcohol/substance abuse problems (N/A), inpatient psychiatric care (N/A).    Mental Status Exam (MSE):  Hygiene:  good  Dress: normal  Attitude:  cooperative and proactive  Motor Activity: normal  Speech: normal  Mood:   nervous, but hopeful  Affect:  congruent  Thought Processes: normal  Thought Content:  normal  Suicidal Thoughts:  not endorsed  Homicidal Thoughts:  not endorsed  Crisis Safety Plan: not needed   Hallucinations:  none      Patient's Support Network Includes:  family, friends      Progress toward goal: there is evidence to suggest that she is taking measures to improve the quality of her life including seeking weight loss surgery      Functional Status: moderate to high      Prognosis: good with weight loss surgery    Evaluation, Diagnoses, and Ability/Capacity to Respond to Treatment:      The pt presented to be struggling with BPD, pressured speech and frustrations with being overweight (BMI = 56.13, morbid obesity). Results of MSE demonstrated a functional status of moderate to high. Strengths: belief in self that she will be successful with weight loss surgery, etc (see detailed list of coping skills above). Needed for growth (CPT code requirement for Weaknesses): weight loss.      From a psychological standpoint, the pt presents as a good candidate for bariatric surgery. She is motivated for the surgery, has showed readiness for the lifestyle change in terms of starting to adjust her eating habits, and  seems to have appropriate expectations of how to prepare and how to live after surgery in order to lose weight successfully.    Treatment Plan:      Short term goals: Start improving her health by following up with her bariatric surgeon in order to receive weight loss surgery as soon as feasible/appropriate and demonstrate success with compliance to adhering to the recommended diet. Long term goals: reach a healthy weight and experience overall improve ability to regulate mood via taking control over her health.    Serenity Sawant, PhD, LP

## 2024-09-12 NOTE — PROGRESS NOTES
"Weight Loss Surgery  Presurgical Nutrition Assessment     Lilly Segovia  09/12/2024  [id number]  [id number]  1995   female    Surgery desired: LSG (sleeve gastrectomy)     HEIGHT: 162.6 cm (64\")   WEIGHT: 148 kg (32 7 #)   BMI: 56.13    Highest weight ever: 152 kg (335 #)      Allergies   Allergen Reactions    Cetirizine Anaphylaxis and Unknown - High Severity       Current Outpatient Medications:     ALPRAZolam (Xanax) 0.25 MG tablet, Take 1 tablet by mouth 2 (Two) Times a Day As Needed for Anxiety., Disp: 10 tablet, Rfl: 0    escitalopram (LEXAPRO) 10 MG tablet, TAKE 1 & 1/2 TABLET BY MOUTH ONCE DAILY (Patient not taking: Reported on 9/12/2024), Disp: 45 tablet, Rfl: 2    fluticasone (Flonase) 50 MCG/ACT nasal spray, 2 sprays into the nostril(s) as directed by provider Daily., Disp: 16 g, Rfl: 11    lamoTRIgine (LaMICtal) 25 MG chewable tablet chewable tablet, Chew 1 tablet Daily., Disp: 30 tablet, Rfl: 1    levothyroxine (SYNTHROID, LEVOTHROID) 125 MCG tablet, Take 1 tablet by mouth Daily. (Patient taking differently: Take 112 mcg by mouth Daily.), Disp: 30 tablet, Rfl: 3    naproxen (Naprosyn) 500 MG tablet, Take 1 tablet by mouth 2 (Two) Times a Day As Needed for Mild Pain (mid cycle pain)., Disp: 10 tablet, Rfl: 2    omeprazole (priLOSEC) 40 MG capsule, TAKE 1 CAPSULE BY MOUTH ONCE DAILY, Disp: 30 capsule, Rfl: 5    ProAir  (90 Base) MCG/ACT inhaler, INHALE 2 PUFFS BY MOUTH EVERY 4 HOURS AS NEEDED FOR WHEEZING OR SHORTNESS OF AIR, Disp: 8.5 g, Rfl: 2    QUEtiapine (SEROquel) 25 MG tablet, TAKE 1/2 TO 1 TABLET BY MOUTH TWICE DAILY AS NEEDED FOR ANXIETY/PANIC, Disp: 60 tablet, Rfl: 1    rizatriptan MLT (MAXALT-MLT) 5 MG disintegrating tablet, Place 1 tablet on the tongue 1 (One) Time As Needed for Migraine. May repeat in 2 hours if needed, Disp: 9 tablet, Rfl: 2    topiramate (Topamax) 25 MG tablet, Take 1 tablet by mouth Every Night., Disp: 30 tablet, Rfl: 1      Subjective information: Nutrition " consult with patient today.  Noted that boyfriend is overweight and is supportive of her having surgery, but has said he does not want to diet with her.        Nutrition Recall   Example of Usual 24 hour intake:     Breakfast: usually nothing to eat OR a [employer] protein shake OR 2 [employer] sandwiches.     Lunch: leftovers OR burgers, salads, sandwich, tacos OR soup    Dinner: Prepared at home:  pasta with salad & met.  Also a starch and a veggie.  Fried food such as fish, OR tacos    Snacks:only snacks on chips - savory not sweet snacks.  The only fruit she eats is bananas.  She does not snack late at night or during the sleeping hours.     Beverages of Choice: primarily water.  No soda, juices, lemonade.  Drinks ~8 oz milk per day    Food Allergies or Intolerances: none stated or recorded          Exercise / Activity: no current personal planned activity program       Assessment of Nutritional Adequacy, Excessive Intake or Deficiencies:        Protein intake is  not sufficient to ensure successful weight loss.                                         Processed / simple Carbohydrate intake is: high due to bread and pasta - patient does not snack on sweets                                        Balance of diet with a variety of fruits and vegetables is: fair to poor. Only banana + one vegetable with dinner                                                       Reliance on restaurant food including fast food is: eats out twice weekly                                                                          Ingestion of sweet beverages eg soda, sweet tea, fruit juice: not a problem       Education    Provided Nutrition Guidelines for Bariatric and Metabolic Surgery   Reviewed guidelines for higher protein, limited carbohydrate diet to promote weight loss. Demonstrated means of counting protein and carbohydrate grams.   Educated patient to wisely choose an appropriate protein supplement beverage for the post-surgery  liquid diet.  Provided product guidelines and examples.    Explained importance of goal setting to help in changing eating behaviors that are not conducive to weight loss.  Specific macronutrient goals as below.   Provided follow-up options for support, including contact information for dietitians here.     Discussed importance of tracking grams of protein and carbohydrate in diet.  Web-based support information and apps for smart phones and computers given.        Nutrition Goals   Protein goal: 100 grams per day in three regular balanced meals and two to three high protein snacks each day, to ensure desired weight loss.   Carbohydrate goal:  100-140 grams per day  Beverage goal: Appropriate non-carbonated beverage intake.  Patient to wean self off of any sweet beverages, including soda.    Exercise Goals  Continue current exercise routine, if appropriate, and obtain approval from caregiver if physically limited for any reason.   Start activity plan per PCP/specialist advice if not currently exercising.     Recommend that team proceed with surgery and follow per protocol.   Zoe Crawford RD  09/12/2024  09:01 EDT

## 2024-09-12 NOTE — PROGRESS NOTES
[facility] GROUP BARIATRIC SURGERY  [address]  [address]  [facility] [id number] [phone]      Patient  Name:  Lilly Segovia  :  1995      Date of Visit: 2024      Chief Complaint:  weight gain; unable to maintain weight loss      History of Present Illness:  Lilly Segovia is a 28 y.o. female who presents today for evaluation, education and consultation regarding metabolic and bariatric surgery with Dr. Combs.     Lilly has been overweight for at least 12 years, has been 35 pounds or more overweight for at least 12 years, has been 100 pounds or more overweight for 5 or more years and started dieting at age 19.  Previous diet attempts include: High Protein, Low Carbohydrate, Low Fat, Calorie Counting, Jamel's Diet, Fasting, and Slim Fast; Overeaters Anonymous.  The most weight Lilly lost was 20 pounds but was unable to maintain that weight loss.  Her maximum lifetime weight is 335 pounds.       Complete history has been obtained and discussed today, as pertinent to metabolic/ bariatric surgery.     Past Medical History:   Diagnosis Date    ADHD (attention deficit hyperactivity disorder)     Asthma     COVID 02/15/2022    Dyspepsia     Dysphagia     w/ meds (must crush)    Dysthymic disorder 2021    Formatting of this note might be different from the original.  Saw counselors and took medications as a teenager. Was in a mental hospital at one point. Unsure what the diagnoses were. States all medications (lexapro, paxil, prozac) made her feel like a zombie.  In early , tried to take citalopram liquid for 1 week, had side effects (felt drugged up). Stopped medication. Feels like anxiety com    Fatigue     Gallstones     CT 2023    GERD (gastroesophageal reflux disease)     on PPI, denies prior eval    Hypothyroidism     Kidney agenesis 2021    Formatting of this note might be different from the original.  Went to ER in 2021 for pain, had CT scan which  "showed lack of a kidney.    Migraine 03/21/2019    Topamax w/ prn Tylenol + caffeine    Postoperative nausea     \"extreme\"    Preeclampsia 2019    PTSD (post-traumatic stress disorder) 12/01/2020    Formatting of this note might be different from the original.  Prior panic attacks.  Went on trip last month and had some anxiety over it.  Worried about a possible COVID exposure but, did not come down with it.   Feels like her anxiety is getting worse.  She is not working, she's at home all day with her daughter and the dog.  She feels like her anxiety is turning into rage, like she needs to yel    Sleep apnea     eval (P)     Past Surgical History:   Procedure Laterality Date    COLONOSCOPY N/A 03/23/2023    Procedure: COLONOSCOPY;  Surgeon: Eder Fragoso MD;  Location: [facility]COPY;  Service: Gastroenterology;  Laterality: N/A;    WISDOM TOOTH EXTRACTION N/A 2010       Allergies   Allergen Reactions    Cetirizine Anaphylaxis and Unknown - High Severity       Current Outpatient Medications:     ALPRAZolam (Xanax) 0.25 MG tablet, Take 1 tablet by mouth 2 (Two) Times a Day As Needed for Anxiety., Disp: 10 tablet, Rfl: 0    fluticasone (Flonase) 50 MCG/ACT nasal spray, 2 sprays into the nostril(s) as directed by provider Daily., Disp: 16 g, Rfl: 11    lamoTRIgine (LaMICtal) 25 MG chewable tablet chewable tablet, Chew 1 tablet Daily., Disp: 30 tablet, Rfl: 1    levothyroxine (SYNTHROID, LEVOTHROID) 125 MCG tablet, Take 1 tablet by mouth Daily. (Patient taking differently: Take 112 mcg by mouth Daily.), Disp: 30 tablet, Rfl: 3    naproxen (Naprosyn) 500 MG tablet, Take 1 tablet by mouth 2 (Two) Times a Day As Needed for Mild Pain (mid cycle pain)., Disp: 10 tablet, Rfl: 2    ProAir  (90 Base) MCG/ACT inhaler, INHALE 2 PUFFS BY MOUTH EVERY 4 HOURS AS NEEDED FOR WHEEZING OR SHORTNESS OF AIR, Disp: 8.5 g, Rfl: 2    QUEtiapine (SEROquel) 25 MG tablet, TAKE 1/2 TO 1 TABLET BY MOUTH TWICE DAILY AS " NEEDED FOR ANXIETY/PANIC, Disp: 60 tablet, Rfl: 1    rizatriptan MLT (MAXALT-MLT) 5 MG disintegrating tablet, Place 1 tablet on the tongue 1 (One) Time As Needed for Migraine. May repeat in 2 hours if needed, Disp: 9 tablet, Rfl: 2    topiramate (Topamax) 25 MG tablet, Take 1 tablet by mouth Every Night., Disp: 30 tablet, Rfl: 1    escitalopram (LEXAPRO) 10 MG tablet, TAKE 1 & 1/2 TABLET BY MOUTH ONCE DAILY (Patient not taking: Reported on 9/12/2024), Disp: 45 tablet, Rfl: 2    levothyroxine (SYNTHROID, LEVOTHROID) 112 MCG tablet, TAKE 1 TABLET BY MOUTH ONCE DAILY, Disp: 30 tablet, Rfl: 1    omeprazole (priLOSEC) 40 MG capsule, TAKE 1 CAPSULE BY MOUTH ONCE DAILY, Disp: 30 capsule, Rfl: 5    Social History     Socioeconomic History    Marital status: Single    Number of children: 1    Highest education level: High school graduate   Tobacco Use    Smoking status: Former     Current packs/day: 0.00     Average packs/day: 0.5 packs/day for 6.0 years (3.0 ttl pk-yrs)     Types: Cigarettes     Start date: 2008     Quit date: 1/1/2014     Years since quitting: 10.7     Passive exposure: Past    Smokeless tobacco: Never   Vaping Use    Vaping status: Never Used   Substance and Sexual Activity    Alcohol use: Not Currently     Alcohol/week: 1.0 - 3.0 standard drink of alcohol     Types: 1 - 3 Glasses of wine per week    Drug use: Not Currently     Types: Marijuana    Sexual activity: Yes     Partners: Male     Birth control/protection: None     Social History     Social History Narrative    Lives in [place], [place].  Single w/ one child. Student.       Family History   Problem Relation Age of Onset    Anxiety disorder Mother     Depression Mother     Hypertension Mother     Diabetes Mother     Diverticulitis Mother     Pancreatitis Mother     Cholelithiasis Mother     Anxiety disorder Maternal Grandmother     Depression Maternal Grandmother     Obesity Maternal Grandmother     Diabetes Maternal Grandmother     Hypertension  Maternal Grandmother     Heart disease Maternal Grandmother 70        cause of death    Sleep apnea Maternal Grandmother     Kidney failure Maternal Grandmother     Depression Maternal Aunt     Anxiety disorder Maternal Aunt        Review of Systems:  Constitutional:  reports fatigue, weight gain and denies fevers, chills.  HEENT:  denies headache, ear pain or loss of hearing, blurred or double vision, nasal discharge or sore throat.  Cardiovascular:  denies HTN, hx heart disease, chest pain, palpitations, hx DVT.  Respiratory:  reports poss sleep apnea, asthma and denies cough , wheezing, hx PE.  Gastrointestinal:  reports dysphagia, heartburn, gallbladder issues and denies nausea, vomiting, abdominal pain, IBS.  Genitourinary:  reports solitary kidney and denies history of  frequent UTI, incontinence, hematuria, dysuria, polyuria, polydipsia, renal insufficiency.    Musculoskeletal:   denies fibromyalgia and arthritis.  Neurological:   denies numbness /tingling, dizziness, confusion, seizure.  Psychiatric:  reports hx depression, hx anxiety and denies bipolar disorder.  Endocrine:  reports thyroid disease and denies PCOS, endometriosis, diabetes.  Hematologic:  denies bruising, bleeding disorder, hx anemia, hx blood transfusion.  Skin:   denies rashes, hx MRSA.    Physical Exam:  Vital Signs:  Weight: (!) 148 kg (327 lb)   Body mass index is 56.13 kg/m².  Temp: 97.1 °F (36.2 °C)   Heart Rate: 70   BP: 128/82     Physical Exam  Vitals reviewed.   Constitutional:       Appearance: She is well-developed.   HENT:      Head: Normocephalic and atraumatic.   Eyes:      General: No scleral icterus.     Conjunctiva/sclera: Conjunctivae normal.   Neck:      Thyroid: No thyromegaly.   Cardiovascular:      Rate and Rhythm: Normal rate and regular rhythm.      Heart sounds: No murmur heard.  Pulmonary:      Effort: Pulmonary effort is normal. No respiratory distress.      Breath sounds: Normal breath sounds. No wheezing or  rales.   Abdominal:      General: Bowel sounds are normal. There is no distension.      Palpations: Abdomen is soft. There is no mass.      Tenderness: There is no abdominal tenderness.      Hernia: No hernia is present.      Comments: no surgical scars   Musculoskeletal:         General: Normal range of motion.      Cervical back: Neck supple.   Skin:     General: Skin is warm and dry.      Findings: No rash.   Neurological:      Mental Status: She is alert and oriented to person, place, and time.      Gait: Gait normal.   Psychiatric:         Judgment: Judgment normal.         Patient Active Problem List   Diagnosis    GERD (gastroesophageal reflux disease)    Asthma, mild    Hypothyroidism    Congenital absence of one kidney    PTSD (post-traumatic stress disorder)    Seasonal allergies    Well woman exam with routine gynecological exam    Panic attacks    Bloody stool    BMI 50.0-59.9, adult    Gallstones    Sleep apnea    Dysphagia    Postoperative nausea    Preeclampsia    Fatigue       Assessment:  28 y.o. female with medically complicated obesity pursuing sleeve gastrectomy.    Metabolic & Bariatric Surgery is deemed medically necessary given the following:  Class 3 Severe Obesity (BMI >=40). Obesity-related health conditions include the following: obstructive sleep apnea, GERD, migraines, asthma, hypothyroidism, dysthymic disorder. Obesity is worsening. BMI is is above average; BMI management plan is completed. We discussed consulting a Bariatric surgeon.        Plan:  Further evaluation will include: CBC, CMP, Lipids, TSH, HgA1C, H.Pylori, GBUS, and EGD.    Additional clearances needed prior to surgery will include: Cardiology and Pulmonary.     Patient understands that bariatric surgery is not cosmetic surgery but rather a tool to help make a lifelong commitment to lifestyle changes including diet, exercise and behavior modifications.  The patient has been educated today on those expected postoperative  lifestyle changes.  Psychological and Nutritional consultations will be completed prior to surgery.  Instructions on how to access [facility] (an internet based site w/ educational surgical videos) were given to the patient.  Recommended perioperative vitamin supplementation was reviewed.  The importance of avoiding ASA/ NSAIDS/ steroids/ tobacco/nicotine/ hormones/ immunomodulators perioperatively was discussed in detail.  All questions/concerns have been addressed.      Further input to follow pending the above.           SRINIVAS Crawford

## 2024-09-14 LAB — UREA BREATH TEST QL: NEGATIVE

## 2024-09-16 DIAGNOSIS — E03.9 ACQUIRED HYPOTHYROIDISM: ICD-10-CM

## 2024-09-16 DIAGNOSIS — K21.9 GASTROESOPHAGEAL REFLUX DISEASE, UNSPECIFIED WHETHER ESOPHAGITIS PRESENT: ICD-10-CM

## 2024-09-16 RX ORDER — OMEPRAZOLE 40 MG/1
CAPSULE, DELAYED RELEASE ORAL
Qty: 30 CAPSULE | Refills: 5 | Status: SHIPPED | OUTPATIENT
Start: 2024-09-16

## 2024-09-16 RX ORDER — LEVOTHYROXINE SODIUM 112 UG/1
TABLET ORAL
Qty: 30 TABLET | Refills: 1 | Status: SHIPPED | OUTPATIENT
Start: 2024-09-16

## 2024-09-17 ENCOUNTER — TELEPHONE (OUTPATIENT)
Dept: SLEEP MEDICINE | Facility: CLINIC | Age: 29
End: 2024-09-17
Payer: COMMERCIAL

## 2024-09-23 ENCOUNTER — TELEPHONE (OUTPATIENT)
Dept: SLEEP MEDICINE | Age: 29
End: 2024-09-23

## 2024-09-23 NOTE — TELEPHONE ENCOUNTER
Caller: SOPHIA JOYNER     Relationship to Patient: SELF    Phone Number:     [phone]       Reason for Call: THE PT WILL BE PICKING UP HER CPAP MACHINE ON 10/07/24 AND SHE NEEDS TO HAVE HER FOLLOW UP APPT 30-45 DAYS AFTER PER HER INSURANCE. PLEASE ADVISE.

## 2024-09-30 RX ORDER — SODIUM CHLORIDE 9 MG/ML
40 INJECTION, SOLUTION INTRAVENOUS AS NEEDED
OUTPATIENT
Start: 2024-09-30

## 2024-09-30 RX ORDER — SODIUM CHLORIDE 0.9 % (FLUSH) 0.9 %
3-10 SYRINGE (ML) INJECTION AS NEEDED
OUTPATIENT
Start: 2024-09-30

## 2024-09-30 RX ORDER — SODIUM CHLORIDE 0.9 % (FLUSH) 0.9 %
3 SYRINGE (ML) INJECTION EVERY 12 HOURS SCHEDULED
OUTPATIENT
Start: 2024-09-30

## 2024-09-30 RX ORDER — SODIUM CHLORIDE 9 MG/ML
150 INJECTION, SOLUTION INTRAVENOUS CONTINUOUS
OUTPATIENT
Start: 2024-09-30

## 2024-10-03 ENCOUNTER — TELEPHONE (OUTPATIENT)
Dept: INTERNAL MEDICINE | Facility: CLINIC | Age: 29
End: 2024-10-03
Payer: COMMERCIAL

## 2024-10-03 NOTE — TELEPHONE ENCOUNTER
Samantha from [facility] called stating that we should have an order here that needs signed and a letter with it to addend. She said it was faxed over.    Phone number [phone]

## 2024-10-04 DIAGNOSIS — J45.20 MILD INTERMITTENT ASTHMA WITHOUT COMPLICATION: ICD-10-CM

## 2024-10-07 RX ORDER — ALBUTEROL SULFATE 90 UG/1
AEROSOL, METERED RESPIRATORY (INHALATION)
Qty: 18 G | Refills: 0 | Status: SHIPPED | OUTPATIENT
Start: 2024-10-07

## 2024-10-07 NOTE — TELEPHONE ENCOUNTER
1 day PO: Patient is doing well post-operatively. The importance of post-op drop compliance was emphasized. Drop schedule reviewed with patient. Patient to call if any visual changes or concerns. Rx Refill Note  Requested Prescriptions     Pending Prescriptions Disp Refills    Ventolin  (90 Base) MCG/ACT inhaler [Pharmacy Med Name: VENTOLIN HFA 90 MCG INHALER 108 (90 BAS Aerosol] 18 g 0     Sig: INHALE 2 PUFFS BY MOUTH EVERY 4 TO 6 HOURS AS NEEDED FOR SHORTNESS OF BREATH      Last office visit with prescribing clinician: 8/28/2024     Next office visit with prescribing clinician: 10/8/2024       Dlela Shane  10/07/24, 11:49 EDT

## 2024-10-08 ENCOUNTER — OFFICE VISIT (OUTPATIENT)
Dept: INTERNAL MEDICINE | Facility: CLINIC | Age: 29
End: 2024-10-08
Payer: COMMERCIAL

## 2024-10-08 VITALS
WEIGHT: 293 LBS | DIASTOLIC BLOOD PRESSURE: 86 MMHG | RESPIRATION RATE: 18 BRPM | OXYGEN SATURATION: 99 % | HEART RATE: 92 BPM | SYSTOLIC BLOOD PRESSURE: 132 MMHG | TEMPERATURE: 97.5 F | HEIGHT: 64 IN | BODY MASS INDEX: 50.02 KG/M2

## 2024-10-08 DIAGNOSIS — F33.1 MODERATE EPISODE OF RECURRENT MAJOR DEPRESSIVE DISORDER: ICD-10-CM

## 2024-10-08 DIAGNOSIS — F41.1 GENERALIZED ANXIETY DISORDER: Primary | ICD-10-CM

## 2024-10-08 DIAGNOSIS — Z23 IMMUNIZATION DUE: ICD-10-CM

## 2024-10-08 DIAGNOSIS — F41.0 PANIC ATTACK: ICD-10-CM

## 2024-10-08 DIAGNOSIS — F43.10 POST TRAUMATIC STRESS DISORDER (PTSD): ICD-10-CM

## 2024-10-08 PROCEDURE — 90656 IIV3 VACC NO PRSV 0.5 ML IM: CPT | Performed by: NURSE PRACTITIONER

## 2024-10-08 PROCEDURE — 91320 SARSCV2 VAC 30MCG TRS-SUC IM: CPT | Performed by: NURSE PRACTITIONER

## 2024-10-08 PROCEDURE — 1160F RVW MEDS BY RX/DR IN RCRD: CPT | Performed by: NURSE PRACTITIONER

## 2024-10-08 PROCEDURE — 90480 ADMN SARSCOV2 VAC 1/ONLY CMP: CPT | Performed by: NURSE PRACTITIONER

## 2024-10-08 PROCEDURE — 1125F AMNT PAIN NOTED PAIN PRSNT: CPT | Performed by: NURSE PRACTITIONER

## 2024-10-08 PROCEDURE — 99214 OFFICE O/P EST MOD 30 MIN: CPT | Performed by: NURSE PRACTITIONER

## 2024-10-08 PROCEDURE — 1159F MED LIST DOCD IN RCRD: CPT | Performed by: NURSE PRACTITIONER

## 2024-10-08 PROCEDURE — 90471 IMMUNIZATION ADMIN: CPT | Performed by: NURSE PRACTITIONER

## 2024-10-08 RX ORDER — LAMOTRIGINE 25 MG/1
25 TABLET ORAL 2 TIMES DAILY
Qty: 60 TABLET | Refills: 1 | Status: SHIPPED | OUTPATIENT
Start: 2024-10-08

## 2024-10-08 NOTE — PROGRESS NOTES
Lilly Segovia presents to [facility] for     Chief Complaint  Chief Complaint   Patient presents with    Anxiety     4 week recheck      Depression    PTSD       PCP:  Dior Bach APRN    Subjective        History of Present Illness    Lilly is a 28-year-old female who is here today to follow-up on her anxiety.  She stopped taking her Lexapro.  She has had some shortness of breath for the last 2 weeks and anxiety is worsening.  She has taken Xanax a couple of times feels like this really helps her.  Since she leaves her house she notices that she feels extremely anxious and on edge.  She reports that she has only been taking about half of her levothyroxine as well.  She denies any HI/SI.  She is tolerating the Lamictal fairly well. However she has had a couple panic attacks since she has been on it.  She has previously tried and failed multiple medications including Lexapro, bupropion, fluoxetine, venlafaxine, Cymbalta, BuSpar, and hydroxyzine.  We did complete Gene[employer] testing indicating medication such as bupropion, Pristiq, Viibryd, Trintellix, Cymbalta, and Remeron might be beneficial  She has been referred to behavioral health but has not seen them yet  Diet: eating less, trying to   Exercise: walking some when cooler out.       Health Maintenance:   Health Maintenance   Topic Date Due    ANNUAL PHYSICAL  08/13/2025    PAP SMEAR  08/22/2025    BMI FOLLOWUP  09/12/2025    TDAP/TD VACCINES (5 - Td or Tdap) 11/05/2029    HEPATITIS C SCREENING  Completed    COVID-19 Vaccine  Completed    Pneumococcal Vaccine 0-64  Completed    INFLUENZA VACCINE  Completed         Allergies   Allergen Reactions    Cetirizine Anaphylaxis and Unknown - High Severity     Current Outpatient Medications on File Prior to Visit   Medication Sig Dispense Refill    fluticasone (Flonase) 50 MCG/ACT nasal spray 2 sprays into the nostril(s) as directed by provider Daily. 16 g 11    levothyroxine  "(SYNTHROID, LEVOTHROID) 112 MCG tablet TAKE 1 TABLET BY MOUTH ONCE DAILY 30 tablet 1    omeprazole (priLOSEC) 40 MG capsule TAKE 1 CAPSULE BY MOUTH ONCE DAILY 30 capsule 5    rizatriptan MLT (MAXALT-MLT) 5 MG disintegrating tablet Place 1 tablet on the tongue 1 (One) Time As Needed for Migraine. May repeat in 2 hours if needed 9 tablet 2    Ventolin  (90 Base) MCG/ACT inhaler INHALE 2 PUFFS BY MOUTH EVERY 4 TO 6 HOURS AS NEEDED FOR SHORTNESS OF BREATH 18 g 0     No current facility-administered medications on file prior to visit.         The following portions of the patient's history were reviewed and updated as appropriate: allergies, current medications, past family history, past medical history, past social history, past surgical history and problem list and are available for review within electronic record    Objective     Result Review :                    Vital Signs:   /86 (BP Location: Right arm, Patient Position: Sitting, Cuff Size: Large Adult)   Pulse 92   Temp 97.5 °F (36.4 °C) (Infrared)   Resp 18   Ht 162.6 cm (64\")   Wt (!) 151 kg (332 lb 9.6 oz)   SpO2 99%   BMI 57.09 kg/m²         Physical Exam  Constitutional:       Appearance: Normal appearance. She is well-developed.   HENT:      Head: Normocephalic and atraumatic.   Eyes:      General: No scleral icterus.     Conjunctiva/sclera: Conjunctivae normal.   Cardiovascular:      Rate and Rhythm: Normal rate and regular rhythm.      Heart sounds: Normal heart sounds.   Pulmonary:      Effort: Pulmonary effort is normal. No respiratory distress.      Breath sounds: Normal breath sounds.   Abdominal:      General: Bowel sounds are normal.      Palpations: Abdomen is soft.      Tenderness: There is no abdominal tenderness.   Musculoskeletal:         General: Normal range of motion.      Cervical back: Normal range of motion.      Right lower leg: No edema.      Left lower leg: No edema.   Skin:     General: Skin is warm and dry. "   Neurological:      General: No focal deficit present.      Mental Status: She is alert and oriented to person, place, and time.   Psychiatric:         Attention and Perception: Attention normal.         Mood and Affect: Affect normal. Mood is anxious.         Behavior: Behavior normal. Behavior is cooperative.         Thought Content: Thought content normal.         Cognition and Memory: Cognition normal.         Judgment: Judgment normal.                 Assessment and Plan      Diagnoses and all orders for this visit:    1. Generalized anxiety disorder (Primary)  -     lamoTRIgine (LaMICtal) 25 MG tablet; Take 1 tablet by mouth 2 (Two) Times a Day.  Dispense: 60 tablet; Refill: 1    2. Post traumatic stress disorder (PTSD)  -     lamoTRIgine (LaMICtal) 25 MG tablet; Take 1 tablet by mouth 2 (Two) Times a Day.  Dispense: 60 tablet; Refill: 1    3. Moderate episode of recurrent major depressive disorder  -     lamoTRIgine (LaMICtal) 25 MG tablet; Take 1 tablet by mouth 2 (Two) Times a Day.  Dispense: 60 tablet; Refill: 1    4. Panic attack  -     lamoTRIgine (LaMICtal) 25 MG tablet; Take 1 tablet by mouth 2 (Two) Times a Day.  Dispense: 60 tablet; Refill: 1    5. Immunization due  -     Fluzone >6mos (0053-0521)  -     COVID-19 (Pfizer) 12yrs+ (COMIRNATY)    She is due for COVID and flu vaccines will update those today.  We will increase her Lamictal to twice a day.  She does have a few doses of Xanax that she can use sparingly.  We do not want to turn this into a chronic use medication for her if avoidable.  Jeremy reviewed via PDMP and is appropriate  Recommend that she see behavioral health that she has tried and failed multiple medications and anxiety symptoms are still severe.  I will continue to follow her in the interim until she can get in with behavioral health for medication management.  Referral was previously placed.      Follow Up     Patient was given instructions and counseling regarding her  condition or for health maintenance advice. Please see specific information pulled into the AVS if appropriate.   Any new medication's adverse effects have been discussed in detail with patient  Patient was encouraged to keep me informed of any acute changes, lack of improvement, or any new concerning symptoms.    Return in about 4 weeks (around 11/5/2024) for Recheck.          Dictated Utilizing Dragon Dictation   Please note that portions of this note were completed with a voice recognition program.   Part of this note may be an electronic transcription/translation of spoken language to printed text using the Dragon Dictation System.

## 2024-10-16 ENCOUNTER — OFFICE VISIT (OUTPATIENT)
Dept: CARDIOLOGY | Facility: CLINIC | Age: 29
End: 2024-10-16
Payer: COMMERCIAL

## 2024-10-16 ENCOUNTER — TELEPHONE (OUTPATIENT)
Dept: INTERNAL MEDICINE | Facility: CLINIC | Age: 29
End: 2024-10-16
Payer: COMMERCIAL

## 2024-10-16 ENCOUNTER — TELEPHONE (OUTPATIENT)
Dept: INTERNAL MEDICINE | Facility: CLINIC | Age: 29
End: 2024-10-16

## 2024-10-16 VITALS
HEIGHT: 64 IN | BODY MASS INDEX: 50.02 KG/M2 | WEIGHT: 293 LBS | HEART RATE: 79 BPM | SYSTOLIC BLOOD PRESSURE: 134 MMHG | OXYGEN SATURATION: 97 % | DIASTOLIC BLOOD PRESSURE: 78 MMHG

## 2024-10-16 DIAGNOSIS — F41.1 GENERALIZED ANXIETY DISORDER: Chronic | ICD-10-CM

## 2024-10-16 DIAGNOSIS — F41.1 GENERALIZED ANXIETY DISORDER: Primary | ICD-10-CM

## 2024-10-16 DIAGNOSIS — F41.0 PANIC ATTACK: ICD-10-CM

## 2024-10-16 DIAGNOSIS — F43.10 POST TRAUMATIC STRESS DISORDER (PTSD): Chronic | ICD-10-CM

## 2024-10-16 DIAGNOSIS — Z01.810 PREOPERATIVE CARDIOVASCULAR EXAMINATION: Primary | ICD-10-CM

## 2024-10-16 DIAGNOSIS — F43.10 POST TRAUMATIC STRESS DISORDER (PTSD): ICD-10-CM

## 2024-10-16 DIAGNOSIS — F33.1 MODERATE EPISODE OF RECURRENT MAJOR DEPRESSIVE DISORDER: ICD-10-CM

## 2024-10-16 DIAGNOSIS — F33.1 MODERATE EPISODE OF RECURRENT MAJOR DEPRESSIVE DISORDER: Chronic | ICD-10-CM

## 2024-10-16 DIAGNOSIS — F41.0 PANIC DISORDER (EPISODIC PAROXYSMAL ANXIETY): Chronic | ICD-10-CM

## 2024-10-16 DIAGNOSIS — G43.009 MIGRAINE WITHOUT AURA AND WITHOUT STATUS MIGRAINOSUS, NOT INTRACTABLE: ICD-10-CM

## 2024-10-16 RX ORDER — ALPRAZOLAM 0.25 MG/1
0.25 TABLET ORAL 2 TIMES DAILY PRN
Qty: 10 TABLET | Refills: 0 | Status: SHIPPED | OUTPATIENT
Start: 2024-10-16

## 2024-10-16 NOTE — TELEPHONE ENCOUNTER
I don't see any documentation in note from 8/28 regarding wrist. Note from 10/8 is not completed. Please advise.

## 2024-10-16 NOTE — TELEPHONE ENCOUNTER
Called and spoke with [facility], she states that the providers note need to include the following:     Lilly presents with bilateral carpal tunnel syndrome   Or   Lilly requires bilateral wrist hand orthoses to reduce pain and inflammation    Please add to note if appropriate.

## 2024-10-16 NOTE — TELEPHONE ENCOUNTER
Caller: Lilly Segovia    Relationship: Self    Best call back number: [phone]    Which medication are you concerned about: lamoTRIgine (LaMICtal) 25 MG tablet     Who prescribed you this medication: ANDERS LOPEZ    When did you start taking this medication: LAST WEEK    What are your concerns: PATIENT STATES THAT SHE FEELS WORSE SINCE TAKING 2X DAILY. PATIENT STATES THAT SHE HAS BEEN PANICKING BAD AND IS REQUESTING A PRESCRIPTION FOR ALPRAZolam (Xanax) 0.25 MG tablet.     How long have you had these concerns: A FEW DAYS AFTER STARTING NEW DOSAGE

## 2024-10-16 NOTE — TELEPHONE ENCOUNTER
ANALI WITH [facility] [phone].PLEASE GIVE A CALL THEY NEED TO KNOW  IF ANDERS MADE ADDENDUM TO RECENT CHART NOTE REGARDING CYST ON WRIST.

## 2024-10-16 NOTE — TELEPHONE ENCOUNTER
Pt has been notified. Verbalized good understanding. Stated that she had been trying to see someone with Behavioral health but currently no availability. She is unsure where she can call and get scheduled.

## 2024-10-16 NOTE — TELEPHONE ENCOUNTER
Have her take them Lamictal back down to once a day.  I will send in a little more Xanax for her to use on an as-needed basis.  I have also placed a referral to behavioral health as we will need to get her to start seeing them for medication management

## 2024-10-16 NOTE — PROGRESS NOTES
[facility] Cardiology  [address], [address]  [place], [place], [address]    [phone]  [email or web address]           OUTPATIENT CLINIC CONSULTATION NOTE    Patient care team:  Patient Care Team:  Dior Bach APRN as PCP - General (Nurse Practitioner)  Michele Killian MD as Consulting Physician (Sleep Medicine)  Mabel Osorio CNM as Midwife (Certified Nurse Midwife)  Sundeep Cuello MD as Consulting Physician (Cardiology)    Requesting Provider and Reason for consultation: The patient is being seen today at the request of SRINIVAS Crawford for preoperative cardiac risk assessment.     Subjective:   Chief complaint:   Chief Complaint   Patient presents with    Cardiac Clearance     New Patient         Lilly Segovia is a 28 y.o. female.  Cardiac focused problem list:  GERD  Sleep apnea   Hypothyroidism   Congenital absence of one kidney   ADHD  Migraines   Gallstones     HPI:    Patient presents today in consultation for preoperative cardiac risk assessment.  She is undergoing evaluation for bariatric surgery.  No prior personal cardiac history.      Does have some family history in her maternal grandmother who had an MI and CABG in her 40s.  History of diabetes in her mother.  Health history of her father is unknown.  Patient is a former smoker x 8 years, quit 9 years ago.  Occasional alcohol use, approximately once a month.  Denies drug use.      No regular exercise.  However she is able to perform daily activities without cardiopulmonary symptoms.  Has some exertional dyspnea possibly related to deconditioning, obesity.    Review of Systems:  As noted above in the HPI    PFSH:  Patient Active Problem List   Diagnosis    GERD (gastroesophageal reflux disease)    Asthma, mild    Hypothyroidism    Congenital absence of one kidney    PTSD (post-traumatic stress disorder)    Seasonal allergies    Well woman exam with routine gynecological exam    Panic attacks     Bloody stool    BMI 50.0-59.9, adult    Gallstones    Sleep apnea    Dysphagia    Postoperative nausea    Preeclampsia    Fatigue         Current Outpatient Medications:     fluticasone (Flonase) 50 MCG/ACT nasal spray, 2 sprays into the nostril(s) as directed by provider Daily., Disp: 16 g, Rfl: 11    lamoTRIgine (LaMICtal) 25 MG tablet, Take 1 tablet by mouth 2 (Two) Times a Day., Disp: 60 tablet, Rfl: 1    levothyroxine (SYNTHROID, LEVOTHROID) 112 MCG tablet, TAKE 1 TABLET BY MOUTH ONCE DAILY, Disp: 30 tablet, Rfl: 1    omeprazole (priLOSEC) 40 MG capsule, TAKE 1 CAPSULE BY MOUTH ONCE DAILY, Disp: 30 capsule, Rfl: 5    QUEtiapine (SEROquel) 25 MG tablet, TAKE 1/2 TO 1 TABLET BY MOUTH TWICE DAILY AS NEEDED FOR ANXIETY/PANIC, Disp: 60 tablet, Rfl: 1    rizatriptan MLT (MAXALT-MLT) 5 MG disintegrating tablet, Place 1 tablet on the tongue 1 (One) Time As Needed for Migraine. May repeat in 2 hours if needed, Disp: 9 tablet, Rfl: 2    Ventolin  (90 Base) MCG/ACT inhaler, INHALE 2 PUFFS BY MOUTH EVERY 4 TO 6 HOURS AS NEEDED FOR SHORTNESS OF BREATH, Disp: 18 g, Rfl: 0    ALPRAZolam (Xanax) 0.25 MG tablet, Take 1 tablet by mouth 2 (Two) Times a Day As Needed for Anxiety., Disp: 10 tablet, Rfl: 0    Allergies   Allergen Reactions    Cetirizine Anaphylaxis and Unknown - High Severity       Social History     Socioeconomic History    Marital status: Single    Number of children: 1    Highest education level: High school graduate   Tobacco Use    Smoking status: Former     Current packs/day: 0.00     Average packs/day: 0.5 packs/day for 6.0 years (3.0 ttl pk-yrs)     Types: Cigarettes     Start date: 1/1/2008     Quit date: 1/1/2014     Years since quitting: 10.7     Passive exposure: Past    Smokeless tobacco: Never   Vaping Use    Vaping status: Never Used   Substance and Sexual Activity    Alcohol use: Not Currently     Alcohol/week: 1.0 - 3.0 standard drink of alcohol     Types: 1 - 3 Glasses of wine per week     "Drug use: Not Currently     Types: Marijuana    Sexual activity: Yes     Partners: Male     Birth control/protection: None     Family History   Problem Relation Age of Onset    Anxiety disorder Mother     Depression Mother     Hypertension Mother     Diabetes Mother     Diverticulitis Mother     Pancreatitis Mother     Cholelithiasis Mother     Asthma Mother     Anxiety disorder Maternal Grandmother     Depression Maternal Grandmother     Obesity Maternal Grandmother     Diabetes Maternal Grandmother     Hypertension Maternal Grandmother     Heart disease Maternal Grandmother 70        cause of death    Sleep apnea Maternal Grandmother     Kidney failure Maternal Grandmother     Depression Maternal Aunt     Anxiety disorder Maternal Aunt          Objective:   Physical Exam:  /78 (BP Location: Right arm, Patient Position: Sitting, Cuff Size: Large Adult)   Pulse 79   Ht 162.6 cm (64\")   Wt (!) 149 kg (329 lb)   SpO2 97%   BMI 56.47 kg/m²   CONSTITUTIONAL: No acute distress  RESPIRATORY: Normal effort. Clear to auscultation bilaterally without wheezing or rales  CARDIOVASCULAR: Regular rate and rhythm with normal S1 and S2. Without murmur.  PERIPHERAL VASCULAR: No carotid bruit bilaterally.  Normal radial pulse. There is no lower extremity edema bilaterally.    Labs:  Labs reviewed by myself    Lab Results   Component Value Date    CHOL 182 08/13/2024     Lab Results   Component Value Date    TRIG 110 08/13/2024     Lab Results   Component Value Date    HDL 32 (L) 08/13/2024     Lab Results   Component Value Date     (H) 08/13/2024     No components found for: \"LDLDIRECTC\"    Diagnostic Data:      ECG 12 Lead    Date/Time: 10/16/2024 4:28 PM  Performed by: Kelly Franklin APRN    Authorized by: Kelly Franklin APRN  Previous ECG: no previous ECG available  Rhythm: sinus rhythm  Rate: normal  BPM: 71  Conduction: conduction normal              Assessment and Plan:     Preoperative cardiovascular " examination  Obesity  GERD  Anxiety   -Does have some risk factors for coronary disease.  However she has active without cardiac symptoms.  -Regularly performing greater than 4 metabolic equivalents without symptoms.  -EKG unremarkable today.  -Okay to proceed with bariatric surgery from cardiac standpoint.  No additional cardiac testing recommended at this time.  -Discussed efforts towards risk factor lifestyle modification including heart healthy diet, weight reduction, regular exercise and blood pressure and cholesterol management.      - Return if symptoms worsen or fail to improve.    Electronically signed by EDGARDO Leos, 10/16/24, 4:24 PM EDT.

## 2024-10-17 NOTE — TELEPHONE ENCOUNTER
LVM for patient to discuss. Need to know where she has tried for behavioral health so I do not send the referral to the same place

## 2024-10-17 NOTE — TELEPHONE ENCOUNTER
Rx Refill Note  Requested Prescriptions     Pending Prescriptions Disp Refills    QUEtiapine (SEROquel) 25 MG tablet [Pharmacy Med Name: QUETIAPINE FUMARATE 25 MG T 25 Tablet] 60 tablet 1     Sig: TAKE 1/2 TO 1 TABLET BY MOUTH TWICE DAILY AS NEEDED FOR ANXIETY/PANIC    topiramate (TOPAMAX) 25 MG tablet [Pharmacy Med Name: TOPIRAMATE 25 MG TABS 25 Tablet] 30 tablet 1     Sig: TAKE 1 TABLET BY MOUTH ONCE DAILY AT BEDTIME      Last office visit with prescribing clinician: 10/8/2024   Last telemedicine visit with prescribing clinician: Visit date not found   Next office visit with prescribing clinician: 11/6/2024       Joanne Maciel MA  10/17/24, 08:08 EDT

## 2024-10-18 RX ORDER — TOPIRAMATE 25 MG/1
25 TABLET, FILM COATED ORAL
Qty: 30 TABLET | Refills: 1 | OUTPATIENT
Start: 2024-10-18

## 2024-10-18 RX ORDER — QUETIAPINE FUMARATE 25 MG/1
TABLET, FILM COATED ORAL
Qty: 60 TABLET | Refills: 1 | Status: SHIPPED | OUTPATIENT
Start: 2024-10-18

## 2024-10-31 ENCOUNTER — HOSPITAL ENCOUNTER (EMERGENCY)
Facility: HOSPITAL | Age: 29
Discharge: HOME | End: 2024-10-31
Attending: EMERGENCY MEDICINE
Payer: COMMERCIAL

## 2024-10-31 ENCOUNTER — APPOINTMENT (OUTPATIENT)
Dept: GENERAL RADIOLOGY | Facility: HOSPITAL | Age: 29
End: 2024-10-31
Payer: COMMERCIAL

## 2024-10-31 VITALS
DIASTOLIC BLOOD PRESSURE: 77 MMHG | HEART RATE: 98 BPM | WEIGHT: 293 LBS | BODY MASS INDEX: 50.02 KG/M2 | TEMPERATURE: 98.4 F | RESPIRATION RATE: 20 BRPM | HEIGHT: 64 IN | SYSTOLIC BLOOD PRESSURE: 126 MMHG | OXYGEN SATURATION: 98 %

## 2024-10-31 DIAGNOSIS — R07.9 CHEST PAIN IN ADULT: Primary | ICD-10-CM

## 2024-10-31 LAB
ALBUMIN SERPL-MCNC: 4.3 G/DL (ref 3.5–5.2)
ALBUMIN/GLOB SERPL: 1.6 G/DL
ALP SERPL-CCNC: 75 U/L (ref 39–117)
ALT SERPL W P-5'-P-CCNC: 76 U/L (ref 1–33)
ANION GAP SERPL CALCULATED.3IONS-SCNC: 13 MMOL/L (ref 5–15)
AST SERPL-CCNC: 53 U/L (ref 1–32)
BASOPHILS # BLD AUTO: 0.05 10*3/MM3 (ref 0–0.2)
BASOPHILS NFR BLD AUTO: 0.6 % (ref 0–1.5)
BILIRUB SERPL-MCNC: 0.2 MG/DL (ref 0–1.2)
BUN SERPL-MCNC: 12 MG/DL (ref 6–20)
BUN/CREAT SERPL: 19 (ref 7–25)
CALCIUM SPEC-SCNC: 9.5 MG/DL (ref 8.6–10.5)
CHLORIDE SERPL-SCNC: 103 MMOL/L (ref 98–107)
CO2 SERPL-SCNC: 22 MMOL/L (ref 22–29)
CREAT SERPL-MCNC: 0.63 MG/DL (ref 0.57–1)
D DIMER PPP FEU-MCNC: <0.27 MCGFEU/ML (ref 0–0.5)
DEPRECATED RDW RBC AUTO: 39.9 FL (ref 37–54)
EGFRCR SERPLBLD CKD-EPI 2021: 122.6 ML/MIN/1.73
EOSINOPHIL # BLD AUTO: 0.06 10*3/MM3 (ref 0–0.4)
EOSINOPHIL NFR BLD AUTO: 0.7 % (ref 0.3–6.2)
ERYTHROCYTE [DISTWIDTH] IN BLOOD BY AUTOMATED COUNT: 13.2 % (ref 12.3–15.4)
GLOBULIN UR ELPH-MCNC: 2.7 GM/DL
GLUCOSE SERPL-MCNC: 119 MG/DL (ref 65–99)
HCT VFR BLD AUTO: 36.7 % (ref 34–46.6)
HGB BLD-MCNC: 12.5 G/DL (ref 12–15.9)
HOLD SPECIMEN: NORMAL
IMM GRANULOCYTES # BLD AUTO: 0.03 10*3/MM3 (ref 0–0.05)
IMM GRANULOCYTES NFR BLD AUTO: 0.3 % (ref 0–0.5)
LIPASE SERPL-CCNC: 34 U/L (ref 13–60)
LYMPHOCYTES # BLD AUTO: 2.97 10*3/MM3 (ref 0.7–3.1)
LYMPHOCYTES NFR BLD AUTO: 32.9 % (ref 19.6–45.3)
MCH RBC QN AUTO: 28.2 PG (ref 26.6–33)
MCHC RBC AUTO-ENTMCNC: 34.1 G/DL (ref 31.5–35.7)
MCV RBC AUTO: 82.8 FL (ref 79–97)
MONOCYTES # BLD AUTO: 0.61 10*3/MM3 (ref 0.1–0.9)
MONOCYTES NFR BLD AUTO: 6.7 % (ref 5–12)
NEUTROPHILS NFR BLD AUTO: 5.32 10*3/MM3 (ref 1.7–7)
NEUTROPHILS NFR BLD AUTO: 58.8 % (ref 42.7–76)
NRBC BLD AUTO-RTO: 0 /100 WBC (ref 0–0.2)
NT-PROBNP SERPL-MCNC: <36 PG/ML (ref 0–450)
PLATELET # BLD AUTO: 253 10*3/MM3 (ref 140–450)
PMV BLD AUTO: 10.2 FL (ref 6–12)
POTASSIUM SERPL-SCNC: 3.9 MMOL/L (ref 3.5–5.2)
PROT SERPL-MCNC: 7 G/DL (ref 6–8.5)
QT INTERVAL: 356 MS
QTC INTERVAL: 426 MS
RBC # BLD AUTO: 4.43 10*6/MM3 (ref 3.77–5.28)
SODIUM SERPL-SCNC: 138 MMOL/L (ref 136–145)
TROPONIN T SERPL HS-MCNC: <6 NG/L
WBC NRBC COR # BLD AUTO: 9.04 10*3/MM3 (ref 3.4–10.8)
WHOLE BLOOD HOLD COAG: NORMAL
WHOLE BLOOD HOLD SPECIMEN: NORMAL

## 2024-10-31 PROCEDURE — 93005 ELECTROCARDIOGRAM TRACING: CPT | Performed by: EMERGENCY MEDICINE

## 2024-10-31 PROCEDURE — 36415 COLL VENOUS BLD VENIPUNCTURE: CPT

## 2024-10-31 PROCEDURE — 83880 ASSAY OF NATRIURETIC PEPTIDE: CPT | Performed by: EMERGENCY MEDICINE

## 2024-10-31 PROCEDURE — 99284 EMERGENCY DEPT VISIT MOD MDM: CPT

## 2024-10-31 PROCEDURE — 80053 COMPREHEN METABOLIC PANEL: CPT | Performed by: EMERGENCY MEDICINE

## 2024-10-31 PROCEDURE — 85379 FIBRIN DEGRADATION QUANT: CPT | Performed by: EMERGENCY MEDICINE

## 2024-10-31 PROCEDURE — 84484 ASSAY OF TROPONIN QUANT: CPT | Performed by: EMERGENCY MEDICINE

## 2024-10-31 PROCEDURE — 93005 ELECTROCARDIOGRAM TRACING: CPT

## 2024-10-31 PROCEDURE — 83690 ASSAY OF LIPASE: CPT | Performed by: EMERGENCY MEDICINE

## 2024-10-31 PROCEDURE — 85025 COMPLETE CBC W/AUTO DIFF WBC: CPT

## 2024-10-31 PROCEDURE — 71045 X-RAY EXAM CHEST 1 VIEW: CPT

## 2024-10-31 RX ORDER — ASPIRIN 81 MG/1
324 TABLET, CHEWABLE ORAL ONCE
Status: COMPLETED | OUTPATIENT
Start: 2024-10-31 | End: 2024-10-31

## 2024-10-31 RX ORDER — ALPRAZOLAM 0.25 MG/1
0.25 TABLET ORAL ONCE
Status: COMPLETED | OUTPATIENT
Start: 2024-10-31 | End: 2024-10-31

## 2024-10-31 RX ORDER — SODIUM CHLORIDE 0.9 % (FLUSH) 0.9 %
10 SYRINGE (ML) INJECTION AS NEEDED
Status: DISCONTINUED | OUTPATIENT
Start: 2024-10-31 | End: 2024-11-01 | Stop reason: HOSPADM

## 2024-10-31 RX ADMIN — ASPIRIN 81 MG 324 MG: 81 TABLET ORAL at 22:18

## 2024-10-31 RX ADMIN — ALPRAZOLAM 0.25 MG: 0.25 TABLET ORAL at 22:18

## 2024-11-01 NOTE — DISCHARGE INSTRUCTIONS
A clear cause for your chest pain was not identified in the emergency room tonight. I recommend you take Tylenol 650 mg every 6 hours and ibuprofen 400 mg every 6 hours as needed for pain unless you have a contraindication to these medications  I recommend following up with the chest pain clinic and referral to this clinic was placed, they should call you in the next 48 hours to schedule follow-up appointment.  Return to the ER as needed for new or worsening symptoms

## 2024-11-01 NOTE — ED PROVIDER NOTES
[place]    EMERGENCY DEPARTMENT ENCOUNTER      Pt Name: Lilly Segovia  MRN: [id number]  YOB: 1995  Date of evaluation: 10/31/2024  Provider: Fabrice Greenwood MD    CHIEF COMPLAINT       Chief Complaint   Patient presents with    Chest Pain         HISTORY OF PRESENT ILLNESS   Lilly Segovia is a 28 y.o. female who presents to the emergency department for evaluation of chest pain.  Patient states that started 3 days ago, no clear inciting event.  Described as an aching sensation in her right chest.  It radiates to her back around her shoulder blades.  She has a little bit of difficulty breathing that is associated.  She does not have any fevers or chills or cough.  There was no injuries.  She has never had symptoms like this before.  No positional nature or pleuritic nature to the pain.  It has been constant and not waxing or waning over the past 3 days since it started.    REVIEW OF SYSTEMS     ROS:  A chief complaint appropriate review of systems was completed and is negative except as noted in the HPI.      PAST MEDICAL HISTORY     Past Medical History:   Diagnosis Date    ADHD (attention deficit hyperactivity disorder)     Asthma     COVID 02/15/2022    Dyspepsia     Dysphagia     w/ meds (must crush)    Dysthymic disorder 05/03/2021    Formatting of this note might be different from the original.  Saw counselors and took medications as a teenager. Was in a mental hospital at one point. Unsure what the diagnoses were. States all medications (lexapro, paxil, prozac) made her feel like a zombie.  In early 2020, tried to take citalopram liquid for 1 week, had side effects (felt drugged up). Stopped medication. Feels like anxiety com    Fatigue     Gallstones     CT 1/2023    GERD (gastroesophageal reflux disease)     on PPI, denies prior eval    Hypertension     Hypothyroidism     Kidney agenesis 04/19/2021    Formatting of this note might be different from the original.  Went to ER in April 2021 for  "pain, had CT scan which showed lack of a kidney.    Migraine 03/21/2019    Topamax w/ prn Tylenol + caffeine    Postoperative nausea     \"extreme\"    Preeclampsia 2019    PTSD (post-traumatic stress disorder) 12/01/2020    Formatting of this note might be different from the original.  Prior panic attacks.  Went on trip last month and had some anxiety over it.  Worried about a possible COVID exposure but, did not come down with it.   Feels like her anxiety is getting worse.  She is not working, she's at home all day with her daughter and the dog.  She feels like her anxiety is turning into rage, like she needs to yel    Sleep apnea     eval (P)         SURGICAL HISTORY       Past Surgical History:   Procedure Laterality Date    COLONOSCOPY N/A 03/23/2023    Procedure: COLONOSCOPY;  Surgeon: Eder Fragoso MD;  Location: [facility] ENDOSCOPY;  Service: Gastroenterology;  Laterality: N/A;    WISDOM TOOTH EXTRACTION N/A 2010         CURRENT MEDICATIONS     No current facility-administered medications for this encounter.    Current Outpatient Medications:     ALPRAZolam (Xanax) 0.25 MG tablet, Take 1 tablet by mouth 2 (Two) Times a Day As Needed for Anxiety., Disp: 10 tablet, Rfl: 0    fluticasone (Flonase) 50 MCG/ACT nasal spray, 2 sprays into the nostril(s) as directed by provider Daily., Disp: 16 g, Rfl: 11    lamoTRIgine (LaMICtal) 25 MG tablet, Take 1 tablet by mouth 2 (Two) Times a Day., Disp: 60 tablet, Rfl: 1    levothyroxine (SYNTHROID, LEVOTHROID) 112 MCG tablet, TAKE 1 TABLET BY MOUTH ONCE DAILY, Disp: 30 tablet, Rfl: 1    omeprazole (priLOSEC) 40 MG capsule, TAKE 1 CAPSULE BY MOUTH ONCE DAILY, Disp: 30 capsule, Rfl: 5    QUEtiapine (SEROquel) 25 MG tablet, TAKE 1/2 TO 1 TABLET BY MOUTH TWICE DAILY AS NEEDED FOR ANXIETY/PANIC, Disp: 60 tablet, Rfl: 1    rizatriptan MLT (MAXALT-MLT) 5 MG disintegrating tablet, Place 1 tablet on the tongue 1 (One) Time As Needed for Migraine. May repeat in 2 hours if " needed, Disp: 9 tablet, Rfl: 2    Ventolin  (90 Base) MCG/ACT inhaler, INHALE 2 PUFFS BY MOUTH EVERY 4 TO 6 HOURS AS NEEDED FOR SHORTNESS OF BREATH, Disp: 18 g, Rfl: 0    ALLERGIES     Cetirizine    FAMILY HISTORY       Family History   Problem Relation Age of Onset    Anxiety disorder Mother     Depression Mother     Hypertension Mother     Diabetes Mother     Diverticulitis Mother     Pancreatitis Mother     Cholelithiasis Mother     Asthma Mother     Anxiety disorder Maternal Grandmother     Depression Maternal Grandmother     Obesity Maternal Grandmother     Diabetes Maternal Grandmother     Hypertension Maternal Grandmother     Heart disease Maternal Grandmother 70        cause of death    Sleep apnea Maternal Grandmother     Kidney failure Maternal Grandmother     Depression Maternal Aunt     Anxiety disorder Maternal Aunt           SOCIAL HISTORY       Social History     Socioeconomic History    Marital status: Single    Number of children: 1    Highest education level: High school graduate   Tobacco Use    Smoking status: Former     Current packs/day: 0.00     Average packs/day: 0.5 packs/day for 6.0 years (3.0 ttl pk-yrs)     Types: Cigarettes     Start date: 1/1/2008     Quit date: 1/1/2014     Years since quitting: 10.8     Passive exposure: Past    Smokeless tobacco: Never   Vaping Use    Vaping status: Never Used   Substance and Sexual Activity    Alcohol use: Not Currently     Alcohol/week: 1.0 - 3.0 standard drink of alcohol     Types: 1 - 3 Glasses of wine per week    Drug use: Not Currently     Types: Marijuana    Sexual activity: Yes     Partners: Male     Birth control/protection: None         PHYSICAL EXAM    (up to 7 for level 4, 8 or more for level 5)     Vitals:    10/31/24 2120 10/31/24 2217 10/31/24 2230   BP: (!) 147/103 139/93 126/77   BP Location: Left arm     Patient Position: Sitting     Pulse: 98 88 98   Resp: 20     Temp: 98.4 °F (36.9 °C)     TempSrc: Oral     SpO2: 97% 98%  "98%   Weight: (!) 149 kg (328 lb 7.8 oz)     Height: 162.6 cm (64\")         Physical Exam  Constitutional:       General: She is not in acute distress.     Appearance: She is obese.   HENT:      Head: Normocephalic and atraumatic.   Eyes:      Conjunctiva/sclera: Conjunctivae normal.      Pupils: Pupils are equal, round, and reactive to light.   Cardiovascular:      Rate and Rhythm: Normal rate and regular rhythm.      Pulses: Normal pulses.      Heart sounds: No murmur heard.     No gallop.   Pulmonary:      Effort: Pulmonary effort is normal. No respiratory distress.      Breath sounds: No wheezing or rales.   Chest:      Chest wall: No tenderness.   Abdominal:      General: Abdomen is flat. There is no distension.      Tenderness: There is no abdominal tenderness.   Musculoskeletal:         General: No swelling or deformity. Normal range of motion.      Right lower leg: No edema.      Left lower leg: No edema.   Skin:     General: Skin is warm and dry.      Capillary Refill: Capillary refill takes less than 2 seconds.   Neurological:      General: No focal deficit present.      Mental Status: She is alert and oriented to person, place, and time.   Psychiatric:         Mood and Affect: Mood normal.         Behavior: Behavior normal.            DIAGNOSTIC RESULTS     EKG: All EKGs are interpreted by the Emergency Department Physician who either signs or Co-signs this chart in the absence of a cardiologist.    ECG 12 Lead ED Triage Standing Order; Chest Pain   Final Result   Test Reason : ED Triage Standing Order~   Blood Pressure :   */*   mmHG   Vent. Rate :  86 BPM     Atrial Rate :  86 BPM      P-R Int : 172 ms          QRS Dur :  82 ms       QT Int : 356 ms       P-R-T Axes :  60  52  33 degrees      QTc Int : 426 ms      Normal sinus rhythm   Normal ECG   No previous ECGs available   Confirmed by MD SMITH KYLE ([id number]) on 10/31/2024 10:35:10 PM      Referred By: EDMD           Confirmed By: HANSEL SMITH MD "            RADIOLOGY:   [x] Radiologist's Report Reviewed:  XR Chest 1 View   Final Result   Impression:   No acute process         Electronically Signed: Fransisco King MD     10/31/2024 10:17 PM EDT     Workstation ID: [id number]          I ordered and independently reviewed the above noted radiographic studies.        LABS:  I independently interpreted all laboratory studies conducted during this ED visit.  The results of these studies can be seen below and my independent interpretation in the ED course      EMERGENCY DEPARTMENT COURSE and DIFFERENTIAL DIAGNOSIS/MDM:   Vitals:  AS OF 02:45 EDT    BP - 126/77  HR - 98  TEMP - 98.4 °F (36.9 °C) (Oral)  O2 SATS - 98%        Discussion below represents my analysis of pertinent findings related to patient's condition, differential diagnosis, treatment plan and final disposition.      Differential diagnosis:  The differential diagnosis associated with the patient's presentation includes: Acute coronary syndrome, musculoskeletal pain, pulmonary embolism, pneumonia, pneumothorax.  Lab and imaging studies conducted.      Independent interpretations (ECG/rhythm strip/X-ray/US/CT scan): See ED course      Additional sources:  Discussed/obtained information from independent historians:   [] Spouse:   [] Parent:   [] Friend:   [] EMS:   [] Other:    External record review:  10/16/2024 reviewed provider note from cardiology clinic documenting patient's history, cardiovascular risk factors, clearance for bariatric surgery      Patient's care impacted by:   [] Diabetes   [x] Hypertension   [] Coronary Artery Disease   [] Cancer   [x] Other: Obesity, anxiety    Care significantly affected by Social Determinants of Health (housing and economic circumstances, unemployment)    [] Yes     [x] No   If yes, Patient's care significantly limited by  Social Determinants of Health including:    [] Inadequate housing    [] Low income    [] Alcoholism and drug addiction in family    []  Problems related to primary support group    [] Unemployment    [] Problems related to employment    [] Other Social Determinants of Health:     I considered prescription management with: Anti-inflammatories   [] Pain medication:   [] Antiviral:   [] Antibiotic:   [] Other:      ED Course:    ED Course as of 11/01/24 0245   Thu Oct 31, 2024   3741 Twelve-lead ECG independently interpreted by myself demonstrates normal sinus rhythm, no ST segment elevation or depression, normal axis. [KB]   2317 Chest x-ray independently interpreted by myself demonstrates no acute cardiopulmonary abnormalities [KB]   2317 Laboratory workup independently interpreted by myself notable only for mildly elevated liver enzymes, negative D-dimer negative troponin are noted. [KB]   2317 Given patient's steady chest pain for the past 3 days I do not believe there is an indication for repeat troponin and we have adequately evaluated the patient for acute myocardial ischemia at this time. [KB]      ED Course User Index  [KB] Fabrice Greenwood MD         Patient reevaluated in the emergency department, continues to be overall well-appearing with normal vital signs.  We discussed her lab and imaging studies, potential diagnoses that would not be identified on the studies including pleurisy, pericarditis, costochondritis or muscular pain.  I believe patient is appropriate for outpatient management at this time and she is agreeable to this plan.  She was counseled on follow-up in the chest pain clinic, return precautions to the ER and discharged in good condition    I had a discussion with the patient/family regarding diagnosis, diagnostic results, treatment plan, and medications.  The patient/family indicated understanding of these instructions.  I spent adequate time at the bedside preceding discharge necessary to personally discuss the aftercare instructions, giving patient education, providing explanations of the results of our  evaluations/findings, and my decision making to assure that the patient/family understand the plan of care.  Time was allotted to answer questions at that time and throughout the ED course.             FINAL IMPRESSION      1. Chest pain in adult          DISPOSITION/PLAN     ED Disposition       ED Disposition   Discharge    Condition   Stable    Comment   --                 Comment: Please note this report has been produced using speech recognition software.      Fabrice Greenwood MD  Attending Emergency Physician    Recent Results (from the past 24 hours)   ECG 12 Lead ED Triage Standing Order; Chest Pain    Collection Time: 10/31/24  9:26 PM   Result Value Ref Range    QT Interval 356 ms    QTC Interval 426 ms   High Sensitivity Troponin T    Collection Time: 10/31/24  9:41 PM    Specimen: Blood   Result Value Ref Range    HS Troponin T <6 <14 ng/L   Comprehensive Metabolic Panel    Collection Time: 10/31/24  9:41 PM    Specimen: Blood   Result Value Ref Range    Glucose 119 (H) 65 - 99 mg/dL    BUN 12 6 - 20 mg/dL    Creatinine 0.63 0.57 - 1.00 mg/dL    Sodium 138 136 - 145 mmol/L    Potassium 3.9 3.5 - 5.2 mmol/L    Chloride 103 98 - 107 mmol/L    CO2 22.0 22.0 - 29.0 mmol/L    Calcium 9.5 8.6 - 10.5 mg/dL    Total Protein 7.0 6.0 - 8.5 g/dL    Albumin 4.3 3.5 - 5.2 g/dL    ALT (SGPT) 76 (H) 1 - 33 U/L    AST (SGOT) 53 (H) 1 - 32 U/L    Alkaline Phosphatase 75 39 - 117 U/L    Total Bilirubin 0.2 0.0 - 1.2 mg/dL    Globulin 2.7 gm/dL    A/G Ratio 1.6 g/dL    BUN/Creatinine Ratio 19.0 7.0 - 25.0    Anion Gap 13.0 5.0 - 15.0 mmol/L    eGFR 122.6 >60.0 mL/min/1.73   Lipase    Collection Time: 10/31/24  9:41 PM    Specimen: Blood   Result Value Ref Range    Lipase 34 13 - 60 U/L   BNP    Collection Time: 10/31/24  9:41 PM    Specimen: Blood   Result Value Ref Range    proBNP <36.0 0.0 - 450.0 pg/mL   Green Top (Gel)    Collection Time: 10/31/24  9:41 PM   Result Value Ref Range    Extra Tube Hold for add-ons.     Lavender Top    Collection Time: 10/31/24  9:41 PM   Result Value Ref Range    Extra Tube hold for add-on    Gold Top - SST    Collection Time: 10/31/24  9:41 PM   Result Value Ref Range    Extra Tube Hold for add-ons.    Gray Top    Collection Time: 10/31/24  9:41 PM   Result Value Ref Range    Extra Tube Hold for add-ons.    Light Blue Top    Collection Time: 10/31/24  9:41 PM   Result Value Ref Range    Extra Tube Hold for add-ons.    CBC Auto Differential    Collection Time: 10/31/24  9:41 PM    Specimen: Blood   Result Value Ref Range    WBC 9.04 3.40 - 10.80 10*3/mm3    RBC 4.43 3.77 - 5.28 10*6/mm3    Hemoglobin 12.5 12.0 - 15.9 g/dL    Hematocrit 36.7 34.0 - 46.6 %    MCV 82.8 79.0 - 97.0 fL    MCH 28.2 26.6 - 33.0 pg    MCHC 34.1 31.5 - 35.7 g/dL    RDW 13.2 12.3 - 15.4 %    RDW-SD 39.9 37.0 - 54.0 fl    MPV 10.2 6.0 - 12.0 fL    Platelets 253 140 - 450 10*3/mm3    Neutrophil % 58.8 42.7 - 76.0 %    Lymphocyte % 32.9 19.6 - 45.3 %    Monocyte % 6.7 5.0 - 12.0 %    Eosinophil % 0.7 0.3 - 6.2 %    Basophil % 0.6 0.0 - 1.5 %    Immature Grans % 0.3 0.0 - 0.5 %    Neutrophils, Absolute 5.32 1.70 - 7.00 10*3/mm3    Lymphocytes, Absolute 2.97 0.70 - 3.10 10*3/mm3    Monocytes, Absolute 0.61 0.10 - 0.90 10*3/mm3    Eosinophils, Absolute 0.06 0.00 - 0.40 10*3/mm3    Basophils, Absolute 0.05 0.00 - 0.20 10*3/mm3    Immature Grans, Absolute 0.03 0.00 - 0.05 10*3/mm3    nRBC 0.0 0.0 - 0.2 /100 WBC   D-dimer, Quantitative    Collection Time: 10/31/24  9:41 PM    Specimen: Blood   Result Value Ref Range    D-Dimer, Quantitative <0.27 0.00 - 0.50 MCGFEU/mL     Note: In addition to lab results from this visit, the labs listed above may include labs taken at another facility or during a different encounter within the last 24 hours. Please correlate lab times with ED admission and discharge times for further clarification of the services performed during this visit.                 Fabrice Greenwood MD  11/01/24 024

## 2024-11-05 ENCOUNTER — TELEPHONE (OUTPATIENT)
Dept: CARDIOLOGY | Facility: HOSPITAL | Age: 29
End: 2024-11-05
Payer: COMMERCIAL

## 2024-11-05 NOTE — TELEPHONE ENCOUNTER
11/05/24    Lilly Arellano Fell  [address] [facility] [address]           You were referred to the [facility] by the physician after your Emergency Department visit.  We have tried to contact you to set up an appointment because timely follow up is important for your health.     Please call our office as soon as possible so that we can schedule your appointment. Our office hours are Monday - Friday, 8:00 a.m. to 4:30 p.m.  If you have already contacted us to schedule your appointment, please disregard this letter.       Location of your appointment:   [facility] [place]        [address] [place] [address]         [address]         [place] [address]  [phone]            Directions:  Free  Services will be located in front of the [facility] building. From the main hospital entrance off [address] (Entrance 2), proceed straight ahead to the [facility] Building.  Take the elevator to the 5th floor, we are Room 506.      Thank you.

## 2024-11-06 ENCOUNTER — TELEMEDICINE (OUTPATIENT)
Dept: INTERNAL MEDICINE | Facility: CLINIC | Age: 29
End: 2024-11-06
Payer: COMMERCIAL

## 2024-11-06 VITALS — HEIGHT: 64 IN | BODY MASS INDEX: 50.02 KG/M2 | WEIGHT: 293 LBS

## 2024-11-06 DIAGNOSIS — J98.8 RESPIRATORY INFECTION: Primary | ICD-10-CM

## 2024-11-06 DIAGNOSIS — F41.0 PANIC DISORDER (EPISODIC PAROXYSMAL ANXIETY): ICD-10-CM

## 2024-11-06 DIAGNOSIS — F41.1 GENERALIZED ANXIETY DISORDER: ICD-10-CM

## 2024-11-06 DIAGNOSIS — F43.10 POST TRAUMATIC STRESS DISORDER (PTSD): ICD-10-CM

## 2024-11-06 DIAGNOSIS — F33.1 MODERATE EPISODE OF RECURRENT MAJOR DEPRESSIVE DISORDER: ICD-10-CM

## 2024-11-06 PROCEDURE — 99214 OFFICE O/P EST MOD 30 MIN: CPT | Performed by: NURSE PRACTITIONER

## 2024-11-06 PROCEDURE — 1125F AMNT PAIN NOTED PAIN PRSNT: CPT | Performed by: NURSE PRACTITIONER

## 2024-11-06 PROCEDURE — 1160F RVW MEDS BY RX/DR IN RCRD: CPT | Performed by: NURSE PRACTITIONER

## 2024-11-06 PROCEDURE — 1159F MED LIST DOCD IN RCRD: CPT | Performed by: NURSE PRACTITIONER

## 2024-11-06 RX ORDER — AZITHROMYCIN 250 MG/1
TABLET, FILM COATED ORAL
Qty: 6 TABLET | Refills: 0 | Status: SHIPPED | OUTPATIENT
Start: 2024-11-06

## 2024-11-06 NOTE — PROGRESS NOTES
This was an audio and video enabled visit.   This provider is located at [address] [place], [place] using a secure [employer] Video Visit through [email or web address]. Lilly  is being seen remotely via telehealth  in [place]. Pt provided a secure environment for this session.  Lilly may be asked to present for in office testing and/or evaluation if felt to be unsafe for telemedicine.    The provider identified herself /credentials as appropriate.   By proceeding with the telehealth visit, the patient consents to be seen remotely which allows for patient identifiable information to be sent to a third party as needed. The patient may refuse to be seen remotely at any time. The electronic data is encrypted and password protected, and the patient is advised of the potential risks to privacy not withstanding such measures.    You have chosen to receive care through a telehealth visit. Do you consent to use a video/audio connection for your medical care today? Yes      Subjective   Lilly Segovia is a 29 y.o. female.     Chief Complaint   Patient presents with    Anxiety        History of Present Illness  The patient is a 29-year-old female who is being seen by video visit today in follow-up for anxiety, depression, and PTSD. At her last visit, she was referred to behavioral health.    She reports difficulty being in public and at school due to her anxiety and PTSD. She uses Xanax as needed and has taken Seroquel on a few occasions to manage her anxiety and aid sleep. She discontinued lamotrigine due to its perceived contribution to her anxiety, which has since improved slightly. She has an upcoming appointment with [facility] for medication management.    She fell ill last Thursday with a sore throat and tested negative for all conditions at urgent care. She now experiences chest tightness and a persistent cough, which exacerbates her existing breathing difficulties. She was not prescribed antibiotics  as her symptoms were attributed to a viral infection. She had a fever on the first night of her illness. She visited the ER last Friday due to chest pain, but her lung x-ray was normal. Despite these challenges, she has been attending school for 8 hours daily since Monday. She uses a CPAP machine at night.    Her current weight is 328 pounds. She reports a decreased appetite when anxious and has not been eating much recently. She has been maintaining a high protein diet.      Results  Imaging  Chest X-ray showed no abnormalities.    Lab Results   Component Value Date    WBC 9.04 10/31/2024    HGB 12.5 10/31/2024    HCT 36.7 10/31/2024    MCV 82.8 10/31/2024     10/31/2024     Lab Results   Component Value Date    GLUCOSE 119 (H) 10/31/2024    BUN 12 10/31/2024    CREATININE 0.63 10/31/2024    EGFR 122.6 10/31/2024    BCR 19.0 10/31/2024    K 3.9 10/31/2024    CO2 22.0 10/31/2024    CALCIUM 9.5 10/31/2024    ALBUMIN 4.3 10/31/2024    BILITOT 0.2 10/31/2024    AST 53 (H) 10/31/2024    ALT 76 (H) 10/31/2024     Lab Results   Component Value Date    CHOL 182 08/13/2024    TRIG 110 08/13/2024    HDL 32 (L) 08/13/2024     (H) 08/13/2024     Lab Results   Component Value Date    TSH 4.440 (H) 08/13/2024     A1C Last 3 Results          8/13/2024    11:35   HGBA1C Last 3 Results   Hemoglobin A1C 5.40          The following portions of the patient's history were reviewed and updated as appropriate: allergies, current medications, past family history, past medical history, past social history, past surgical history and problem list.          Outpatient Medications Marked as Taking for the 11/6/24 encounter (Telemedicine) with Dior Bach APRN   Medication Sig Dispense Refill    ALPRAZolam (Xanax) 0.25 MG tablet Take 1 tablet by mouth 2 (Two) Times a Day As Needed for Anxiety. 10 tablet 0    fluticasone (Flonase) 50 MCG/ACT nasal spray 2 sprays into the nostril(s) as directed by provider Daily. 16 g 11     "levothyroxine (SYNTHROID, LEVOTHROID) 112 MCG tablet TAKE 1 TABLET BY MOUTH ONCE DAILY 30 tablet 1    omeprazole (priLOSEC) 40 MG capsule TAKE 1 CAPSULE BY MOUTH ONCE DAILY 30 capsule 5    QUEtiapine (SEROquel) 25 MG tablet TAKE 1/2 TO 1 TABLET BY MOUTH TWICE DAILY AS NEEDED FOR ANXIETY/PANIC 60 tablet 1    rizatriptan MLT (MAXALT-MLT) 5 MG disintegrating tablet Place 1 tablet on the tongue 1 (One) Time As Needed for Migraine. May repeat in 2 hours if needed 9 tablet 2    Ventolin  (90 Base) MCG/ACT inhaler INHALE 2 PUFFS BY MOUTH EVERY 4 TO 6 HOURS AS NEEDED FOR SHORTNESS OF BREATH 18 g 0     Allergies   Allergen Reactions    Cetirizine Hives           Objective     Physical Exam   Constitutional: She is oriented to person, place, and time. She appears well-developed and well-nourished. No distress.   HENT:   Head: Normocephalic and atraumatic.   Right Ear: External ear normal.   Left Ear: External ear normal.   Mouth/Throat: Oropharynx is clear and moist.   Eyes: Right eye exhibits no discharge. Left eye exhibits no discharge. No scleral icterus.   Neck: Neck normal appearance.  Pulmonary/Chest: Effort normal. No accessory muscle usage.  No respiratory distress.No use of oxygen by nasal cannula  Abdominal: Abdomen appears normal.   Neurological: She is alert and oriented to person, place, and time.   Skin: Skin is dry. She is not diaphoretic. No erythema. No pallor.   Psychiatric: She has a normal mood and affect. Her speech is normal and behavior is normal. Judgment normal. She is attentive.         Vitals:    11/06/24 1648   Weight: (!) 149 kg (328 lb)  Comment: pt reported   Height: 162.6 cm (64\")     Body mass index is 56.3 kg/m².        Assessment & Plan     Diagnoses and all orders for this visit:    1. Respiratory infection (Primary)  -     azithromycin (Zithromax Z-Robbin) 250 MG tablet; Take 2 tablets by mouth on day 1, then 1 tablet daily on days 2-5  Dispense: 6 tablet; Refill: 0    2. Generalized " anxiety disorder    3. Post traumatic stress disorder (PTSD)    4. Panic disorder (episodic paroxysmal anxiety)    5. Moderate episode of recurrent major depressive disorder    6. BMI 50.0-59.9, adult        Assessment & Plan  1. Anxiety.  She reports increased anxiety, particularly in public settings and while at school. She has been using Xanax as needed and Seroquel occasionally to help with sleep. She is advised to avoid Seroquel while taking the Z-Robbin. She will continue with her current anxiety management plan and follow up with behavioral health for medication management.    2. Depression.  She reports some improvement in symptoms after discontinuing lamotrigine. Lamotrigine will be discontinued as it increased her anxiety. She will follow up with behavioral health for further medication management.    3. Post-Traumatic Stress Disorder (PTSD).  She continues to experience symptoms of PTSD, which are exacerbated by her current illness and school environment. She will follow up with behavioral health for further management.    4. Respiratory Infection.  She has been experiencing symptoms for over a week, including a sore throat, chest tightness, and productive cough. A Z-Robbin will be prescribed to treat the respiratory infection. She is advised to avoid taking Seroquel while on the Z-Robbin.    5. Obesity.  Her current weight is 328 lbs. She is advised to maintain a protein intake of 70 to 100 g per day and a minimum of 1200 calories per day. Regular exercise, totaling 150 minutes of moderate intensity spread throughout the week, is recommended. The diagnosis of obesity will be added to her medical record to ensure insurance coverage for weight management visits.             FU PRN and in 3 m for chronic condition    I have reviewed the limitations of a telehealth visit (such as lack of a physical exam and unable to obtain vital signs) and advised the patient that they may need to follow up for an office visit  should their symptoms or concerns persist, worsen, or change.  Patient was encouraged to keep me informed of any acute changes, lack of improvement, or any new concerning symptoms.   I discussed my findings,recommendations, and plan of care was with the patient. They verbalized understanding and agreement.    Patient or patient representative verbalized consent for the use of Ambient Listening during the visit with  EDGARDO Patterson for chart documentation. 11/10/2024  13:10 EST

## 2024-11-13 NOTE — PROGRESS NOTES
Sleep Clinic Follow Up Note    Chief Complaint  Sleeping Problem, Follow-up, and Sleep Apnea    Subjective     History of Present Illness (from previous encounter on 7/9/2024 with Dr. Killian):  She has longstanding snoring.  She thinks that she snored as a teenager.  She also has a long standing daytime somnolence and has noted this for about 10 years.     She has been observed by her  to have apneas and snore heavily.  She can awaken herself up choking.  She frequently awakens with morning headaches that seem to dissipate.  She awakens with a dry mouth.  She has had significant elevation in her weight over recent years.     She averages 7 hours of sleep on a nightly basis.  She thinks she averages up to 5 awakenings through the night.  It takes around 30 minutes to initiate sleep.  She typically does not nap during the day.     Bloomington Scale is: 2/24 (End copied text).    -A home sleep test was obtained on 9/10/2024 revealing mild obstructive sleep apnea with an AHI of 11.4/H.    Interval History:  Lilly Segovia is a 29 y.o. female returns for follow up and compliance of PAP therapy. The patient was last seen on 7/9/2024 by Dr. Killian. Overall the patient feels good with regard to therapy. The device appears to be working appropriately. On average the patient sleeps 8 hours per night. The patient wakes 1-2 times per night. She notes she feels worse without use of the device.     The patient reports the following changes to their medical and medication history since they were last seen:  None      Further details are as follows:      Bloomington Scale is (out of 24): Total score: 3     Weight:  Current Weight: 230 lb    Weight change in the last year:  gain: 0 lbs    The patient's relevant past medical, surgical, family, and social history reviewed and updated in Epic as appropriate.    PMH:    Past Medical History:   Diagnosis Date    ADHD (attention deficit hyperactivity disorder)     Asthma     COVID  "02/15/2022    Dyspepsia     Dysphagia     w/ meds (must crush)    Dysthymic disorder 2021    Formatting of this note might be different from the original.  Saw counselors and took medications as a teenager. Was in a mental hospital at one point. Unsure what the diagnoses were. States all medications (lexapro, paxil, prozac) made her feel like a zombie.  In early , tried to take citalopram liquid for 1 week, had side effects (felt drugged up). Stopped medication. Feels like anxiety com    Fatigue     Gallstones     CT 2023    GERD (gastroesophageal reflux disease)     on PPI, denies prior eval    Hypertension     Hypothyroidism     Kidney agenesis 2021    Formatting of this note might be different from the original.  Went to ER in 2021 for pain, had CT scan which showed lack of a kidney.    Migraine 2019    Topamax w/ prn Tylenol + caffeine    Postoperative nausea     \"extreme\"    Preeclampsia     PTSD (post-traumatic stress disorder) 2020    Formatting of this note might be different from the original.  Prior panic attacks.  Went on trip last month and had some anxiety over it.  Worried about a possible COVID exposure but, did not come down with it.   Feels like her anxiety is getting worse.  She is not working, she's at home all day with her daughter and the dog.  She feels like her anxiety is turning into rage, like she needs to yel    Sleep apnea     eval (P)     Past Surgical History:   Procedure Laterality Date    COLONOSCOPY N/A 2023    Procedure: COLONOSCOPY;  Surgeon: Eder Fragoso MD;  Location: [facility] ENDOSCOPY;  Service: Gastroenterology;  Laterality: N/A;    WISDOM TOOTH EXTRACTION N/A      OB History          2    Para   1    Term           1    AB   1    Living   1         SAB        IAB        Ectopic        Molar        Multiple        Live Births   1              Allergies   Allergen Reactions    Cetirizine Hives "       MEDS:  Prior to Admission medications    Medication Sig Start Date End Date Taking? Authorizing Provider   ALPRAZolam (Xanax) 0.25 MG tablet Take 1 tablet by mouth 2 (Two) Times a Day As Needed for Anxiety. 10/16/24   Dior Bach APRN   azithromycin (Zithromax Z-Robbin) 250 MG tablet Take 2 tablets by mouth on day 1, then 1 tablet daily on days 2-5 11/6/24   Dior Bach APRN   fluticasone (Flonase) 50 MCG/ACT nasal spray 2 sprays into the nostril(s) as directed by provider Daily. 3/11/22   Dior Bach APRN   levothyroxine (SYNTHROID, LEVOTHROID) 112 MCG tablet TAKE 1 TABLET BY MOUTH ONCE DAILY 9/16/24   Sahara Augustin MD   omeprazole (priLOSEC) 40 MG capsule TAKE 1 CAPSULE BY MOUTH ONCE DAILY 9/16/24   Dior Bach APRN   QUEtiapine (SEROquel) 25 MG tablet TAKE 1/2 TO 1 TABLET BY MOUTH TWICE DAILY AS NEEDED FOR ANXIETY/PANIC 10/18/24   Dior Bach APRN   rizatriptan MLT (MAXALT-MLT) 5 MG disintegrating tablet Place 1 tablet on the tongue 1 (One) Time As Needed for Migraine. May repeat in 2 hours if needed 4/9/24   Dior Bach APRN   Ventolin  (90 Base) MCG/ACT inhaler INHALE 2 PUFFS BY MOUTH EVERY 4 TO 6 HOURS AS NEEDED FOR SHORTNESS OF BREATH 10/7/24   Dior Bach APRN         FH:  Family History   Problem Relation Age of Onset    Anxiety disorder Mother     Depression Mother     Hypertension Mother     Diabetes Mother     Diverticulitis Mother     Pancreatitis Mother     Cholelithiasis Mother     Asthma Mother     Anxiety disorder Maternal Grandmother     Depression Maternal Grandmother     Obesity Maternal Grandmother     Diabetes Maternal Grandmother     Hypertension Maternal Grandmother     Heart disease Maternal Grandmother 70        cause of death    Sleep apnea Maternal Grandmother     Kidney failure Maternal Grandmother     Depression Maternal Aunt     Anxiety disorder Maternal Aunt        Objective   Vital Signs:  /70 (BP Location: Left arm,  "Patient Position: Sitting, Cuff Size: Adult)   Pulse 81   Temp 94 °F (34.4 °C) (Temporal)   Ht 162.6 cm (64.02\")   Wt (!) 150 kg (330 lb 11.2 oz)   SpO2 96%   BMI 56.74 kg/m²              Physical Exam  Vitals reviewed.   Constitutional:       Appearance: Normal appearance.   HENT:      Head: Normocephalic and atraumatic.      Nose: Nose normal.      Mouth/Throat:      Mouth: Mucous membranes are moist.   Cardiovascular:      Rate and Rhythm: Normal rate and regular rhythm.      Heart sounds: No murmur heard.     No friction rub. No gallop.   Pulmonary:      Effort: Pulmonary effort is normal. No respiratory distress.      Breath sounds: Normal breath sounds. No wheezing or rhonchi.   Neurological:      Mental Status: She is alert and oriented to person, place, and time.   Psychiatric:         Behavior: Behavior normal.               Result Review :           PAP Report:  AHI: 0.5/h  Days of Usage: 29/30 (97%)  Number of Days Greater than 4 hours: 26/30 (87%)  Average time (days used): 6 hours 49 minutes  95th Percentile Pressure: 13.4 cmH2O  95th percentile leaks: 5.6 L/min  Settings: Auto CPAP-8/18 cm H2O, EPR full-time, EPR level 1, response standard.       Assessment and Plan  Lilly Segovia is a 29 y.o. female who returns for follow-up and compliance of PAP therapy.  The Pap report has been reviewed.  Overall usage is at 97% with compliance of 87%.  The patient averages 6 hours and 49 minutes of therapy.  Sleep apnea is well-controlled with an AHI of 0.5/H.  The patient has a history of mild obstructive sleep apnea with an initial AHI of 11.4/H. I will refill the patient's supplies, and I have asked them to return for follow-up and compliance in 1 year or sooner should they have further questions or concerns.     Diagnoses and all orders for this visit:    1. AISHA (obstructive sleep apnea) (Primary)  -     PAP Therapy    2. Morbid (severe) obesity due to excess calories         The patient continues to " use and benefit from PAP therapy.    1. The patient was counseled regarding multimodal approach with healthy nutrition, healthy sleep, regular physical activity, social activities, counseling, and medications. Encouraged to practice lateral sleep position. Avoid alcohol and sedatives close to bedtime.     2.  We will refill supplies x1 year.  Return to clinic 1 year or sooner if symptoms warrant. I have reviewed the results of my evaluation and impression and discussed my recommendations in detail with the patient.           Follow Up  Return in about 1 year (around 11/18/2025) for Annual visit.  Patient was given instructions and counseling regarding her condition or for health maintenance advice. Please see specific information pulled into the AVS if appropriate.       EDGARDO Mak, ACNP-BC  Pulmonology, Critical Care, and Sleep Medicine

## 2024-11-15 DIAGNOSIS — J45.20 MILD INTERMITTENT ASTHMA WITHOUT COMPLICATION: ICD-10-CM

## 2024-11-15 RX ORDER — ALBUTEROL SULFATE 90 UG/1
AEROSOL, METERED RESPIRATORY (INHALATION)
Qty: 18 G | Refills: 0 | Status: SHIPPED | OUTPATIENT
Start: 2024-11-15

## 2024-11-15 NOTE — TELEPHONE ENCOUNTER
Rx Refill Note  Requested Prescriptions     Pending Prescriptions Disp Refills    Ventolin  (90 Base) MCG/ACT inhaler [Pharmacy Med Name: VENTOLIN HFA 90 MCG INHALER 108 (90 BAS Aerosol] 18 g 0     Sig: INHALE 2 PUFFS BY MOUTH EVERY 4 TO 6 HOURS AS NEEDED FOR SHORTNESS OF BREATH      Last office visit with prescribing clinician: 10/8/2024   Last telemedicine visit with prescribing clinician: 11/6/2024   Next office visit with prescribing clinician: Visit date not found     Joanne Maciel MA  11/15/24, 16:52 EST

## 2024-11-18 ENCOUNTER — OFFICE VISIT (OUTPATIENT)
Dept: SLEEP MEDICINE | Age: 29
End: 2024-11-18
Payer: COMMERCIAL

## 2024-11-18 VITALS
BODY MASS INDEX: 50.02 KG/M2 | SYSTOLIC BLOOD PRESSURE: 132 MMHG | WEIGHT: 293 LBS | OXYGEN SATURATION: 96 % | HEIGHT: 64 IN | DIASTOLIC BLOOD PRESSURE: 70 MMHG | TEMPERATURE: 94 F | HEART RATE: 81 BPM

## 2024-11-18 DIAGNOSIS — E66.01 MORBID (SEVERE) OBESITY DUE TO EXCESS CALORIES: ICD-10-CM

## 2024-11-18 DIAGNOSIS — G47.33 OSA (OBSTRUCTIVE SLEEP APNEA): Primary | ICD-10-CM

## 2024-11-18 PROCEDURE — 1160F RVW MEDS BY RX/DR IN RCRD: CPT | Performed by: NURSE PRACTITIONER

## 2024-11-18 PROCEDURE — 99213 OFFICE O/P EST LOW 20 MIN: CPT | Performed by: NURSE PRACTITIONER

## 2024-11-18 PROCEDURE — 1159F MED LIST DOCD IN RCRD: CPT | Performed by: NURSE PRACTITIONER

## 2024-11-26 ENCOUNTER — HOSPITAL ENCOUNTER (OUTPATIENT)
Dept: ULTRASOUND IMAGING | Facility: HOSPITAL | Age: 29
Discharge: HOME | End: 2024-11-26
Admitting: PHYSICIAN ASSISTANT
Payer: COMMERCIAL

## 2024-11-26 ENCOUNTER — TELEMEDICINE (OUTPATIENT)
Dept: FAMILY MEDICINE CLINIC | Facility: TELEHEALTH | Age: 29
End: 2024-11-26
Payer: COMMERCIAL

## 2024-11-26 DIAGNOSIS — J22 LOWER RESPIRATORY INFECTION (E.G., BRONCHITIS, PNEUMONIA, PNEUMONITIS, PULMONITIS): Primary | ICD-10-CM

## 2024-11-26 DIAGNOSIS — R10.13 DYSPEPSIA: ICD-10-CM

## 2024-11-26 PROCEDURE — 1160F RVW MEDS BY RX/DR IN RCRD: CPT | Performed by: NURSE PRACTITIONER

## 2024-11-26 PROCEDURE — 1159F MED LIST DOCD IN RCRD: CPT | Performed by: NURSE PRACTITIONER

## 2024-11-26 PROCEDURE — 99213 OFFICE O/P EST LOW 20 MIN: CPT | Performed by: NURSE PRACTITIONER

## 2024-11-26 PROCEDURE — 76705 ECHO EXAM OF ABDOMEN: CPT

## 2024-11-26 RX ORDER — PREDNISOLONE 15 MG/5ML
15 SOLUTION ORAL 2 TIMES DAILY WITH MEALS
Qty: 50 ML | Refills: 0 | Status: SHIPPED | OUTPATIENT
Start: 2024-11-26 | End: 2024-12-01

## 2024-11-26 RX ORDER — FLUCONAZOLE 150 MG/1
150 TABLET ORAL
Qty: 2 TABLET | Refills: 0 | Status: SHIPPED | OUTPATIENT
Start: 2024-11-26

## 2024-11-26 RX ORDER — AMOXICILLIN 400 MG/5ML
POWDER, FOR SUSPENSION ORAL
Qty: 220 ML | Refills: 0 | Status: SHIPPED | OUTPATIENT
Start: 2024-11-26

## 2024-11-26 RX ORDER — ALBUTEROL SULFATE 90 UG/1
2 INHALANT RESPIRATORY (INHALATION) EVERY 4 HOURS PRN
Qty: 18 G | Refills: 0 | Status: SHIPPED | OUTPATIENT
Start: 2024-11-26

## 2024-11-26 NOTE — PROGRESS NOTES
"HPI  Lilly Segovia is a 29 y.o. female  presents with complaint of symptoms starting over one week ago including chest tightness, cough, congestion, fatigue, weakness. Had a fever at beginning of illness but denies the last few days. Tested negative for covid. Does have some mild wheezing and SOA with exertion; history of asthma.     Review of Systems    Past Medical History:   Diagnosis Date    ADHD (attention deficit hyperactivity disorder)     Asthma     COVID 02/15/2022    Dyspepsia     Dysphagia     w/ meds (must crush)    Dysthymic disorder 05/03/2021    Formatting of this note might be different from the original.  Saw counselors and took medications as a teenager. Was in a mental hospital at one point. Unsure what the diagnoses were. States all medications (lexapro, paxil, prozac) made her feel like a zombie.  In early 2020, tried to take citalopram liquid for 1 week, had side effects (felt drugged up). Stopped medication. Feels like anxiety com    Fatigue     Gallstones     CT 1/2023    GERD (gastroesophageal reflux disease)     on PPI, denies prior eval    Hypertension     Hypothyroidism     Kidney agenesis 04/19/2021    Formatting of this note might be different from the original.  Went to ER in April 2021 for pain, had CT scan which showed lack of a kidney.    Migraine 03/21/2019    Topamax w/ prn Tylenol + caffeine    Postoperative nausea     \"extreme\"    Preeclampsia 2019    PTSD (post-traumatic stress disorder) 12/01/2020    Formatting of this note might be different from the original.  Prior panic attacks.  Went on trip last month and had some anxiety over it.  Worried about a possible COVID exposure but, did not come down with it.   Feels like her anxiety is getting worse.  She is not working, she's at home all day with her daughter and the dog.  She feels like her anxiety is turning into rage, like she needs to yel    Sleep apnea     zhane (P)       Family History   Problem Relation Age of Onset "    Anxiety disorder Mother     Depression Mother     Hypertension Mother     Diabetes Mother     Diverticulitis Mother     Pancreatitis Mother     Cholelithiasis Mother     Asthma Mother     Anxiety disorder Maternal Grandmother     Depression Maternal Grandmother     Obesity Maternal Grandmother     Diabetes Maternal Grandmother     Hypertension Maternal Grandmother     Heart disease Maternal Grandmother 70        cause of death    Sleep apnea Maternal Grandmother     Kidney failure Maternal Grandmother     Depression Maternal Aunt     Anxiety disorder Maternal Aunt        Social History     Socioeconomic History    Marital status: Single    Number of children: 1    Highest education level: High school graduate   Tobacco Use    Smoking status: Former     Current packs/day: 0.00     Average packs/day: 0.5 packs/day for 6.0 years (3.0 ttl pk-yrs)     Types: Cigarettes     Start date: 1/1/2008     Quit date: 1/1/2014     Years since quitting: 10.9     Passive exposure: Past    Smokeless tobacco: Never   Vaping Use    Vaping status: Never Used   Substance and Sexual Activity    Alcohol use: Not Currently     Alcohol/week: 1.0 - 3.0 standard drink of alcohol     Types: 1 - 3 Glasses of wine per week    Drug use: Not Currently     Types: Marijuana    Sexual activity: Yes     Partners: Male     Birth control/protection: None         There were no vitals taken for this visit.    PHYSICAL EXAM  Physical Exam   Constitutional: She appears well-developed and well-nourished.   HENT:   Head: Normocephalic.   Nose: Nose normal.   Neck: Neck normal appearance.  Pulmonary/Chest: Effort normal.   Neurological: She is alert.   Psychiatric: She has a normal mood and affect. Her speech is normal.       Diagnoses and all orders for this visit:    1. Lower respiratory infection (e.g., bronchitis, pneumonia, pneumonitis, pulmonitis) (Primary)  -     albuterol sulfate  (90 Base) MCG/ACT inhaler; Inhale 2 puffs Every 4 (Four) Hours  As Needed for Wheezing.  Dispense: 18 g; Refill: 0  -     fluconazole (Diflucan) 150 MG tablet; Take 1 tablet by mouth Every 72 (Seventy-Two) Hours As Needed (yeast).  Dispense: 2 tablet; Refill: 0  -     amoxicillin (AMOXIL) 400 MG/5ML suspension; Take 11ml twice a day for 10 days.  Dispense: 220 mL; Refill: 0  -     prednisoLONE (PRELONE) 15 MG/5ML solution oral solution; Take 5 mL by mouth 2 (Two) Times a Day With Meals for 5 days.  Dispense: 50 mL; Refill: 0          FOLLOW-UP  As discussed during visit with [facility], if symptoms worsen or fail to improve, follow-up with PCP/Urgent Care/Emergency Department.    Patient verbalizes understanding of medications, instructions for treatment and follow-up.    Gypsy Vann, EDGARDO  11/26/2024  15:32 EST      Mode of Visit: Video  Location of patient: -HOME-  Location of provider: Home  You have chosen to receive care through a telehealth visit.  The patient has signed the video visit consent form.  The visit included audio and video interaction. No technical issues occurred during this visit.

## 2024-12-05 ENCOUNTER — TELEMEDICINE (OUTPATIENT)
Dept: BARIATRICS/WEIGHT MGMT | Facility: CLINIC | Age: 29
End: 2024-12-05
Payer: COMMERCIAL

## 2024-12-05 VITALS — WEIGHT: 293 LBS | HEIGHT: 64 IN | BODY MASS INDEX: 50.02 KG/M2

## 2024-12-05 DIAGNOSIS — K21.9 GASTROESOPHAGEAL REFLUX DISEASE, UNSPECIFIED WHETHER ESOPHAGITIS PRESENT: Primary | ICD-10-CM

## 2024-12-05 PROCEDURE — 1159F MED LIST DOCD IN RCRD: CPT | Performed by: NURSE PRACTITIONER

## 2024-12-05 PROCEDURE — 99214 OFFICE O/P EST MOD 30 MIN: CPT | Performed by: NURSE PRACTITIONER

## 2024-12-05 PROCEDURE — 1160F RVW MEDS BY RX/DR IN RCRD: CPT | Performed by: NURSE PRACTITIONER

## 2024-12-05 NOTE — PROGRESS NOTES
[facility] GROUP BARIATRIC SURGERY  [address]  [address]  [facility] [id number] [phone]      Patient  Name:  Lilly Segovia  :  1995      Date of Visit: 2024        Chief Complaint:  weight gain; unable to maintain weight loss      History of Present Illness:  Lilly Segovia is a 29 y.o. female who presents today for evaluation, education and consultation regarding metabolic and bariatric surgery with Dr. Combs.     Lilly has been overweight for at least 12 years, has been 35 pounds or more overweight for at least 12 years, has been 100 pounds or more overweight for 5 or more years and started dieting at age 19.  Previous diet attempts include: High Protein, Low Carbohydrate, Low Fat, Calorie Counting, Jamel's Diet, Fasting, and Slim Fast; Overeaters Anonymous.  The most weight Lilly lost was 20 pounds but was unable to maintain that weight loss.  Her maximum lifetime weight is 335 pounds.      Hx GERD. Takes daily PPI. No previous eval.     Complete history has been obtained and discussed today, as pertinent to metabolic/ bariatric surgery.     Past Medical History:   Diagnosis Date    ADHD (attention deficit hyperactivity disorder)     Asthma     COVID 02/15/2022    Dyspepsia     Dysphagia     w/ meds (must crush)    Dysthymic disorder 2021    Formatting of this note might be different from the original.  Saw counselors and took medications as a teenager. Was in a mental hospital at one point. Unsure what the diagnoses were. States all medications (lexapro, paxil, prozac) made her feel like a zombie.  In early , tried to take citalopram liquid for 1 week, had side effects (felt drugged up). Stopped medication. Feels like anxiety com    Fatigue     Gallstones     CT 2023    GERD (gastroesophageal reflux disease)     on PPI, denies prior eval    Hypertension     Hypothyroidism     Kidney agenesis 2021    Formatting of this note might be different from the  "original.  Went to ER in April 2021 for pain, had CT scan which showed lack of a kidney.    Migraine 03/21/2019    Topamax w/ prn Tylenol + caffeine    Postoperative nausea     \"extreme\"    Preeclampsia 2019    PTSD (post-traumatic stress disorder) 12/01/2020    Formatting of this note might be different from the original.  Prior panic attacks.  Went on trip last month and had some anxiety over it.  Worried about a possible COVID exposure but, did not come down with it.   Feels like her anxiety is getting worse.  She is not working, she's at home all day with her daughter and the dog.  She feels like her anxiety is turning into rage, like she needs to yel    Sleep apnea     eval (P)     Past Surgical History:   Procedure Laterality Date    COLONOSCOPY N/A 03/23/2023    Procedure: COLONOSCOPY;  Surgeon: Eder Fragoso MD;  Location: [facility] ENDOSCOPY;  Service: Gastroenterology;  Laterality: N/A;    WISDOM TOOTH EXTRACTION N/A 2010       Allergies   Allergen Reactions    Cetirizine Hives       Current Outpatient Medications:     albuterol sulfate  (90 Base) MCG/ACT inhaler, Inhale 2 puffs Every 4 (Four) Hours As Needed for Wheezing., Disp: 18 g, Rfl: 0    ALPRAZolam (Xanax) 0.25 MG tablet, Take 1 tablet by mouth 2 (Two) Times a Day As Needed for Anxiety., Disp: 10 tablet, Rfl: 0    fluconazole (Diflucan) 150 MG tablet, Take 1 tablet by mouth Every 72 (Seventy-Two) Hours As Needed (yeast)., Disp: 2 tablet, Rfl: 0    fluticasone (Flonase) 50 MCG/ACT nasal spray, 2 sprays into the nostril(s) as directed by provider Daily., Disp: 16 g, Rfl: 11    levothyroxine (SYNTHROID, LEVOTHROID) 112 MCG tablet, TAKE 1 TABLET BY MOUTH ONCE DAILY, Disp: 30 tablet, Rfl: 1    omeprazole (priLOSEC) 40 MG capsule, TAKE 1 CAPSULE BY MOUTH ONCE DAILY, Disp: 30 capsule, Rfl: 5    QUEtiapine (SEROquel) 25 MG tablet, TAKE 1/2 TO 1 TABLET BY MOUTH TWICE DAILY AS NEEDED FOR ANXIETY/PANIC, Disp: 60 tablet, Rfl: 1    " rizatriptan MLT (MAXALT-MLT) 5 MG disintegrating tablet, Place 1 tablet on the tongue 1 (One) Time As Needed for Migraine. May repeat in 2 hours if needed, Disp: 9 tablet, Rfl: 2    Ventolin  (90 Base) MCG/ACT inhaler, INHALE 2 PUFFS BY MOUTH EVERY 4 TO 6 HOURS AS NEEDED FOR SHORTNESS OF BREATH, Disp: 18 g, Rfl: 0    amoxicillin (AMOXIL) 400 MG/5ML suspension, Take 11ml twice a day for 10 days. (Patient not taking: Reported on 12/5/2024), Disp: 220 mL, Rfl: 0    Social History     Socioeconomic History    Marital status: Single    Number of children: 1    Highest education level: High school graduate   Tobacco Use    Smoking status: Former     Current packs/day: 0.00     Average packs/day: 0.5 packs/day for 6.0 years (3.0 ttl pk-yrs)     Types: Cigarettes     Start date: 1/1/2008     Quit date: 1/1/2014     Years since quitting: 10.9     Passive exposure: Past    Smokeless tobacco: Never   Vaping Use    Vaping status: Never Used   Substance and Sexual Activity    Alcohol use: Not Currently     Alcohol/week: 1.0 - 3.0 standard drink of alcohol     Types: 1 - 3 Glasses of wine per week    Drug use: Not Currently     Types: Marijuana    Sexual activity: Yes     Partners: Male     Birth control/protection: None     Social History     Social History Narrative    Lives in [place], [place].  Single w/ one child. Student.       Family History   Problem Relation Age of Onset    Anxiety disorder Mother     Depression Mother     Hypertension Mother     Diabetes Mother     Diverticulitis Mother     Pancreatitis Mother     Cholelithiasis Mother     Asthma Mother     Anxiety disorder Maternal Grandmother     Depression Maternal Grandmother     Obesity Maternal Grandmother     Diabetes Maternal Grandmother     Hypertension Maternal Grandmother     Heart disease Maternal Grandmother 70        cause of death    Sleep apnea Maternal Grandmother     Kidney failure Maternal Grandmother     Depression Maternal Aunt     Anxiety  disorder Maternal Aunt        Review of Systems:  Constitutional:  reports fatigue, weight gain and denies fevers, chills.  HEENT:  denies headache, ear pain or loss of hearing, blurred or double vision, nasal discharge or sore throat.  Cardiovascular:  denies HTN, hx heart disease, chest pain, palpitations, hx DVT.  Respiratory:  reports poss sleep apnea, asthma and denies cough , wheezing, hx PE.  Gastrointestinal:  reports dysphagia, heartburn, gallbladder issues and denies nausea, vomiting, abdominal pain, IBS.  Genitourinary:  reports solitary kidney and denies history of  frequent UTI, incontinence, hematuria, dysuria, polyuria, polydipsia, renal insufficiency.    Musculoskeletal:   denies fibromyalgia and arthritis.  Neurological:   denies numbness /tingling, dizziness, confusion, seizure.  Psychiatric:  reports hx depression, hx anxiety and denies bipolar disorder.  Endocrine:  reports thyroid disease and denies PCOS, endometriosis, diabetes.  Hematologic:  denies bruising, bleeding disorder, hx anemia, hx blood transfusion.  Skin:   denies rashes, hx MRSA.    Physical Exam:  Vital Signs:  Weight: (!) 148 kg (327 lb)   Body mass index is 56.13 kg/m².  Temp: (not recorded)   Pulse: (not recorded)   BP: (not recorded)     From intake eval 9/12/24:  Physical Exam  Vitals reviewed.   Constitutional:       Appearance: She is well-developed.   HENT:      Head: Normocephalic and atraumatic.   Eyes:      General: No scleral icterus.     Conjunctiva/sclera: Conjunctivae normal.   Neck:      Thyroid: No thyromegaly.   Cardiovascular:      Rate and Rhythm: Normal rate and regular rhythm.      Heart sounds: No murmur heard.  Pulmonary:      Effort: Pulmonary effort is normal. No respiratory distress.      Breath sounds: Normal breath sounds. No wheezing or rales.   Abdominal:      General: Bowel sounds are normal. There is no distension.      Palpations: Abdomen is soft. There is no mass.      Tenderness: There is no  abdominal tenderness.      Hernia: No hernia is present.      Comments: no surgical scars   Musculoskeletal:         General: Normal range of motion.      Cervical back: Neck supple.   Skin:     General: Skin is warm and dry.      Findings: No rash.   Neurological:      Mental Status: She is alert and oriented to person, place, and time.      Gait: Gait normal.   Psychiatric:         Judgment: Judgment normal.         Patient Active Problem List   Diagnosis    GERD (gastroesophageal reflux disease)    Asthma, mild    Hypothyroidism    Congenital absence of one kidney    PTSD (post-traumatic stress disorder)    Seasonal allergies    Well woman exam with routine gynecological exam    Panic attacks    Bloody stool    BMI 50.0-59.9, adult    Gallstones    Sleep apnea    Dysphagia    Postoperative nausea    Preeclampsia    Fatigue       Assessment:  29 y.o. female with medically complicated obesity pursuing sleeve gastrectomy.    Metabolic & Bariatric Surgery is deemed medically necessary given the following:  Class 3 Severe Obesity (BMI >=40). Obesity-related health conditions include the following: obstructive sleep apnea, GERD, migraines, asthma, hypothyroidism, dysthymic disorder. Obesity is worsening. BMI is is above average; BMI management plan is completed. We discussed consulting a Bariatric surgeon.        Plan:  Will schedule EGD @ [facility] w/ Dr. Combs for further evaluation. Additional input to follow.         EDGARDO Cohn             Note: This was an audio and video enabled telemedicine encounter conducted via [employer].  Patient is located in [place].  Provider is at the office. Consent was obtained prior to the visit.

## 2024-12-05 NOTE — H&P (VIEW-ONLY)
[facility] GROUP BARIATRIC SURGERY  [address]  [address]  [facility] [id number] [phone]      Patient  Name:  Lilly Segovia  :  1995      Date of Visit: 2024        Chief Complaint:  weight gain; unable to maintain weight loss      History of Present Illness:  Lilly Segovia is a 29 y.o. female who presents today for evaluation, education and consultation regarding metabolic and bariatric surgery with Dr. Combs.     Lilly has been overweight for at least 12 years, has been 35 pounds or more overweight for at least 12 years, has been 100 pounds or more overweight for 5 or more years and started dieting at age 19.  Previous diet attempts include: High Protein, Low Carbohydrate, Low Fat, Calorie Counting, Jamel's Diet, Fasting, and Slim Fast; Overeaters Anonymous.  The most weight Lilly lost was 20 pounds but was unable to maintain that weight loss.  Her maximum lifetime weight is 335 pounds.      Hx GERD. Takes daily PPI. No previous eval.     Complete history has been obtained and discussed today, as pertinent to metabolic/ bariatric surgery.     Past Medical History:   Diagnosis Date    ADHD (attention deficit hyperactivity disorder)     Asthma     COVID 02/15/2022    Dyspepsia     Dysphagia     w/ meds (must crush)    Dysthymic disorder 2021    Formatting of this note might be different from the original.  Saw counselors and took medications as a teenager. Was in a mental hospital at one point. Unsure what the diagnoses were. States all medications (lexapro, paxil, prozac) made her feel like a zombie.  In early , tried to take citalopram liquid for 1 week, had side effects (felt drugged up). Stopped medication. Feels like anxiety com    Fatigue     Gallstones     CT 2023    GERD (gastroesophageal reflux disease)     on PPI, denies prior eval    Hypertension     Hypothyroidism     Kidney agenesis 2021    Formatting of this note might be different from the  "original.  Went to ER in April 2021 for pain, had CT scan which showed lack of a kidney.    Migraine 03/21/2019    Topamax w/ prn Tylenol + caffeine    Postoperative nausea     \"extreme\"    Preeclampsia 2019    PTSD (post-traumatic stress disorder) 12/01/2020    Formatting of this note might be different from the original.  Prior panic attacks.  Went on trip last month and had some anxiety over it.  Worried about a possible COVID exposure but, did not come down with it.   Feels like her anxiety is getting worse.  She is not working, she's at home all day with her daughter and the dog.  She feels like her anxiety is turning into rage, like she needs to yel    Sleep apnea     eval (P)     Past Surgical History:   Procedure Laterality Date    COLONOSCOPY N/A 03/23/2023    Procedure: COLONOSCOPY;  Surgeon: Eder Fragoso MD;  Location: [facility] ENDOSCOPY;  Service: Gastroenterology;  Laterality: N/A;    WISDOM TOOTH EXTRACTION N/A 2010       Allergies   Allergen Reactions    Cetirizine Hives       Current Outpatient Medications:     albuterol sulfate  (90 Base) MCG/ACT inhaler, Inhale 2 puffs Every 4 (Four) Hours As Needed for Wheezing., Disp: 18 g, Rfl: 0    ALPRAZolam (Xanax) 0.25 MG tablet, Take 1 tablet by mouth 2 (Two) Times a Day As Needed for Anxiety., Disp: 10 tablet, Rfl: 0    fluconazole (Diflucan) 150 MG tablet, Take 1 tablet by mouth Every 72 (Seventy-Two) Hours As Needed (yeast)., Disp: 2 tablet, Rfl: 0    fluticasone (Flonase) 50 MCG/ACT nasal spray, 2 sprays into the nostril(s) as directed by provider Daily., Disp: 16 g, Rfl: 11    levothyroxine (SYNTHROID, LEVOTHROID) 112 MCG tablet, TAKE 1 TABLET BY MOUTH ONCE DAILY, Disp: 30 tablet, Rfl: 1    omeprazole (priLOSEC) 40 MG capsule, TAKE 1 CAPSULE BY MOUTH ONCE DAILY, Disp: 30 capsule, Rfl: 5    QUEtiapine (SEROquel) 25 MG tablet, TAKE 1/2 TO 1 TABLET BY MOUTH TWICE DAILY AS NEEDED FOR ANXIETY/PANIC, Disp: 60 tablet, Rfl: 1    " rizatriptan MLT (MAXALT-MLT) 5 MG disintegrating tablet, Place 1 tablet on the tongue 1 (One) Time As Needed for Migraine. May repeat in 2 hours if needed, Disp: 9 tablet, Rfl: 2    Ventolin  (90 Base) MCG/ACT inhaler, INHALE 2 PUFFS BY MOUTH EVERY 4 TO 6 HOURS AS NEEDED FOR SHORTNESS OF BREATH, Disp: 18 g, Rfl: 0    amoxicillin (AMOXIL) 400 MG/5ML suspension, Take 11ml twice a day for 10 days. (Patient not taking: Reported on 12/5/2024), Disp: 220 mL, Rfl: 0    Social History     Socioeconomic History    Marital status: Single    Number of children: 1    Highest education level: High school graduate   Tobacco Use    Smoking status: Former     Current packs/day: 0.00     Average packs/day: 0.5 packs/day for 6.0 years (3.0 ttl pk-yrs)     Types: Cigarettes     Start date: 1/1/2008     Quit date: 1/1/2014     Years since quitting: 10.9     Passive exposure: Past    Smokeless tobacco: Never   Vaping Use    Vaping status: Never Used   Substance and Sexual Activity    Alcohol use: Not Currently     Alcohol/week: 1.0 - 3.0 standard drink of alcohol     Types: 1 - 3 Glasses of wine per week    Drug use: Not Currently     Types: Marijuana    Sexual activity: Yes     Partners: Male     Birth control/protection: None     Social History     Social History Narrative    Lives in [place], [place].  Single w/ one child. Student.       Family History   Problem Relation Age of Onset    Anxiety disorder Mother     Depression Mother     Hypertension Mother     Diabetes Mother     Diverticulitis Mother     Pancreatitis Mother     Cholelithiasis Mother     Asthma Mother     Anxiety disorder Maternal Grandmother     Depression Maternal Grandmother     Obesity Maternal Grandmother     Diabetes Maternal Grandmother     Hypertension Maternal Grandmother     Heart disease Maternal Grandmother 70        cause of death    Sleep apnea Maternal Grandmother     Kidney failure Maternal Grandmother     Depression Maternal Aunt     Anxiety  disorder Maternal Aunt        Review of Systems:  Constitutional:  reports fatigue, weight gain and denies fevers, chills.  HEENT:  denies headache, ear pain or loss of hearing, blurred or double vision, nasal discharge or sore throat.  Cardiovascular:  denies HTN, hx heart disease, chest pain, palpitations, hx DVT.  Respiratory:  reports poss sleep apnea, asthma and denies cough , wheezing, hx PE.  Gastrointestinal:  reports dysphagia, heartburn, gallbladder issues and denies nausea, vomiting, abdominal pain, IBS.  Genitourinary:  reports solitary kidney and denies history of  frequent UTI, incontinence, hematuria, dysuria, polyuria, polydipsia, renal insufficiency.    Musculoskeletal:   denies fibromyalgia and arthritis.  Neurological:   denies numbness /tingling, dizziness, confusion, seizure.  Psychiatric:  reports hx depression, hx anxiety and denies bipolar disorder.  Endocrine:  reports thyroid disease and denies PCOS, endometriosis, diabetes.  Hematologic:  denies bruising, bleeding disorder, hx anemia, hx blood transfusion.  Skin:   denies rashes, hx MRSA.    Physical Exam:  Vital Signs:  Weight: (!) 148 kg (327 lb)   Body mass index is 56.13 kg/m².  Temp: (not recorded)   Pulse: (not recorded)   BP: (not recorded)     From intake eval 9/12/24:  Physical Exam  Vitals reviewed.   Constitutional:       Appearance: She is well-developed.   HENT:      Head: Normocephalic and atraumatic.   Eyes:      General: No scleral icterus.     Conjunctiva/sclera: Conjunctivae normal.   Neck:      Thyroid: No thyromegaly.   Cardiovascular:      Rate and Rhythm: Normal rate and regular rhythm.      Heart sounds: No murmur heard.  Pulmonary:      Effort: Pulmonary effort is normal. No respiratory distress.      Breath sounds: Normal breath sounds. No wheezing or rales.   Abdominal:      General: Bowel sounds are normal. There is no distension.      Palpations: Abdomen is soft. There is no mass.      Tenderness: There is no  abdominal tenderness.      Hernia: No hernia is present.      Comments: no surgical scars   Musculoskeletal:         General: Normal range of motion.      Cervical back: Neck supple.   Skin:     General: Skin is warm and dry.      Findings: No rash.   Neurological:      Mental Status: She is alert and oriented to person, place, and time.      Gait: Gait normal.   Psychiatric:         Judgment: Judgment normal.         Patient Active Problem List   Diagnosis    GERD (gastroesophageal reflux disease)    Asthma, mild    Hypothyroidism    Congenital absence of one kidney    PTSD (post-traumatic stress disorder)    Seasonal allergies    Well woman exam with routine gynecological exam    Panic attacks    Bloody stool    BMI 50.0-59.9, adult    Gallstones    Sleep apnea    Dysphagia    Postoperative nausea    Preeclampsia    Fatigue       Assessment:  29 y.o. female with medically complicated obesity pursuing sleeve gastrectomy.    Metabolic & Bariatric Surgery is deemed medically necessary given the following:  Class 3 Severe Obesity (BMI >=40). Obesity-related health conditions include the following: obstructive sleep apnea, GERD, migraines, asthma, hypothyroidism, dysthymic disorder. Obesity is worsening. BMI is is above average; BMI management plan is completed. We discussed consulting a Bariatric surgeon.        Plan:  Will schedule EGD @ [facility] w/ Dr. Combs for further evaluation. Additional input to follow.         EDGARDO Cohn             Note: This was an audio and video enabled telemedicine encounter conducted via [employer].  Patient is located in [place].  Provider is at the office. Consent was obtained prior to the visit.

## 2024-12-11 ENCOUNTER — OFFICE VISIT (OUTPATIENT)
Dept: INTERNAL MEDICINE | Facility: CLINIC | Age: 29
End: 2024-12-11
Payer: COMMERCIAL

## 2024-12-11 VITALS
RESPIRATION RATE: 18 BRPM | SYSTOLIC BLOOD PRESSURE: 114 MMHG | OXYGEN SATURATION: 97 % | BODY MASS INDEX: 50.02 KG/M2 | TEMPERATURE: 98 F | HEIGHT: 64 IN | DIASTOLIC BLOOD PRESSURE: 76 MMHG | HEART RATE: 74 BPM | WEIGHT: 293 LBS

## 2024-12-11 DIAGNOSIS — F41.0 PANIC DISORDER (EPISODIC PAROXYSMAL ANXIETY): ICD-10-CM

## 2024-12-11 DIAGNOSIS — F41.1 GENERALIZED ANXIETY DISORDER: Primary | ICD-10-CM

## 2024-12-11 DIAGNOSIS — R00.2 PALPITATIONS: ICD-10-CM

## 2024-12-11 PROCEDURE — 1159F MED LIST DOCD IN RCRD: CPT | Performed by: NURSE PRACTITIONER

## 2024-12-11 PROCEDURE — 99214 OFFICE O/P EST MOD 30 MIN: CPT | Performed by: NURSE PRACTITIONER

## 2024-12-11 PROCEDURE — 1160F RVW MEDS BY RX/DR IN RCRD: CPT | Performed by: NURSE PRACTITIONER

## 2024-12-11 PROCEDURE — 1125F AMNT PAIN NOTED PAIN PRSNT: CPT | Performed by: NURSE PRACTITIONER

## 2024-12-11 RX ORDER — PROPRANOLOL HCL 20 MG
20 TABLET ORAL 2 TIMES DAILY
Qty: 60 TABLET | Refills: 3 | Status: SHIPPED | OUTPATIENT
Start: 2024-12-11

## 2024-12-11 RX ORDER — CLONAZEPAM 0.5 MG/1
0.5 TABLET ORAL 2 TIMES DAILY PRN
COMMUNITY

## 2024-12-11 NOTE — PROGRESS NOTES
Subjective   Lilly Segovia is a 29 y.o. female.     Chief Complaint   Patient presents with    Anxiety    Depression       PCP: Dior Bach APRN    History of Present Illness     History of Present Illness  The patient is a 29-year-old female who presents for follow-up on anxiety and depression.    She was previously prescribed Cymbalta, starting at a dose of 30 mg with a planned increase to 60 mg after 2 weeks. However, she experienced significant side effects, including severe nausea for the first 3 days, dissociation, and heightened anxiety. Consequently, she discontinued the medication after one week. She has a scheduled appointment with her psychiatrist next week. She has been prescribed Klonopin but has only taken it 2 or 3 times due to infrequent panic attacks. She reports feeling comfortable at home but experiences anxiety in public settings. Her psychiatrist recommended Klonopin over Xanax due to its longer duration of action and less severe withdrawal symptoms. She was provided with a 60-day supply of Klonopin but does not believe it is as effective as Xanax. She finds Klonopin helpful for mild anxiety, noting its calming effect lasts longer, although it takes longer to take effect. She still has approximately 20 tablets left from her initial prescription. She has been making efforts to increase her protein intake and engage in regular walking exercise. She recalls that Lexapro was the most effective medication for her anxiety and mood stabilization.    She has been experiencing palpitations, both during the day and at night when lying down. These episodes are characterized by loud, pounding heartbeats that cause her distress. She had similar episodes approximately 10 years ago, which led to a cardiology referral and an echocardiogram, both of which were normal. She is uncertain about the triggers for these palpitations but recalls consuming soda three times on the day of her most recent episode.  She also reports a history of GERD for the past 10 years.    Supplemental Information  She had an ultrasound that showed fatty liver and gallstones. They plan to remove her gallbladder during bariatric surgery. The PA at the clinic suggested asking about potential insurance coverage for Mounjaro for fatty liver disease.    MEDICATIONS  Discontinued: Cymbalta  Current: Klonopin    Results  Imaging  Ultrasound showed fatty liver and gallstones.    Lab Results   Component Value Date    WBC 9.04 10/31/2024    HGB 12.5 10/31/2024    HCT 36.7 10/31/2024    MCV 82.8 10/31/2024     10/31/2024     Lab Results   Component Value Date    GLUCOSE 119 (H) 10/31/2024    BUN 12 10/31/2024    CREATININE 0.63 10/31/2024    EGFR 122.6 10/31/2024    BCR 19.0 10/31/2024    K 3.9 10/31/2024    CO2 22.0 10/31/2024    CALCIUM 9.5 10/31/2024    ALBUMIN 4.3 10/31/2024    BILITOT 0.2 10/31/2024    AST 53 (H) 10/31/2024    ALT 76 (H) 10/31/2024     Lab Results   Component Value Date    CHOL 182 08/13/2024    TRIG 110 08/13/2024    HDL 32 (L) 08/13/2024     (H) 08/13/2024     Lab Results   Component Value Date    TSH 4.440 (H) 08/13/2024     A1C Last 3 Results          8/13/2024    11:35   HGBA1C Last 3 Results   Hemoglobin A1C 5.40        The following portions of the patient's history were reviewed and updated as appropriate: allergies, current medications, past family history, past medical history, past social history, past surgical history and problem list.              Outpatient Medications Marked as Taking for the 12/11/24 encounter (Office Visit) with Dior Bach APRN   Medication Sig Dispense Refill    albuterol sulfate  (90 Base) MCG/ACT inhaler Inhale 2 puffs Every 4 (Four) Hours As Needed for Wheezing. 18 g 0    clonazePAM (KlonoPIN) 0.5 MG tablet Take 1 tablet by mouth 2 (Two) Times a Day As Needed.      fluticasone (Flonase) 50 MCG/ACT nasal spray 2 sprays into the nostril(s) as directed by provider  "Daily. 16 g 11    levothyroxine (SYNTHROID, LEVOTHROID) 112 MCG tablet TAKE 1 TABLET BY MOUTH ONCE DAILY 30 tablet 1    omeprazole (priLOSEC) 40 MG capsule TAKE 1 CAPSULE BY MOUTH ONCE DAILY 30 capsule 5    QUEtiapine (SEROquel) 25 MG tablet TAKE 1/2 TO 1 TABLET BY MOUTH TWICE DAILY AS NEEDED FOR ANXIETY/PANIC 60 tablet 1    rizatriptan MLT (MAXALT-MLT) 5 MG disintegrating tablet Place 1 tablet on the tongue 1 (One) Time As Needed for Migraine. May repeat in 2 hours if needed 9 tablet 2     Allergies   Allergen Reactions    Cetirizine Hives           Objective     Vitals:    12/11/24 1339   BP: 114/76   BP Location: Right arm   Patient Position: Sitting   Cuff Size: Adult   Pulse: 74   Resp: 18   Temp: 98 °F (36.7 °C)   TempSrc: Infrared   SpO2: 97%   Weight: (!) 152 kg (334 lb 12.8 oz)   Height: 162.6 cm (64.02\")     Body mass index is 57.44 kg/m².  Wt Readings from Last 3 Encounters:   12/11/24 (!) 152 kg (334 lb 12.8 oz)   12/05/24 (!) 148 kg (327 lb)   11/18/24 (!) 150 kg (330 lb 11.2 oz)       Physical Exam  Vitals and nursing note reviewed.   Constitutional:       General: She is not in acute distress.     Appearance: Normal appearance. She is well-developed. She is not ill-appearing or diaphoretic.   HENT:      Head: Normocephalic and atraumatic.   Eyes:      General: No scleral icterus.     Conjunctiva/sclera: Conjunctivae normal.   Neck:      Vascular: No JVD.   Cardiovascular:      Rate and Rhythm: Normal rate and regular rhythm.      Chest Wall: PMI is not displaced. No thrill.      Heart sounds: Normal heart sounds. No murmur heard.  Pulmonary:      Effort: Pulmonary effort is normal. No accessory muscle usage or respiratory distress.      Breath sounds: Normal breath sounds.   Chest:      Chest wall: No tenderness.   Abdominal:      General: Bowel sounds are normal. There is no distension.      Palpations: Abdomen is soft.      Tenderness: There is no abdominal tenderness.   Musculoskeletal:         " General: Normal range of motion.      Cervical back: Normal range of motion.      Right lower leg: No edema.      Left lower leg: No edema.   Skin:     General: Skin is warm and dry.      Coloration: Skin is not ashen, jaundiced, mottled or pale.      Findings: No erythema.   Neurological:      General: No focal deficit present.      Mental Status: She is alert and oriented to person, place, and time.   Psychiatric:         Attention and Perception: Attention normal.         Mood and Affect: Affect normal. Mood is anxious.         Behavior: Behavior normal. Behavior is cooperative.         Thought Content: Thought content normal.         Cognition and Memory: Cognition normal.         Judgment: Judgment normal.           Assessment & Plan   Diagnoses and all orders for this visit:    1. Generalized anxiety disorder (Primary)    2. Panic disorder (episodic paroxysmal anxiety)    3. Palpitations  -     propranolol (INDERAL) 20 MG tablet; Take 1 tablet by mouth 2 (Two) Times a Day.  Dispense: 60 tablet; Refill: 3      Recent ER records reviewed with pt in clinic  Assessment & Plan  1. Anxiety and depression.  Given her current engagement with behavioral health services, the initiation of anxiety medication such as Cymbalta is not deemed necessary at this time. She has expressed a preference for clonazepam over Xanax, which can be utilized sparingly to manage her symptoms effectively. A low dose of propranolol will be prescribed, to be taken twice daily, to alleviate her palpitations and anxiety. She is advised to continue her follow-ups with behavioral health for ongoing management.    2. Palpitations.  She reports experiencing palpitations, sometimes during the day and sometimes at night. Stress, anxiety, and caffeine intake were discussed as potential triggers. She has had a cardiac workup in the past, including an ultrasound of her heart, which showed no abnormalities. Propranolol will be prescribed at a low dose to  be taken twice daily to help manage these symptoms.    Follow-up  The patient will follow up in approximately 4 to 6 weeks.            Return in about 6 weeks (around 1/22/2025) for Recheck.    I discussed my findings,recommendations, and plan of care was with the patient. They verbalized understanding and agreement.  Patient was encouraged to keep me informed of any acute changes, lack of improvement, or any new concerning symptoms.     Patient or patient representative verbalized consent for the use of Ambient Listening during the visit with  EDGARDO Patterson for chart documentation. 12/12/2024  10:27 EST

## 2024-12-13 ENCOUNTER — PRE-ADMISSION TESTING (OUTPATIENT)
Dept: PREADMISSION TESTING | Facility: HOSPITAL | Age: 29
End: 2024-12-13
Payer: COMMERCIAL

## 2024-12-13 LAB
DEPRECATED RDW RBC AUTO: 41.9 FL (ref 37–54)
ERYTHROCYTE [DISTWIDTH] IN BLOOD BY AUTOMATED COUNT: 13.6 % (ref 12.3–15.4)
HCT VFR BLD AUTO: 36.2 % (ref 34–46.6)
HGB BLD-MCNC: 11.9 G/DL (ref 12–15.9)
MCH RBC QN AUTO: 27.7 PG (ref 26.6–33)
MCHC RBC AUTO-ENTMCNC: 32.9 G/DL (ref 31.5–35.7)
MCV RBC AUTO: 84.4 FL (ref 79–97)
PLATELET # BLD AUTO: 236 10*3/MM3 (ref 140–450)
PMV BLD AUTO: 10.2 FL (ref 6–12)
POTASSIUM SERPL-SCNC: 4 MMOL/L (ref 3.5–5.2)
RBC # BLD AUTO: 4.29 10*6/MM3 (ref 3.77–5.28)
WBC NRBC COR # BLD AUTO: 7.86 10*3/MM3 (ref 3.4–10.8)

## 2024-12-13 PROCEDURE — 84132 ASSAY OF SERUM POTASSIUM: CPT

## 2024-12-13 PROCEDURE — 85027 COMPLETE CBC AUTOMATED: CPT

## 2024-12-13 PROCEDURE — 36415 COLL VENOUS BLD VENIPUNCTURE: CPT

## 2024-12-15 ENCOUNTER — ANESTHESIA EVENT (OUTPATIENT)
Dept: GASTROENTEROLOGY | Facility: HOSPITAL | Age: 29
End: 2024-12-15
Payer: COMMERCIAL

## 2024-12-16 ENCOUNTER — ANESTHESIA (OUTPATIENT)
Dept: GASTROENTEROLOGY | Facility: HOSPITAL | Age: 29
End: 2024-12-16
Payer: COMMERCIAL

## 2024-12-16 ENCOUNTER — HOSPITAL ENCOUNTER (OUTPATIENT)
Facility: HOSPITAL | Age: 29
Setting detail: HOSPITAL OUTPATIENT SURGERY
Discharge: HOME | End: 2024-12-16
Attending: SURGERY | Admitting: SURGERY
Payer: COMMERCIAL

## 2024-12-16 VITALS
WEIGHT: 293 LBS | BODY MASS INDEX: 50.02 KG/M2 | HEART RATE: 63 BPM | HEIGHT: 64 IN | TEMPERATURE: 94 F | RESPIRATION RATE: 16 BRPM | OXYGEN SATURATION: 92 % | DIASTOLIC BLOOD PRESSURE: 69 MMHG | SYSTOLIC BLOOD PRESSURE: 104 MMHG

## 2024-12-16 DIAGNOSIS — K21.9 GASTROESOPHAGEAL REFLUX DISEASE, UNSPECIFIED WHETHER ESOPHAGITIS PRESENT: ICD-10-CM

## 2024-12-16 PROCEDURE — 25010000002 PROPOFOL 10 MG/ML EMULSION: Performed by: NURSE ANESTHETIST, CERTIFIED REGISTERED

## 2024-12-16 PROCEDURE — 88305 TISSUE EXAM BY PATHOLOGIST: CPT | Performed by: SURGERY

## 2024-12-16 PROCEDURE — 25010000002 LIDOCAINE 1 % SOLUTION: Performed by: NURSE ANESTHETIST, CERTIFIED REGISTERED

## 2024-12-16 PROCEDURE — 43239 EGD BIOPSY SINGLE/MULTIPLE: CPT | Performed by: SURGERY

## 2024-12-16 RX ORDER — SODIUM CHLORIDE 9 MG/ML
150 INJECTION, SOLUTION INTRAVENOUS CONTINUOUS
Status: DISCONTINUED | OUTPATIENT
Start: 2024-12-16 | End: 2024-12-16 | Stop reason: HOSPADM

## 2024-12-16 RX ORDER — SODIUM CHLORIDE, SODIUM LACTATE, POTASSIUM CHLORIDE, CALCIUM CHLORIDE 600; 310; 30; 20 MG/100ML; MG/100ML; MG/100ML; MG/100ML
9 INJECTION, SOLUTION INTRAVENOUS CONTINUOUS
Status: DISCONTINUED | OUTPATIENT
Start: 2024-12-17 | End: 2024-12-16 | Stop reason: HOSPADM

## 2024-12-16 RX ORDER — IPRATROPIUM BROMIDE AND ALBUTEROL SULFATE 2.5; .5 MG/3ML; MG/3ML
3 SOLUTION RESPIRATORY (INHALATION) ONCE AS NEEDED
Status: DISCONTINUED | OUTPATIENT
Start: 2024-12-16 | End: 2024-12-16 | Stop reason: HOSPADM

## 2024-12-16 RX ORDER — LIDOCAINE HYDROCHLORIDE 10 MG/ML
0.5 INJECTION, SOLUTION EPIDURAL; INFILTRATION; INTRACAUDAL; PERINEURAL ONCE AS NEEDED
Status: DISCONTINUED | OUTPATIENT
Start: 2024-12-16 | End: 2024-12-16 | Stop reason: HOSPADM

## 2024-12-16 RX ORDER — SODIUM CHLORIDE 9 MG/ML
40 INJECTION, SOLUTION INTRAVENOUS AS NEEDED
Status: DISCONTINUED | OUTPATIENT
Start: 2024-12-16 | End: 2024-12-16 | Stop reason: HOSPADM

## 2024-12-16 RX ORDER — MIDAZOLAM HYDROCHLORIDE 1 MG/ML
1 INJECTION, SOLUTION INTRAMUSCULAR; INTRAVENOUS
Status: DISCONTINUED | OUTPATIENT
Start: 2024-12-16 | End: 2024-12-16 | Stop reason: HOSPADM

## 2024-12-16 RX ORDER — FAMOTIDINE 20 MG/1
20 TABLET, FILM COATED ORAL ONCE
Status: DISCONTINUED | OUTPATIENT
Start: 2024-12-16 | End: 2024-12-16 | Stop reason: HOSPADM

## 2024-12-16 RX ORDER — SODIUM CHLORIDE 0.9 % (FLUSH) 0.9 %
3 SYRINGE (ML) INJECTION EVERY 12 HOURS SCHEDULED
Status: DISCONTINUED | OUTPATIENT
Start: 2024-12-16 | End: 2024-12-16 | Stop reason: HOSPADM

## 2024-12-16 RX ORDER — SODIUM CHLORIDE 0.9 % (FLUSH) 0.9 %
10 SYRINGE (ML) INJECTION AS NEEDED
Status: DISCONTINUED | OUTPATIENT
Start: 2024-12-16 | End: 2024-12-16 | Stop reason: HOSPADM

## 2024-12-16 RX ORDER — ONDANSETRON 2 MG/ML
4 INJECTION INTRAMUSCULAR; INTRAVENOUS ONCE AS NEEDED
Status: DISCONTINUED | OUTPATIENT
Start: 2024-12-16 | End: 2024-12-16 | Stop reason: HOSPADM

## 2024-12-16 RX ORDER — PROPOFOL 10 MG/ML
VIAL (ML) INTRAVENOUS AS NEEDED
Status: DISCONTINUED | OUTPATIENT
Start: 2024-12-16 | End: 2024-12-16 | Stop reason: SURG

## 2024-12-16 RX ORDER — FAMOTIDINE 10 MG/ML
20 INJECTION, SOLUTION INTRAVENOUS ONCE
Status: COMPLETED | OUTPATIENT
Start: 2024-12-16 | End: 2024-12-16

## 2024-12-16 RX ORDER — DEXMEDETOMIDINE HYDROCHLORIDE 100 UG/ML
INJECTION, SOLUTION INTRAVENOUS AS NEEDED
Status: DISCONTINUED | OUTPATIENT
Start: 2024-12-16 | End: 2024-12-16 | Stop reason: SURG

## 2024-12-16 RX ORDER — SODIUM CHLORIDE 0.9 % (FLUSH) 0.9 %
3-10 SYRINGE (ML) INJECTION AS NEEDED
Status: DISCONTINUED | OUTPATIENT
Start: 2024-12-16 | End: 2024-12-16 | Stop reason: HOSPADM

## 2024-12-16 RX ORDER — LIDOCAINE HYDROCHLORIDE 10 MG/ML
INJECTION, SOLUTION INFILTRATION; PERINEURAL AS NEEDED
Status: DISCONTINUED | OUTPATIENT
Start: 2024-12-16 | End: 2024-12-16 | Stop reason: SURG

## 2024-12-16 RX ORDER — SODIUM CHLORIDE 0.9 % (FLUSH) 0.9 %
10 SYRINGE (ML) INJECTION EVERY 12 HOURS SCHEDULED
Status: DISCONTINUED | OUTPATIENT
Start: 2024-12-16 | End: 2024-12-16 | Stop reason: HOSPADM

## 2024-12-16 RX ADMIN — DEXMEDETOMIDINE HYDROCHLORIDE 16 MCG: 100 INJECTION, SOLUTION INTRAVENOUS at 08:24

## 2024-12-16 RX ADMIN — LIDOCAINE HYDROCHLORIDE 100 MG: 10 INJECTION, SOLUTION INFILTRATION; PERINEURAL at 08:24

## 2024-12-16 RX ADMIN — FAMOTIDINE 20 MG: 10 INJECTION, SOLUTION INTRAVENOUS at 07:46

## 2024-12-16 RX ADMIN — PROPOFOL 230 MG: 10 INJECTION, EMULSION INTRAVENOUS at 08:24

## 2024-12-16 NOTE — OP NOTE
PROCEDURE NOTE.    Date: 12/16/24    Patient: Lilly Segovia     Surgeon: Jenelle Combs MD      Preoperative Diagnosis: GERD     Postoperative Diagnosis: GERD     Procedure Performed: Esophagogastroduodenoscopy with biopsy (CPT 90690)    Estimated Blood Loss: minimal    Complications: none    Findings: The GEJ was at 38.  There was a hiatal hernia.       Specimens: Distal esophageal (37cm) and antral biopsies     Indications: Lilly Segovia is a 29 y.o. year old supermorbidly obese female who is pursuing metabolic and bariatric surgery..  The patient has reflux for which they take PPI daily.  H. Pylori was Negative.  She presents today for diagnostic endoscopy.           Procedure:      After informed consent was taken, the patient was brought to the operating room and placed in the supine position. MAC was given by anesthesia staff. A bite block was placed and time out performed. A flexible endoscope was passed transorally down to the second portion of the duodenum without difficulty. The scope was slowly withdrawn and the anatomy examined with photodocumentation throughout. The duodenum was normal. The pylorus was without spasm. The antrum of the stomach was without significant gastritis. A biopsy was taken to rule out H. Pylori. The scope was retroflexed and the body, fundus, and cardia were examined. There was no abnormality and no hiatal hernia seen from this angle. The scope was withdrawn back into the esophagus after decompressing the stomach. The GE junction was at 38 cm and the distal esophagus showed no evidence of reflux esophagitis. Biopsy was taken. There was a sliding hiatal hernia. The scope was further withdrawn. There was no other esophageal abnormality. The vocal cords were normal. The scope was withdrawn completely and the procedure concluded. The patient was awakened and taken to recovery in good condition.      Recommendations:     We will discuss biopsy results at next office  appointment.

## 2024-12-16 NOTE — ANESTHESIA PREPROCEDURE EVALUATION
Anesthesia Evaluation     Patient summary reviewed and Nursing notes reviewed   history of anesthetic complications:  PONV  NPO Solid Status: > 8 hours  NPO Liquid Status: > 2 hours           Airway   Mallampati: II  TM distance: >3 FB  Neck ROM: full  No difficulty expected  Dental      Pulmonary    (+) a smoker (2014) Former, cigarettes, asthma (MDI),sleep apnea on CPAP  (-) shortness of breath, recent URI, no home oxygen  Cardiovascular     ECG reviewed    (+) hypertension  (-) past MI, dysrhythmias, angina    ROS comment: ECG NSR     Neuro/Psych  (+) headaches  (-) seizures, CVA  GI/Hepatic/Renal/Endo    (+) obesity, morbid obesity, GERD, renal disease (agenesis one side   creat normal)-, thyroid problem hypothyroidism  (-) diabetes (A1C 5.2)    Musculoskeletal     Abdominal    Substance History      OB/GYN    (-)  Pregnant, Preeclampsia and history of pregnancy induced hypertension        Other   arthritis,                   Anesthesia Plan    ASA 3     general and MAC     (TOP POM PFL)  intravenous induction     Anesthetic plan, risks, benefits, and alternatives have been provided, discussed and informed consent has been obtained with: patient.    Plan discussed with CRNA.      CODE STATUS:

## 2024-12-16 NOTE — ANESTHESIA POSTPROCEDURE EVALUATION
Patient: Lilly Segovia    Procedure Summary       Date: 12/16/24 Room / Location:  [facility] /  [facility]    Anesthesia Start: 0818 Anesthesia Stop:     Procedure: ESOPHAGOGASTRODUODENOSCOPY WITH BIOPSY Diagnosis:       Gastroesophageal reflux disease, unspecified whether esophagitis present      (Gastroesophageal reflux disease, unspecified whether esophagitis present [K21.9])    Surgeons: Jenelle Combs MD Provider: Yonatan Storey MD    Anesthesia Type: general, MAC ASA Status: 3            Anesthesia Type: general, MAC    Vitals  No vitals data found for the desired time range.          Post Anesthesia Care and Evaluation    Patient location during evaluation: PACU  Patient participation: complete - patient participated  Level of consciousness: awake and alert  Pain management: adequate    Airway patency: patent  Anesthetic complications: No anesthetic complications  PONV Status: none  Cardiovascular status: hemodynamically stable and acceptable  Respiratory status: nonlabored ventilation and acceptable  Hydration status: acceptable

## 2024-12-17 LAB
CYTO UR: NORMAL
LAB AP CASE REPORT: NORMAL
LAB AP CLINICAL INFORMATION: NORMAL
PATH REPORT.FINAL DX SPEC: NORMAL
PATH REPORT.GROSS SPEC: NORMAL

## 2024-12-30 ENCOUNTER — TELEPHONE (OUTPATIENT)
Dept: INTERNAL MEDICINE | Facility: CLINIC | Age: 29
End: 2024-12-30
Payer: COMMERCIAL

## 2024-12-30 NOTE — TELEPHONE ENCOUNTER
Pt called to report that she lost the entire bottle of propranolol. Is it possible to resend another script? Please advise.

## 2024-12-30 NOTE — TELEPHONE ENCOUNTER
HUB to relay:  Medication has refills at the pharmacy.  Patient will need to pay out of pocket for the medication.

## 2025-01-03 ENCOUNTER — TELEPHONE (OUTPATIENT)
Dept: INTERNAL MEDICINE | Facility: CLINIC | Age: 30
End: 2025-01-03

## 2025-01-03 ENCOUNTER — OFFICE VISIT (OUTPATIENT)
Dept: INTERNAL MEDICINE | Facility: CLINIC | Age: 30
End: 2025-01-03
Payer: COMMERCIAL

## 2025-01-03 VITALS
TEMPERATURE: 97.5 F | HEIGHT: 64 IN | HEART RATE: 81 BPM | WEIGHT: 293 LBS | SYSTOLIC BLOOD PRESSURE: 126 MMHG | OXYGEN SATURATION: 98 % | DIASTOLIC BLOOD PRESSURE: 80 MMHG | BODY MASS INDEX: 50.02 KG/M2

## 2025-01-03 DIAGNOSIS — R10.9 RIGHT FLANK PAIN: ICD-10-CM

## 2025-01-03 DIAGNOSIS — M54.31 SCIATICA OF RIGHT SIDE: Primary | ICD-10-CM

## 2025-01-03 LAB
BILIRUB BLD-MCNC: NEGATIVE MG/DL
CLARITY, POC: CLEAR
COLOR UR: YELLOW
EXPIRATION DATE: ABNORMAL
GLUCOSE UR STRIP-MCNC: NEGATIVE MG/DL
KETONES UR QL: NEGATIVE
LEUKOCYTE EST, POC: NEGATIVE
Lab: ABNORMAL
NITRITE UR-MCNC: NEGATIVE MG/ML
PH UR: 6 [PH] (ref 5–8)
PROT UR STRIP-MCNC: ABNORMAL MG/DL
RBC # UR STRIP: NEGATIVE /UL
SP GR UR: 1.02 (ref 1–1.03)
UROBILINOGEN UR QL: NORMAL

## 2025-01-03 RX ORDER — CYCLOBENZAPRINE HCL 5 MG
5 TABLET ORAL 3 TIMES DAILY PRN
Qty: 30 TABLET | Refills: 0 | Status: SHIPPED | OUTPATIENT
Start: 2025-01-03

## 2025-01-03 NOTE — TELEPHONE ENCOUNTER
----- Message from Sahara Augusitn sent at 1/3/2025  4:35 PM EST -----  Regarding: lab to psych  copy of gene sight testing done in August to [facility]-psychiatry

## 2025-01-03 NOTE — PROGRESS NOTES
"Subjective   Lilly Segovia is a 29 y.o. female.     Chief Complaint   Patient presents with    Back Pain     Constant Low back side pain with right leg numbness 3 weeks.  No known injury.  Worse when bending over.           Visit Vitals  /80 (BP Location: Right arm, Patient Position: Sitting, Cuff Size: Adult)   Pulse 81   Temp 97.5 °F (36.4 °C)   Ht 162.6 cm (64\")   Wt (!) 150 kg (330 lb)   LMP 12/16/2024   SpO2 98%   BMI 56.64 kg/m²       Wt Readings from Last 3 Encounters:   01/03/25 (!) 150 kg (330 lb)   12/30/24 (!) 150 kg (330 lb)   12/16/24 (!) 152 kg (334 lb)         Back Pain  This is a new problem. The current episode started 1 to 4 weeks ago. The problem occurs constantly. The problem is unchanged. The pain is present in the gluteal, lumbar spine and sacro-iliac. The quality of the pain is described as aching, shooting and stabbing. The pain radiates to the right buttock, right thigh and right anterolateral lower leg. The pain is at a severity of 5/10. The pain is The same all the time. The symptoms are aggravated by bending, position and twisting. Stiffness is present In the morning. Associated symptoms include headaches, leg pain, numbness, paresthesias and weakness. Pertinent negatives include no abdominal pain, bladder incontinence, bowel incontinence, chest pain, dysuria, fever, paresis, pelvic pain, perianal numbness, tingling or weight loss. Risk factors include lack of exercise, obesity and sedentary lifestyle. Treatments tried: tylenol.   Flank Pain  This is a new problem. The problem occurs constantly. Pain location: mild right CVA. The pain does not radiate. The pain is mild. Associated symptoms include headaches, leg pain, numbness, paresthesias and weakness. Pertinent negatives include no abdominal pain, bladder incontinence, bowel incontinence, chest pain, dysuria, fever, paresis, pelvic pain, perianal numbness, tingling or weight loss.        The following portions of the patient's " "history were reviewed and updated as appropriate: allergies, current medications, past family history, past medical history, past social history, past surgical history, and problem list.    Past Medical History:   Diagnosis Date    ADHD (attention deficit hyperactivity disorder)     Arthritis     Asthma     COVID 02/15/2022    Dyspepsia     Dysphagia     w/ meds (must crush)    Dysthymic disorder 05/03/2021    Formatting of this note might be different from the original.  Saw counselors and took medications as a teenager. Was in a mental hospital at one point. Unsure what the diagnoses were. States all medications (lexapro, paxil, prozac) made her feel like a zombie.  In early 2020, tried to take citalopram liquid for 1 week, had side effects (felt drugged up). Stopped medication. Feels like anxiety com    Fatigue     Gallstones     CT 1/2023    GERD (gastroesophageal reflux disease)     on PPI, denies prior eval    Gestational hypertension     Hypothyroidism     Kidney agenesis 04/19/2021    Formatting of this note might be different from the original.  Went to ER in April 2021 for pain, had CT scan which showed lack of a kidney.    Migraine 03/21/2019    Topamax w/ prn Tylenol + caffeine    Postoperative nausea     \"extreme nuasea\" after CSY    Preeclampsia 2019    PTSD (post-traumatic stress disorder) 12/01/2020    Formatting of this note might be different from the original.  Prior panic attacks.  Went on trip last month and had some anxiety over it.  Worried about a possible COVID exposure but, did not come down with it.   Feels like her anxiety is getting worse.  She is not working, she's at home all day with her daughter and the dog.  She feels like her anxiety is turning into rage, like she needs to yel    Sleep apnea     CPAP nightly      Past Surgical History:   Procedure Laterality Date    COLONOSCOPY N/A 03/23/2023    Procedure: COLONOSCOPY;  Surgeon: Eder Fragoso MD;  Location: [facility]" ENDOSCOPY;  Service: Gastroenterology;  Laterality: N/A;    ENDOSCOPY N/A 2024    Procedure: ESOPHAGOGASTRODUODENOSCOPY WITH BIOPSY;  Surgeon: Jenelle Combs MD;  Location: [facility] ENDOSCOPY;  Service: General;  Laterality: N/A;    WISDOM TOOTH EXTRACTION N/A       Family History   Problem Relation Age of Onset    Anxiety disorder Mother     Depression Mother     Hypertension Mother     Diabetes Mother     Diverticulitis Mother     Pancreatitis Mother     Cholelithiasis Mother     Asthma Mother     Anxiety disorder Maternal Grandmother     Depression Maternal Grandmother     Obesity Maternal Grandmother     Diabetes Maternal Grandmother     Hypertension Maternal Grandmother     Heart disease Maternal Grandmother 70        cause of death    Sleep apnea Maternal Grandmother     Kidney failure Maternal Grandmother     Depression Maternal Aunt     Anxiety disorder Maternal Aunt       Social History     Socioeconomic History    Marital status: Single    Number of children: 1    Highest education level: High school graduate   Tobacco Use    Smoking status: Former     Current packs/day: 0.00     Average packs/day: 0.5 packs/day for 6.0 years (3.0 ttl pk-yrs)     Types: Cigarettes     Start date: 2008     Quit date: 2014     Years since quittin.0     Passive exposure: Past    Smokeless tobacco: Never   Vaping Use    Vaping status: Never Used   Substance and Sexual Activity    Alcohol use: Not Currently     Alcohol/week: 1.0 - 3.0 standard drink of alcohol     Types: 1 - 3 Glasses of wine per week    Drug use: Not Currently     Types: Marijuana    Sexual activity: Yes     Partners: Male     Birth control/protection: None      Allergies   Allergen Reactions    Cetirizine Hives       Review of Systems   Constitutional:  Negative for chills, fever and weight loss.   Cardiovascular:  Negative for chest pain.   Gastrointestinal:  Negative for abdominal pain and bowel incontinence.   Genitourinary:   Positive for flank pain. Negative for bladder incontinence, dysuria and pelvic pain.   Musculoskeletal:  Positive for back pain.   Neurological:  Positive for weakness, numbness, headaches and paresthesias. Negative for tingling.       Objective   Physical Exam  Vitals and nursing note reviewed.   Constitutional:       Appearance: She is well-developed.   HENT:      Head: Normocephalic.      Right Ear: External ear normal.      Left Ear: External ear normal.      Nose: Nose normal.   Eyes:      General: Lids are normal.      Conjunctiva/sclera: Conjunctivae normal.      Pupils: Pupils are equal, round, and reactive to light.   Neck:      Thyroid: No thyroid mass or thyromegaly.      Vascular: No carotid bruit.      Trachea: Trachea normal.   Cardiovascular:      Rate and Rhythm: Normal rate and regular rhythm.      Heart sounds: No murmur heard.  Pulmonary:      Effort: Pulmonary effort is normal. No respiratory distress.      Breath sounds: Normal breath sounds. No decreased breath sounds, wheezing, rhonchi or rales.   Chest:      Chest wall: No tenderness.   Abdominal:      General: Bowel sounds are normal.      Palpations: Abdomen is soft.      Tenderness: There is no abdominal tenderness. There is right CVA tenderness. There is no left CVA tenderness.   Musculoskeletal:         General: Normal range of motion.      Cervical back: Normal range of motion and neck supple.      Lumbar back: No spasms, tenderness or bony tenderness.        Back:       Comments: Tender right sciatic notch.     Skin:     General: Skin is warm and dry.   Neurological:      Mental Status: She is alert and oriented to person, place, and time.   Psychiatric:         Behavior: Behavior normal.         Assessment & Plan   Problems Addressed this Visit    None  Visit Diagnoses       Sciatica of right side    -  Primary    Relevant Medications    cyclobenzaprine (FLEXERIL) 5 MG tablet    Right flank pain        Relevant Orders    POC  Urinalysis Dipstick, Automated (Completed)          Diagnoses         Codes Comments    Sciatica of right side    -  Primary ICD-10-CM: M54.31  ICD-9-CM: 724.3     Right flank pain     ICD-10-CM: R10.9  ICD-9-CM: 789.09                        Current Outpatient Medications:     albuterol sulfate  (90 Base) MCG/ACT inhaler, Inhale 2 puffs Every 4 (Four) Hours As Needed for Wheezing., Disp: 18 g, Rfl: 0    ALPRAZolam (Xanax) 0.25 MG tablet, Take 1 tablet by mouth 2 (Two) Times a Day As Needed for Anxiety., Disp: 10 tablet, Rfl: 0    clonazePAM (KlonoPIN) 0.5 MG tablet, Take 1 tablet by mouth 2 (Two) Times a Day As Needed., Disp: , Rfl:     fluticasone (Flonase) 50 MCG/ACT nasal spray, 2 sprays into the nostril(s) as directed by provider Daily., Disp: 16 g, Rfl: 11    levothyroxine (SYNTHROID, LEVOTHROID) 112 MCG tablet, TAKE 1 TABLET BY MOUTH ONCE DAILY, Disp: 30 tablet, Rfl: 1    omeprazole (priLOSEC) 40 MG capsule, TAKE 1 CAPSULE BY MOUTH ONCE DAILY, Disp: 30 capsule, Rfl: 5    propranolol (INDERAL) 20 MG tablet, Take 1 tablet by mouth 2 (Two) Times a Day., Disp: 60 tablet, Rfl: 3    QUEtiapine (SEROquel) 25 MG tablet, TAKE 1/2 TO 1 TABLET BY MOUTH TWICE DAILY AS NEEDED FOR ANXIETY/PANIC, Disp: 60 tablet, Rfl: 1    rizatriptan MLT (MAXALT-MLT) 5 MG disintegrating tablet, Place 1 tablet on the tongue 1 (One) Time As Needed for Migraine. May repeat in 2 hours if needed, Disp: 9 tablet, Rfl: 2    cyclobenzaprine (FLEXERIL) 5 MG tablet, Take 1 tablet by mouth 3 (Three) Times a Day As Needed for Muscle Spasms., Disp: 30 tablet, Rfl: 0    Return if symptoms worsen or fail to improve, for Recheck.  Answers submitted by the patient for this visit:  Primary Reason for Visit (Submitted on 1/3/2025)  What is the primary reason for your visit?: Back Pain    Recent Results (from the past week)   POC Rapid Strep A    Collection Time: 12/30/24  5:13 PM    Specimen: Swab   Result Value Ref Range    Rapid Strep A Screen  Negative     Internal Control Passed     Lot Number [id number]     Expiration Date 12/11/26    POC Influenza A/B    Collection Time: 12/30/24  5:14 PM    Specimen: Swab   Result Value Ref Range    Rapid Influenza A Ag Negative Negative    Rapid Influenza B Ag Negative Negative    Internal Control Passed Passed    Lot Number [id number]     Expiration Date 10/11/26    POCT SARS-CoV-2 Antigen ([id number])    Collection Time: 12/30/24  5:20 PM    Specimen: Nasopharynx; Swab   Result Value Ref Range    SARS Antigen Not Detected Not Detected, Presumptive Negative    Internal Control Passed Passed    Lot Number [id number]     Expiration Date 5/6/25    POC Urinalysis Dipstick, Automated    Collection Time: 01/03/25  5:47 PM    Specimen: Urine   Result Value Ref Range    Color Yellow Yellow, Straw, Dark Yellow, Marly    Clarity, UA Clear Clear    Specific Gravity  1.025 1.005 - 1.030    pH, Urine 6.0 5.0 - 8.0    Leukocytes Negative Negative    Nitrite, UA Negative Negative    Protein, POC 1+ (A) Negative mg/dL    Glucose, UA Negative Negative mg/dL    Ketones, UA Negative Negative    Urobilinogen, UA Normal Normal, 0.2 E.U./dL    Bilirubin Negative Negative    Blood, UA Negative Negative    Lot Number [id number]     Expiration Date 4/10/2026

## 2025-01-04 ENCOUNTER — APPOINTMENT (OUTPATIENT)
Facility: HOSPITAL | Age: 30
End: 2025-01-04
Payer: COMMERCIAL

## 2025-01-04 ENCOUNTER — HOSPITAL ENCOUNTER (EMERGENCY)
Facility: HOSPITAL | Age: 30
Discharge: HOME | End: 2025-01-05
Attending: STUDENT IN AN ORGANIZED HEALTH CARE EDUCATION/TRAINING PROGRAM
Payer: COMMERCIAL

## 2025-01-04 VITALS
RESPIRATION RATE: 20 BRPM | OXYGEN SATURATION: 98 % | WEIGHT: 293 LBS | BODY MASS INDEX: 50.02 KG/M2 | DIASTOLIC BLOOD PRESSURE: 53 MMHG | HEART RATE: 73 BPM | HEIGHT: 64 IN | SYSTOLIC BLOOD PRESSURE: 142 MMHG | TEMPERATURE: 99.5 F

## 2025-01-04 DIAGNOSIS — R07.82 INTERCOSTAL PAIN: Primary | ICD-10-CM

## 2025-01-04 DIAGNOSIS — K21.9 GASTROESOPHAGEAL REFLUX DISEASE, UNSPECIFIED WHETHER ESOPHAGITIS PRESENT: ICD-10-CM

## 2025-01-04 LAB
ALBUMIN SERPL-MCNC: 4.2 G/DL (ref 3.5–5.2)
ALBUMIN/GLOB SERPL: 1.4 G/DL
ALP SERPL-CCNC: 75 U/L (ref 39–117)
ALT SERPL W P-5'-P-CCNC: 37 U/L (ref 1–33)
ANION GAP SERPL CALCULATED.3IONS-SCNC: 13.3 MMOL/L (ref 5–15)
AST SERPL-CCNC: 30 U/L (ref 1–32)
BASOPHILS # BLD AUTO: 0.01 10*3/MM3 (ref 0–0.2)
BASOPHILS NFR BLD AUTO: 0.1 % (ref 0–1.5)
BILIRUB SERPL-MCNC: 0.2 MG/DL (ref 0–1.2)
BUN SERPL-MCNC: 13 MG/DL (ref 6–20)
BUN/CREAT SERPL: 17.6 (ref 7–25)
CALCIUM SPEC-SCNC: 9 MG/DL (ref 8.6–10.5)
CHLORIDE SERPL-SCNC: 105 MMOL/L (ref 98–107)
CO2 SERPL-SCNC: 22.7 MMOL/L (ref 22–29)
CREAT SERPL-MCNC: 0.74 MG/DL (ref 0.57–1)
CRP SERPL-MCNC: 0.61 MG/DL (ref 0–0.5)
DEPRECATED RDW RBC AUTO: 40.3 FL (ref 37–54)
EGFRCR SERPLBLD CKD-EPI 2021: 111.1 ML/MIN/1.73
EOSINOPHIL # BLD AUTO: 0.04 10*3/MM3 (ref 0–0.4)
EOSINOPHIL NFR BLD AUTO: 0.5 % (ref 0.3–6.2)
ERYTHROCYTE [DISTWIDTH] IN BLOOD BY AUTOMATED COUNT: 13.2 % (ref 12.3–15.4)
GLOBULIN UR ELPH-MCNC: 3 GM/DL
GLUCOSE SERPL-MCNC: 99 MG/DL (ref 65–99)
HCT VFR BLD AUTO: 35.7 % (ref 34–46.6)
HGB BLD-MCNC: 12.6 G/DL (ref 12–15.9)
HOLD SPECIMEN: NORMAL
IMM GRANULOCYTES # BLD AUTO: 0.03 10*3/MM3 (ref 0–0.05)
IMM GRANULOCYTES NFR BLD AUTO: 0.4 % (ref 0–0.5)
LIPASE SERPL-CCNC: 40 U/L (ref 13–60)
LYMPHOCYTES # BLD AUTO: 2.73 10*3/MM3 (ref 0.7–3.1)
LYMPHOCYTES NFR BLD AUTO: 32.5 % (ref 19.6–45.3)
MCH RBC QN AUTO: 29.4 PG (ref 26.6–33)
MCHC RBC AUTO-ENTMCNC: 35.3 G/DL (ref 31.5–35.7)
MCV RBC AUTO: 83.2 FL (ref 79–97)
MONOCYTES # BLD AUTO: 0.54 10*3/MM3 (ref 0.1–0.9)
MONOCYTES NFR BLD AUTO: 6.4 % (ref 5–12)
NEUTROPHILS NFR BLD AUTO: 5.05 10*3/MM3 (ref 1.7–7)
NEUTROPHILS NFR BLD AUTO: 60.1 % (ref 42.7–76)
PLATELET # BLD AUTO: 236 10*3/MM3 (ref 140–450)
PMV BLD AUTO: 10.1 FL (ref 6–12)
POTASSIUM SERPL-SCNC: 3.8 MMOL/L (ref 3.5–5.2)
PROT SERPL-MCNC: 7.2 G/DL (ref 6–8.5)
QT INTERVAL: 370 MS
QTC INTERVAL: 405 MS
RBC # BLD AUTO: 4.29 10*6/MM3 (ref 3.77–5.28)
SODIUM SERPL-SCNC: 141 MMOL/L (ref 136–145)
TROPONIN T SERPL HS-MCNC: <6 NG/L
WBC NRBC COR # BLD AUTO: 8.4 10*3/MM3 (ref 3.4–10.8)
WHOLE BLOOD HOLD COAG: NORMAL
WHOLE BLOOD HOLD SPECIMEN: NORMAL

## 2025-01-04 PROCEDURE — 84484 ASSAY OF TROPONIN QUANT: CPT

## 2025-01-04 PROCEDURE — 96374 THER/PROPH/DIAG INJ IV PUSH: CPT

## 2025-01-04 PROCEDURE — 71046 X-RAY EXAM CHEST 2 VIEWS: CPT

## 2025-01-04 PROCEDURE — 93005 ELECTROCARDIOGRAM TRACING: CPT | Performed by: STUDENT IN AN ORGANIZED HEALTH CARE EDUCATION/TRAINING PROGRAM

## 2025-01-04 PROCEDURE — 86140 C-REACTIVE PROTEIN: CPT

## 2025-01-04 PROCEDURE — 85025 COMPLETE CBC W/AUTO DIFF WBC: CPT

## 2025-01-04 PROCEDURE — 80053 COMPREHEN METABOLIC PANEL: CPT

## 2025-01-04 PROCEDURE — 96375 TX/PRO/DX INJ NEW DRUG ADDON: CPT

## 2025-01-04 PROCEDURE — 99284 EMERGENCY DEPT VISIT MOD MDM: CPT

## 2025-01-04 PROCEDURE — 83690 ASSAY OF LIPASE: CPT

## 2025-01-04 RX ORDER — KETOROLAC TROMETHAMINE 30 MG/ML
30 INJECTION, SOLUTION INTRAMUSCULAR; INTRAVENOUS ONCE
Status: DISCONTINUED | OUTPATIENT
Start: 2025-01-05 | End: 2025-01-05

## 2025-01-04 RX ORDER — LIDOCAINE HYDROCHLORIDE 20 MG/ML
5 SOLUTION OROPHARYNGEAL ONCE
Status: COMPLETED | OUTPATIENT
Start: 2025-01-04 | End: 2025-01-04

## 2025-01-04 RX ORDER — SODIUM CHLORIDE 0.9 % (FLUSH) 0.9 %
10 SYRINGE (ML) INJECTION AS NEEDED
Status: DISCONTINUED | OUTPATIENT
Start: 2025-01-04 | End: 2025-01-05 | Stop reason: HOSPADM

## 2025-01-04 RX ORDER — DEXAMETHASONE SODIUM PHOSPHATE 10 MG/ML
10 INJECTION INTRAMUSCULAR; INTRAVENOUS ONCE
Status: DISCONTINUED | OUTPATIENT
Start: 2025-01-05 | End: 2025-01-05

## 2025-01-04 RX ORDER — PANTOPRAZOLE SODIUM 40 MG/10ML
40 INJECTION, POWDER, LYOPHILIZED, FOR SOLUTION INTRAVENOUS ONCE
Status: COMPLETED | OUTPATIENT
Start: 2025-01-04 | End: 2025-01-04

## 2025-01-04 RX ORDER — OMEPRAZOLE 40 MG/1
40 CAPSULE, DELAYED RELEASE ORAL DAILY
Qty: 30 CAPSULE | Refills: 0 | Status: SHIPPED | OUTPATIENT
Start: 2025-01-04 | End: 2025-01-05

## 2025-01-04 RX ORDER — PREDNISONE 20 MG/1
20 TABLET ORAL DAILY
Qty: 5 TABLET | Refills: 0 | Status: SHIPPED | OUTPATIENT
Start: 2025-01-04 | End: 2025-01-05

## 2025-01-04 RX ORDER — NAPROXEN SODIUM 550 MG/1
550 TABLET ORAL 2 TIMES DAILY WITH MEALS
Qty: 28 TABLET | Refills: 0 | Status: SHIPPED | OUTPATIENT
Start: 2025-01-04 | End: 2025-01-05

## 2025-01-04 RX ADMIN — PANTOPRAZOLE SODIUM 40 MG: 40 INJECTION, POWDER, FOR SOLUTION INTRAVENOUS at 22:54

## 2025-01-04 RX ADMIN — LIDOCAINE HYDROCHLORIDE 5 ML: 20 SOLUTION ORAL at 22:55

## 2025-01-05 PROCEDURE — 25010000002 DEXAMETHASONE PER 1 MG

## 2025-01-05 PROCEDURE — 96375 TX/PRO/DX INJ NEW DRUG ADDON: CPT

## 2025-01-05 PROCEDURE — 25010000002 KETOROLAC TROMETHAMINE PER 15 MG

## 2025-01-05 PROCEDURE — 96374 THER/PROPH/DIAG INJ IV PUSH: CPT

## 2025-01-05 RX ORDER — DEXAMETHASONE SODIUM PHOSPHATE 10 MG/ML
10 INJECTION INTRAMUSCULAR; INTRAVENOUS ONCE
Status: COMPLETED | OUTPATIENT
Start: 2025-01-05 | End: 2025-01-05

## 2025-01-05 RX ORDER — KETOROLAC TROMETHAMINE 15 MG/ML
15 INJECTION, SOLUTION INTRAMUSCULAR; INTRAVENOUS ONCE
Status: COMPLETED | OUTPATIENT
Start: 2025-01-05 | End: 2025-01-05

## 2025-01-05 RX ORDER — PREDNISONE 20 MG/1
20 TABLET ORAL DAILY
Qty: 5 TABLET | Refills: 0 | Status: SHIPPED | OUTPATIENT
Start: 2025-01-05 | End: 2025-01-10

## 2025-01-05 RX ORDER — OMEPRAZOLE 40 MG/1
40 CAPSULE, DELAYED RELEASE ORAL DAILY
Qty: 30 CAPSULE | Refills: 0 | Status: SHIPPED | OUTPATIENT
Start: 2025-01-05

## 2025-01-05 RX ORDER — NAPROXEN SODIUM 550 MG/1
550 TABLET ORAL 2 TIMES DAILY WITH MEALS
Qty: 28 TABLET | Refills: 0 | Status: SHIPPED | OUTPATIENT
Start: 2025-01-05 | End: 2025-01-19

## 2025-01-05 RX ADMIN — KETOROLAC TROMETHAMINE 15 MG: 15 INJECTION, SOLUTION INTRAMUSCULAR; INTRAVENOUS at 00:14

## 2025-01-05 RX ADMIN — DEXAMETHASONE SODIUM PHOSPHATE 10 MG: 10 INJECTION INTRAMUSCULAR; INTRAVENOUS at 00:14

## 2025-01-05 NOTE — FSED PROVIDER NOTE
"CHIEF COMPLAINT  Chest Pain     HISTORY OF PRESENT ILLNESS:  Lilly Segovia is a 29 y.o. female who presents with midsternal sharp chest pain since yesterday.  She states that she had the flu 2 weeks ago and is just recovering 1 week ago.  She does have a history of GERD and cholelithiasis.  She takes omeprazole daily.  She denies nausea, vomiting, shortness of breath, abdominal pain, diarrhea or constipation.  She has no known coronary artery disease.  She does have a strong family history of cardiac disease.  She has never had a blood clot before, no history of hypercoagulopathy, does not take hormone replacement      PAST MEDICAL HISTORY  Past Medical History:   Diagnosis Date    ADHD (attention deficit hyperactivity disorder)     Arthritis     Asthma     COVID 02/15/2022    Dyspepsia     Dysphagia     w/ meds (must crush)    Dysthymic disorder 05/03/2021    Formatting of this note might be different from the original.  Saw counselors and took medications as a teenager. Was in a mental hospital at one point. Unsure what the diagnoses were. States all medications (lexapro, paxil, prozac) made her feel like a zombie.  In early 2020, tried to take citalopram liquid for 1 week, had side effects (felt drugged up). Stopped medication. Feels like anxiety com    Fatigue     Gallstones     CT 1/2023    GERD (gastroesophageal reflux disease)     on PPI, denies prior eval    Gestational hypertension     Hypothyroidism     Kidney agenesis 04/19/2021    Formatting of this note might be different from the original.  Went to ER in April 2021 for pain, had CT scan which showed lack of a kidney.    Migraine 03/21/2019    Topamax w/ prn Tylenol + caffeine    Postoperative nausea     \"extreme nuasea\" after CSY    Preeclampsia 2019    PTSD (post-traumatic stress disorder) 12/01/2020    Formatting of this note might be different from the original.  Prior panic attacks.  Went on trip last month and had some anxiety over it.  Worried " about a possible COVID exposure but, did not come down with it.   Feels like her anxiety is getting worse.  She is not working, she's at home all day with her daughter and the dog.  She feels like her anxiety is turning into rage, like she needs to yel    Sleep apnea     CPAP nightly     Reviewed the imported nursing notes    PAST SURGICAL HISTORY  Past Surgical History:   Procedure Laterality Date    COLONOSCOPY N/A 03/23/2023    Procedure: COLONOSCOPY;  Surgeon: Eder Fragoso MD;  Location:  [facility];  Service: Gastroenterology;  Laterality: N/A;    ENDOSCOPY N/A 12/16/2024    Procedure: ESOPHAGOGASTRODUODENOSCOPY WITH BIOPSY;  Surgeon: Jenelle Combs MD;  Location:  [facility];  Service: General;  Laterality: N/A;    WISDOM TOOTH EXTRACTION N/A 2010     Reviewed the imported nursing notes    ALLERGIES  Allergies   Allergen Reactions    Cetirizine Hives       MEDICATIONS       Medication List        START taking these medications      naproxen sodium 550 MG tablet  Commonly known as: ANAPROX  Take 1 tablet by mouth 2 (Two) Times a Day With Meals for 14 days.     predniSONE 20 MG tablet  Commonly known as: DELTASONE  Take 1 tablet by mouth Daily for 5 days.            CONTINUE taking these medications      albuterol sulfate  (90 Base) MCG/ACT inhaler  Commonly known as: PROVENTIL HFA;VENTOLIN HFA;PROAIR HFA  Inhale 2 puffs Every 4 (Four) Hours As Needed for Wheezing.     ALPRAZolam 0.25 MG tablet  Commonly known as: Xanax  Take 1 tablet by mouth 2 (Two) Times a Day As Needed for Anxiety.     clonazePAM 0.5 MG tablet  Commonly known as: KlonoPIN     cyclobenzaprine 5 MG tablet  Commonly known as: FLEXERIL  Take 1 tablet by mouth 3 (Three) Times a Day As Needed for Muscle Spasms.     fluticasone 50 MCG/ACT nasal spray  Commonly known as: Flonase  2 sprays into the nostril(s) as directed by provider Daily.     levothyroxine 112 MCG tablet  Commonly known as: SYNTHROID,  LEVOTHROID  TAKE 1 TABLET BY MOUTH ONCE DAILY     omeprazole 40 MG capsule  Commonly known as: priLOSEC  Take 1 capsule by mouth Daily.     propranolol 20 MG tablet  Commonly known as: INDERAL  Take 1 tablet by mouth 2 (Two) Times a Day.     QUEtiapine 25 MG tablet  Commonly known as: SEROquel  TAKE 1/2 TO 1 TABLET BY MOUTH TWICE DAILY AS NEEDED FOR ANXIETY/PANIC     rizatriptan MLT 5 MG disintegrating tablet  Commonly known as: MAXALT-MLT  Place 1 tablet on the tongue 1 (One) Time As Needed for Migraine. May repeat in 2 hours if needed               Where to Get Your Medications        These medications were sent to [facility] #[id number] - [place], [place] - [address]  - [phone]  - [phone]   [address] [facility] [address]      Hours: 24-hours Phone: [phone]   naproxen sodium 550 MG tablet  omeprazole 40 MG capsule  predniSONE 20 MG tablet       SOCIAL HISTORY    Social History     Tobacco Use    Smoking status: Former     Current packs/day: 0.00     Average packs/day: 0.5 packs/day for 6.0 years (3.0 ttl pk-yrs)     Types: Cigarettes     Start date: 2008     Quit date: 2014     Years since quittin.0     Passive exposure: Past    Smokeless tobacco: Never   Vaping Use    Vaping status: Never Used   Substance Use Topics    Alcohol use: Not Currently     Alcohol/week: 1.0 - 3.0 standard drink of alcohol     Types: 1 - 3 Glasses of wine per week    Drug use: Not Currently     Types: Marijuana     The patient otherwise denies any further social history.  Reviewed the imported nursing notes      FAMILY HISTORY  Family History   Problem Relation Age of Onset    Anxiety disorder Mother     Depression Mother     Hypertension Mother     Diabetes Mother     Diverticulitis Mother     Pancreatitis Mother     Cholelithiasis Mother     Asthma Mother     Anxiety disorder Maternal Grandmother     Depression Maternal Grandmother     Obesity Maternal Grandmother     Diabetes Maternal  "Grandmother     Hypertension Maternal Grandmother     Heart disease Maternal Grandmother 70        cause of death    Sleep apnea Maternal Grandmother     Kidney failure Maternal Grandmother     Depression Maternal Aunt     Anxiety disorder Maternal Aunt      The patient otherwise patient denies any further family history.  Reviewed the imported nursing notes      REVIEW OF SYSTEMS  Reviewed and negative/not pertinent unless mentioned in the HPI        PHYSICAL EXAM  Vitals: /53   Pulse 73   Temp 99.5 °F (37.5 °C) (Oral)   Resp 20   Ht 163 cm (64.17\")   Wt (!) 150 kg (330 lb 11 oz)   LMP 12/16/2024   SpO2 98%   BMI 56.46 kg/m²      General Appearance: Awake and Alert, cooperative, no distress   Head:  Normocephalic, atraumatic   Eyes: Conjunctiva clear, corneas clear   Ears:  Normal external ears,   Nose: Nares normal and clear   Throat: Lips, mucosa moist   Neck:  Trachea midline, no stridor   Lungs:  Clear to auscultation bilaterally, respirations unlabored   Chest    Heart: No obvious deformity, no flail chest, there is tenderness palpation to the sternum,  Regular, No rub   Abdomen: Nondistended, non tender   Genitourinary:  Pelvic:  Rectal: Not Performed  Not Performed  Not Performed   Extremities: Extremities Atraumatic   Vascular: Present in all extremities   Skin:  no rashes or lesions   Neurologic: Non Focal , CN 2-12 grossly intact, GCS 15   Psyc: Not Performed         MEDICAL DECISION MAKING - ED COURSE/PROCEDURE/DDX:    PULSE OX: Interpretation by me on room air Is normal  SpO2 Readings from Last 1 Encounters:   01/04/25 98%          CARDIAC MONITOR: Normal sinus rhythm  Pulse Readings from Last 1 Encounters:   01/04/25 73            I reviewed the nursing notes: YES  I reviewed and discussed with EMS:   External documents reviewed:   Additional history taken from: Previous notes     Discussed with consulting physician  additionally, the admitting / accepting provider was contacted with " handoff      LAB REVIEWED: Interpretation of all unique test ordered  Labs Reviewed   COMPREHENSIVE METABOLIC PANEL - Abnormal; Notable for the following components:       Result Value    ALT (SGPT) 37 (*)     All other components within normal limits    Narrative:     GFR Categories in Chronic Kidney Disease (CKD)      GFR Category          GFR (mL/min/1.73)    Interpretation  G1                     90 or greater         Normal or high (1)  G2                      60-89                Mild decrease (1)  G3a                   45-59                Mild to moderate decrease  G3b                   30-44                Moderate to severe decrease  G4                    15-29                Severe decrease  G5                    14 or less           Kidney failure          (1)In the absence of evidence of kidney disease, neither GFR category G1 or G2 fulfill the criteria for CKD.    eGFR calculation 2021 CKD-EPI creatinine equation, which does not include race as a factor   C-REACTIVE PROTEIN - Abnormal; Notable for the following components:    C-Reactive Protein 0.61 (*)     All other components within normal limits   LIPASE - Normal   TROPONIN - Normal   CBC WITH AUTO DIFFERENTIAL - Normal   RAINBOW DRAW    Narrative:     The following orders were created for panel order [facility] Draw.  Procedure                               Abnormality         Status                     ---------                               -----------         ------                     Green Top (Gel)[[id number]]                                  Final result               Lavender Top[[id number]]                                     Final result               Gold Top - SST[[id number]]                                   Final result               Huynh Top[[id number]]                                         Final result               Light Blue Top[[id number]]                                   Final result                 Please view results for these  tests on the individual orders.   CBC AND DIFFERENTIAL    Narrative:     The following orders were created for panel order CBC & Differential.  Procedure                               Abnormality         Status                     ---------                               -----------         ------                     CBC Auto Differential[[id number]]        Normal              Final result                 Please view results for these tests on the individual orders.   GREEN TOP   LAVENDER TOP   GOLD TOP - SST   GRAY TOP   LIGHT BLUE TOP       IMAGING REVIEWED  My X-ray interpretation no acute findings  My CT interpretation:   My Ultrasound interpretation:   XR Chest 2 View   Final Result   Impression:   No active disease.               Electronically Signed: Familia Jordan MD     1/4/2025 11:10 PM EST     Workstation ID: [id number]          Procedures:    Decision rules/scores:    PERC CRITERIA    Age >=50                                No 0     HR >=100                               No 0     O? sat on room air <95%      No 0     Unilateral leg swelling           No 0     Hemoptysis                           No 0     Surgery or trauma <=4 weeks ago requiring treatment with general anesthesia                                                No 0     Prior PE or DVT                    No 0     Hormone use : Oral contraceptives, hormone replacement or estrogenic hormones use in males  or female patients                                                No 0    TOTAL = 0    If any criteria are positive, the PERC rule is not satisfied and cannot be used to rule out PE in  this patient.  The original article from 2004 was a prospective study with a derivation section and a validation  section.  3148 patients from 10 sites were included in the derivation.  21 potential variables were included for analysis, with the 8 final variables selected from these.  1427 low-risk and 382 very low-risk patients from 2 sites were included in the  "validation section.  In low-risk patients there was a sensitivity of 96% and specificity of 27%.  In very low-risk patients there was a sensitivity of 100% and specificity of 15%.  The false negative rate at 90 days in low-risk patients was 1.4% which is below the 1.8% testing  threshold.  A second multicenter validation was done in 2008. This expanded upon the initial validation  study and defined low pretest probability as <15%  8138 patients from 13 sites were included in the study. Some of these sites were included in the  initial paper.  Clinical gestalt for a pretest probability of <15%, 15-40% or >40% was collected from the  providers.  20% of the cohort was deemed low risk (<15%)  For patients who were PERC negative and pre-test probability was <15% the false negative rate  at 45 days was 1.0% with a sensitivity of 97.4% and specificity of 21.9%.              Drug therapy provided in the ED and monitored as needed given IV/PO :   Medications   sodium chloride 0.9 % flush 10 mL (has no administration in time range)   Lidocaine Viscous HCl (XYLOCAINE) 2 % solution 5 mL (5 mL Mouth/Throat Given 1/4/25 2255)   pantoprazole (PROTONIX) injection 40 mg (40 mg Intravenous Given 1/4/25 2254)   ketorolac (TORADOL) injection 15 mg (15 mg Intravenous Given 1/5/25 0014)   dexAMETHasone (DECADRON) injection 10 mg (10 mg Intravenous Given 1/5/25 0014)       Vitals Trend:  Vitals:    01/04/25 2220   BP: 142/53   Pulse: 73   Resp: 20   Temp: 99.5 °F (37.5 °C)   TempSrc: Oral   SpO2: 98%   Weight: (!) 150 kg (330 lb 11 oz)   Height: 163 cm (64.17\")       Lilly Segovia is a 29 y.o. female who presents to the emergency department with the acute illness as stated within HPI  Shared medical decision making regarding a detailed discussion with the patient concerning this ED encounter, the differential diagnosis considered acute coronary syndrome, gastritis, costochondritis, anxiety, esophagitis, cholecystitis, pulmonary emboli, " and the emergency room imaging and lab testing done today The patient and/or family have been given an opportunity to ask questions and exhibit an understanding of what happened today in the emergency room.        ED Course as of 01/05/25 0107   Sun Jan 05, 2025   0006 EKG interpretation 2218: Sinus rhythm, rate of 72, QT and QRS intervals within normal limits, normal axis, no STEMI [LB]   0009 Patient's heart score is 0.  Low risk.  Troponin negative.  Chest x-ray was clear.  Remaining labs are also unremarkable.  Most suspicious for  postviral syndrome costochondritis.  The Pepcid and viscous lidocaine did not improve her pain.  Toradol Decadron did seem to improve it.  Will continue NSAIDs.  Increased her PPI to cover for gastritis.  I will follow-up with her primary doctor later this week for repeat evaluation. [NC]      ED Course User Index  [LB] Anabell Evans MD  [NC] Jorge Gomez PA-C           Condition considered emergent due to:  1) Acuity  2) Failure or unavailable treatment in the outpatient setting  3) Risk of deterioration and decompensation given the multiple differential diagnoses that  need to be ruled out      Amount and/or Complexity of Data Reviewed  Clinical lab tests: as above noted in MDM  Tests in the radiology section of CPT®: as above noted in MDM  Tests in the medicine section of CPT®: as above noted in MDM  Independent visualization of images, tracings, or specimens: as above noted in MDM        CLINICAL IMPRESSION:  Final diagnoses:   Intercostal pain      DISPOSITION:  Discharge      As with my usual standard practice patient was advised to return for any reason for any  complaint at any time whatsoever. Please refer to the discharge instructions, AVS paperwork  and prescriptions written for treatment plan.        Electronically signed by Jorge Gomez PA-C, 01/04/25, 10:52 PM EST.      This report was dictated utilizing a voice recognition system which has a propensity  to miss  small words such as a, an, the, no, he,she,him, her,his and not. Please read this report carefully,  and if any discrepancy or uncertainty is present, please contact the author directly for  clarification

## 2025-01-13 LAB
QT INTERVAL: 370 MS
QTC INTERVAL: 405 MS

## 2025-01-15 ENCOUNTER — TELEPHONE (OUTPATIENT)
Dept: INTERNAL MEDICINE | Facility: CLINIC | Age: 30
End: 2025-01-15
Payer: COMMERCIAL

## 2025-01-15 DIAGNOSIS — M54.41 ACUTE MIDLINE LOW BACK PAIN WITH RIGHT-SIDED SCIATICA: Primary | ICD-10-CM

## 2025-01-15 NOTE — TELEPHONE ENCOUNTER
Caller: Lilly Segovia    Relationship: Self    Best call back number: [phone]     What is the medical concern/diagnosis: BACK PAIN    What specialty or service is being requested: PHYSICAL THERAPY    What is the provider, practice or medical service name: N/A      Any additional details: MUST TAKE PATIENTS INSURANCE

## 2025-01-28 ENCOUNTER — OFFICE VISIT (OUTPATIENT)
Dept: INTERNAL MEDICINE | Facility: CLINIC | Age: 30
End: 2025-01-28
Payer: COMMERCIAL

## 2025-01-28 VITALS
SYSTOLIC BLOOD PRESSURE: 118 MMHG | HEIGHT: 64 IN | WEIGHT: 293 LBS | DIASTOLIC BLOOD PRESSURE: 78 MMHG | TEMPERATURE: 97.3 F | BODY MASS INDEX: 50.02 KG/M2 | RESPIRATION RATE: 16 BRPM | OXYGEN SATURATION: 96 % | HEART RATE: 68 BPM

## 2025-01-28 DIAGNOSIS — F41.0 PANIC DISORDER (EPISODIC PAROXYSMAL ANXIETY): ICD-10-CM

## 2025-01-28 DIAGNOSIS — M54.31 SCIATICA OF RIGHT SIDE: ICD-10-CM

## 2025-01-28 DIAGNOSIS — G43.009 MIGRAINE WITHOUT AURA AND WITHOUT STATUS MIGRAINOSUS, NOT INTRACTABLE: ICD-10-CM

## 2025-01-28 DIAGNOSIS — F41.1 GENERALIZED ANXIETY DISORDER: Primary | ICD-10-CM

## 2025-01-28 PROCEDURE — 1159F MED LIST DOCD IN RCRD: CPT | Performed by: NURSE PRACTITIONER

## 2025-01-28 PROCEDURE — 1125F AMNT PAIN NOTED PAIN PRSNT: CPT | Performed by: NURSE PRACTITIONER

## 2025-01-28 PROCEDURE — 99214 OFFICE O/P EST MOD 30 MIN: CPT | Performed by: NURSE PRACTITIONER

## 2025-01-28 PROCEDURE — 1160F RVW MEDS BY RX/DR IN RCRD: CPT | Performed by: NURSE PRACTITIONER

## 2025-01-28 RX ORDER — RIZATRIPTAN BENZOATE 5 MG/1
5 TABLET, ORALLY DISINTEGRATING ORAL ONCE AS NEEDED
Qty: 9 TABLET | Refills: 2 | Status: SHIPPED | OUTPATIENT
Start: 2025-01-28

## 2025-01-28 RX ORDER — CYCLOBENZAPRINE HCL 5 MG
5 TABLET ORAL 3 TIMES DAILY PRN
Qty: 30 TABLET | Refills: 0 | Status: SHIPPED | OUTPATIENT
Start: 2025-01-28

## 2025-01-28 NOTE — PROGRESS NOTES
Lilly Segovia presents to [facility] for     Chief Complaint  Chief Complaint   Patient presents with    Anxiety    Depression     Seeing behavioral health now,has appt next Monday for FU there    Palpitations     Patient states she took propanolol for several days then quit and forgot to take it. States palpitations are better        PCP:  Dior Bach APRN    Subjective        History of Present Illness    Seeing behavioral health for her anxiety and depresson now.   The panic is a lot better.   More depressed  no hi/si   See's behavioral health again Monday.   They put her on clonazepam. We will d/c her xanax. Not having to use either.       Palpitations- getting better. Took propranolol for a few days then stopped because she forgot.   Once every couple of days.     Migraines- needs refill on her maxalt.   Tolerates well.       Bariatrics needs her to have once more weight loss visit.   Diet: Decreased appetite/intake. Not eating healthy, trying to increase protein. Drinking fairlife protein shakes.    Exercise: not much, walking a little   Wt Readings from Last 3 Encounters:   01/28/25 (!) 151 kg (332 lb 3.2 oz)   01/04/25 (!) 150 kg (330 lb 11 oz)   01/03/25 (!) 150 kg (330 lb)         Health Maintenance:   Health Maintenance   Topic Date Due    ANNUAL PHYSICAL  08/13/2025    PAP SMEAR  08/22/2025    BMI FOLLOWUP  01/15/2026    TDAP/TD VACCINES (5 - Td or Tdap) 11/05/2029    HEPATITIS C SCREENING  Completed    COVID-19 Vaccine  Completed    Pneumococcal Vaccine 0-49  Completed    INFLUENZA VACCINE  Completed               Allergies   Allergen Reactions    Cetirizine Hives     Current Outpatient Medications on File Prior to Visit   Medication Sig Dispense Refill    albuterol sulfate  (90 Base) MCG/ACT inhaler Inhale 2 puffs Every 4 (Four) Hours As Needed for Wheezing. 18 g 0    clonazePAM (KlonoPIN) 0.5 MG tablet Take 1 tablet by mouth 2 (Two) Times a Day As  "Needed.      fluticasone (Flonase) 50 MCG/ACT nasal spray 2 sprays into the nostril(s) as directed by provider Daily. 16 g 11    levothyroxine (SYNTHROID, LEVOTHROID) 112 MCG tablet TAKE 1 TABLET BY MOUTH ONCE DAILY 30 tablet 1    omeprazole (priLOSEC) 40 MG capsule Take 1 capsule by mouth Daily. 30 capsule 0    QUEtiapine (SEROquel) 25 MG tablet TAKE 1/2 TO 1 TABLET BY MOUTH TWICE DAILY AS NEEDED FOR ANXIETY/PANIC 60 tablet 1    propranolol (INDERAL) 20 MG tablet Take 1 tablet by mouth 2 (Two) Times a Day. (Patient not taking: Reported on 1/28/2025) 60 tablet 3     No current facility-administered medications on file prior to visit.         The following portions of the patient's history were reviewed and updated as appropriate: allergies, current medications, past family history, past medical history, past social history, past surgical history and problem list and are available for review within electronic record    Objective         Vital Signs:   /78 (BP Location: Right arm, Patient Position: Sitting, Cuff Size: Large Adult)   Pulse 68   Temp 97.3 °F (36.3 °C) (Infrared)   Resp 16   Ht 163 cm (64.17\")   Wt (!) 151 kg (332 lb 3.2 oz)   SpO2 96%   BMI 56.72 kg/m²         Physical Exam  Vitals and nursing note reviewed.   Constitutional:       General: She is not in acute distress.     Appearance: Normal appearance. She is well-developed. She is not ill-appearing or diaphoretic.   HENT:      Head: Normocephalic and atraumatic.   Eyes:      General: No scleral icterus.     Conjunctiva/sclera: Conjunctivae normal.   Neck:      Vascular: No JVD.   Cardiovascular:      Rate and Rhythm: Normal rate and regular rhythm.      Chest Wall: PMI is not displaced. No thrill.      Heart sounds: Normal heart sounds. No murmur heard.  Pulmonary:      Effort: Pulmonary effort is normal. No accessory muscle usage or respiratory distress.      Breath sounds: Normal breath sounds.   Chest:      Chest wall: No tenderness. "   Abdominal:      General: Bowel sounds are normal. There is no distension.      Palpations: Abdomen is soft.      Tenderness: There is no abdominal tenderness.   Musculoskeletal:         General: Normal range of motion.      Cervical back: Normal range of motion.      Right lower leg: No edema.      Left lower leg: No edema.   Skin:     General: Skin is warm and dry.      Coloration: Skin is not ashen, jaundiced, mottled or pale.      Findings: No erythema.   Neurological:      General: No focal deficit present.      Mental Status: She is alert and oriented to person, place, and time.   Psychiatric:         Attention and Perception: Attention normal.         Mood and Affect: Mood and affect normal.         Behavior: Behavior normal. Behavior is cooperative.         Thought Content: Thought content normal.         Cognition and Memory: Cognition normal.         Judgment: Judgment normal.                 Assessment and Plan      Diagnoses and all orders for this visit:    1. Generalized anxiety disorder (Primary)    2. Migraine without aura and without status migrainosus, not intractable  -     rizatriptan MLT (MAXALT-MLT) 5 MG disintegrating tablet; Place 1 tablet on the tongue 1 (One) Time As Needed for Migraine. May repeat in 2 hours if needed  Dispense: 9 tablet; Refill: 2    3. Sciatica of right side  -     cyclobenzaprine (FLEXERIL) 5 MG tablet; Take 1 tablet by mouth 3 (Three) Times a Day As Needed for Muscle Spasms.  Dispense: 30 tablet; Refill: 0    4. Panic disorder (episodic paroxysmal anxiety)    5. BMI 50.0-59.9, adult     Cont to follow with behavioral health for mgmt of mood disorders.   Maxalt PRN for migraines- AE's discussed.   Flexeril PRN.   Cont weight loss efforts and FU with bariatrics.     Follow Up     Patient was given instructions and counseling regarding her condition or for health maintenance advice. Please see specific information pulled into the AVS if appropriate.   Any new  medication's adverse effects have been discussed in detail with patient  Patient was encouraged to keep me informed of any acute changes, lack of improvement, or any new concerning symptoms.    Return in about 3 months (around 4/28/2025) for chronic condition follow up.          Dictated Utilizing Dragon Dictation   Please note that portions of this note were completed with a voice recognition program.   Part of this note may be an electronic transcription/translation of spoken language to printed text using the Dragon Dictation System.

## 2025-02-18 DIAGNOSIS — K21.9 GASTROESOPHAGEAL REFLUX DISEASE, UNSPECIFIED WHETHER ESOPHAGITIS PRESENT: ICD-10-CM

## 2025-02-19 RX ORDER — OMEPRAZOLE 40 MG/1
40 CAPSULE, DELAYED RELEASE ORAL DAILY
Qty: 90 CAPSULE | Refills: 1 | Status: SHIPPED | OUTPATIENT
Start: 2025-02-19

## 2025-03-04 DIAGNOSIS — E03.9 ACQUIRED HYPOTHYROIDISM: ICD-10-CM

## 2025-03-05 RX ORDER — LEVOTHYROXINE SODIUM 112 UG/1
TABLET ORAL
Qty: 30 TABLET | Refills: 1 | Status: SHIPPED | OUTPATIENT
Start: 2025-03-05

## 2025-03-05 NOTE — TELEPHONE ENCOUNTER
Rx Refill Note  Requested Prescriptions     Pending Prescriptions Disp Refills    levothyroxine (SYNTHROID, LEVOTHROID) 112 MCG tablet [Pharmacy Med Name: LEVOTHYROXINE SODIUM 112  Tablet] 30 tablet 1     Sig: TAKE 1 TABLET BY MOUTH ONCE DAILY      Last office visit with prescribing clinician: 1/3/2025   Last telemedicine visit with prescribing clinician: 11/6/2024   Next office visit with prescribing clinician: Visit date not found                         Would you like a call back once the refill request has been completed: [] Yes [] No    If the office needs to give you a call back, can they leave a voicemail: [] Yes [] No    Rose Deras  03/05/25, 13:55 EST

## 2025-03-14 ENCOUNTER — TELEPHONE (OUTPATIENT)
Dept: INTERNAL MEDICINE | Facility: CLINIC | Age: 30
End: 2025-03-14
Payer: COMMERCIAL

## 2025-03-14 ENCOUNTER — APPOINTMENT (OUTPATIENT)
Facility: HOSPITAL | Age: 30
End: 2025-03-14
Payer: COMMERCIAL

## 2025-03-14 ENCOUNTER — HOSPITAL ENCOUNTER (EMERGENCY)
Facility: HOSPITAL | Age: 30
Discharge: HOME | End: 2025-03-14
Attending: STUDENT IN AN ORGANIZED HEALTH CARE EDUCATION/TRAINING PROGRAM
Payer: COMMERCIAL

## 2025-03-14 VITALS
OXYGEN SATURATION: 98 % | HEIGHT: 64 IN | RESPIRATION RATE: 18 BRPM | DIASTOLIC BLOOD PRESSURE: 74 MMHG | SYSTOLIC BLOOD PRESSURE: 94 MMHG | HEART RATE: 62 BPM | BODY MASS INDEX: 50.02 KG/M2 | TEMPERATURE: 98.6 F | WEIGHT: 293 LBS

## 2025-03-14 DIAGNOSIS — K80.20 SYMPTOMATIC CHOLELITHIASIS: Primary | ICD-10-CM

## 2025-03-14 LAB
ALBUMIN SERPL-MCNC: 4.3 G/DL (ref 3.5–5.2)
ALBUMIN/GLOB SERPL: 1.4 G/DL
ALP SERPL-CCNC: 68 U/L (ref 39–117)
ALT SERPL W P-5'-P-CCNC: 29 U/L (ref 1–33)
ANION GAP SERPL CALCULATED.3IONS-SCNC: 14.5 MMOL/L (ref 5–15)
AST SERPL-CCNC: 22 U/L (ref 1–32)
B-HCG UR QL: NEGATIVE
BACTERIA UR QL AUTO: ABNORMAL /HPF
BASOPHILS # BLD AUTO: 0.01 10*3/MM3 (ref 0–0.2)
BASOPHILS NFR BLD AUTO: 0.1 % (ref 0–1.5)
BILIRUB SERPL-MCNC: 0.2 MG/DL (ref 0–1.2)
BILIRUB UR QL STRIP: NEGATIVE
BUN SERPL-MCNC: 8 MG/DL (ref 6–20)
BUN/CREAT SERPL: 12.1 (ref 7–25)
CALCIUM SPEC-SCNC: 9.5 MG/DL (ref 8.6–10.5)
CHLORIDE SERPL-SCNC: 103 MMOL/L (ref 98–107)
CLARITY UR: CLEAR
CO2 SERPL-SCNC: 21.5 MMOL/L (ref 22–29)
COLOR UR: YELLOW
CREAT SERPL-MCNC: 0.66 MG/DL (ref 0.57–1)
DEPRECATED RDW RBC AUTO: 41.6 FL (ref 37–54)
EGFRCR SERPLBLD CKD-EPI 2021: 120.4 ML/MIN/1.73
EOSINOPHIL # BLD AUTO: 0.04 10*3/MM3 (ref 0–0.4)
EOSINOPHIL NFR BLD AUTO: 0.5 % (ref 0.3–6.2)
ERYTHROCYTE [DISTWIDTH] IN BLOOD BY AUTOMATED COUNT: 13.5 % (ref 12.3–15.4)
GEN 5 1HR TROPONIN T REFLEX: 11 NG/L
GLOBULIN UR ELPH-MCNC: 3.1 GM/DL
GLUCOSE SERPL-MCNC: 91 MG/DL (ref 65–99)
GLUCOSE UR STRIP-MCNC: NEGATIVE MG/DL
HCT VFR BLD AUTO: 37.8 % (ref 34–46.6)
HGB BLD-MCNC: 12.6 G/DL (ref 12–15.9)
HGB UR QL STRIP.AUTO: NEGATIVE
HOLD SPECIMEN: NORMAL
HYALINE CASTS UR QL AUTO: ABNORMAL /LPF
IMM GRANULOCYTES # BLD AUTO: 0.02 10*3/MM3 (ref 0–0.05)
IMM GRANULOCYTES NFR BLD AUTO: 0.3 % (ref 0–0.5)
KETONES UR QL STRIP: NEGATIVE
LEUKOCYTE ESTERASE UR QL STRIP.AUTO: NEGATIVE
LIPASE SERPL-CCNC: 22 U/L (ref 13–60)
LYMPHOCYTES # BLD AUTO: 2.35 10*3/MM3 (ref 0.7–3.1)
LYMPHOCYTES NFR BLD AUTO: 30.6 % (ref 19.6–45.3)
MCH RBC QN AUTO: 27.5 PG (ref 26.6–33)
MCHC RBC AUTO-ENTMCNC: 33.3 G/DL (ref 31.5–35.7)
MCV RBC AUTO: 82.4 FL (ref 79–97)
MONOCYTES # BLD AUTO: 0.62 10*3/MM3 (ref 0.1–0.9)
MONOCYTES NFR BLD AUTO: 8.1 % (ref 5–12)
MUCOUS THREADS URNS QL MICRO: ABNORMAL /HPF
NEUTROPHILS NFR BLD AUTO: 4.64 10*3/MM3 (ref 1.7–7)
NEUTROPHILS NFR BLD AUTO: 60.4 % (ref 42.7–76)
NITRITE UR QL STRIP: NEGATIVE
PH UR STRIP.AUTO: 6 [PH] (ref 5–8)
PLATELET # BLD AUTO: 239 10*3/MM3 (ref 140–450)
PMV BLD AUTO: 10.3 FL (ref 6–12)
POTASSIUM SERPL-SCNC: 4.3 MMOL/L (ref 3.5–5.2)
PROT SERPL-MCNC: 7.4 G/DL (ref 6–8.5)
PROT UR QL STRIP: ABNORMAL
RBC # BLD AUTO: 4.59 10*6/MM3 (ref 3.77–5.28)
RBC # UR STRIP: ABNORMAL /HPF
REF LAB TEST METHOD: ABNORMAL
SODIUM SERPL-SCNC: 139 MMOL/L (ref 136–145)
SP GR UR STRIP: >=1.03 (ref 1–1.03)
SQUAMOUS #/AREA URNS HPF: ABNORMAL /HPF
TRANS CELLS #/AREA URNS HPF: ABNORMAL /HPF
TROPONIN T NUMERIC DELTA: NORMAL
TROPONIN T SERPL HS-MCNC: <6 NG/L
UROBILINOGEN UR QL STRIP: ABNORMAL
WBC # UR STRIP: ABNORMAL /HPF
WBC NRBC COR # BLD AUTO: 7.68 10*3/MM3 (ref 3.4–10.8)
WHOLE BLOOD HOLD COAG: NORMAL
WHOLE BLOOD HOLD SPECIMEN: NORMAL

## 2025-03-14 PROCEDURE — 36415 COLL VENOUS BLD VENIPUNCTURE: CPT

## 2025-03-14 PROCEDURE — 25510000001 IOPAMIDOL 61 % SOLUTION: Performed by: STUDENT IN AN ORGANIZED HEALTH CARE EDUCATION/TRAINING PROGRAM

## 2025-03-14 PROCEDURE — 80053 COMPREHEN METABOLIC PANEL: CPT | Performed by: STUDENT IN AN ORGANIZED HEALTH CARE EDUCATION/TRAINING PROGRAM

## 2025-03-14 PROCEDURE — 25010000002 METOCLOPRAMIDE PER 10 MG: Performed by: PHYSICIAN ASSISTANT

## 2025-03-14 PROCEDURE — 85025 COMPLETE CBC W/AUTO DIFF WBC: CPT | Performed by: STUDENT IN AN ORGANIZED HEALTH CARE EDUCATION/TRAINING PROGRAM

## 2025-03-14 PROCEDURE — 96361 HYDRATE IV INFUSION ADD-ON: CPT

## 2025-03-14 PROCEDURE — 25010000002 MORPHINE PER 10 MG: Performed by: STUDENT IN AN ORGANIZED HEALTH CARE EDUCATION/TRAINING PROGRAM

## 2025-03-14 PROCEDURE — 25010000002 KETOROLAC TROMETHAMINE PER 15 MG: Performed by: PHYSICIAN ASSISTANT

## 2025-03-14 PROCEDURE — 81001 URINALYSIS AUTO W/SCOPE: CPT | Performed by: STUDENT IN AN ORGANIZED HEALTH CARE EDUCATION/TRAINING PROGRAM

## 2025-03-14 PROCEDURE — 83690 ASSAY OF LIPASE: CPT | Performed by: STUDENT IN AN ORGANIZED HEALTH CARE EDUCATION/TRAINING PROGRAM

## 2025-03-14 PROCEDURE — 96374 THER/PROPH/DIAG INJ IV PUSH: CPT

## 2025-03-14 PROCEDURE — 96375 TX/PRO/DX INJ NEW DRUG ADDON: CPT

## 2025-03-14 PROCEDURE — 99285 EMERGENCY DEPT VISIT HI MDM: CPT

## 2025-03-14 PROCEDURE — 84484 ASSAY OF TROPONIN QUANT: CPT | Performed by: STUDENT IN AN ORGANIZED HEALTH CARE EDUCATION/TRAINING PROGRAM

## 2025-03-14 PROCEDURE — 25010000002 DIPHENHYDRAMINE PER 50 MG: Performed by: PHYSICIAN ASSISTANT

## 2025-03-14 PROCEDURE — 81025 URINE PREGNANCY TEST: CPT | Performed by: PHYSICIAN ASSISTANT

## 2025-03-14 PROCEDURE — 74177 CT ABD & PELVIS W/CONTRAST: CPT

## 2025-03-14 PROCEDURE — 93005 ELECTROCARDIOGRAM TRACING: CPT | Performed by: STUDENT IN AN ORGANIZED HEALTH CARE EDUCATION/TRAINING PROGRAM

## 2025-03-14 PROCEDURE — 25810000003 SODIUM CHLORIDE 0.9 % SOLUTION: Performed by: PHYSICIAN ASSISTANT

## 2025-03-14 PROCEDURE — 25010000002 ONDANSETRON PER 1 MG: Performed by: PHYSICIAN ASSISTANT

## 2025-03-14 RX ORDER — SODIUM CHLORIDE 0.9 % (FLUSH) 0.9 %
10 SYRINGE (ML) INJECTION AS NEEDED
Status: DISCONTINUED | OUTPATIENT
Start: 2025-03-14 | End: 2025-03-15 | Stop reason: HOSPADM

## 2025-03-14 RX ORDER — OXYCODONE AND ACETAMINOPHEN 10; 325 MG/1; MG/1
1 TABLET ORAL ONCE
Refills: 0 | Status: COMPLETED | OUTPATIENT
Start: 2025-03-14 | End: 2025-03-14

## 2025-03-14 RX ORDER — OXYCODONE AND ACETAMINOPHEN 5; 325 MG/1; MG/1
1 TABLET ORAL EVERY 4 HOURS PRN
Qty: 12 TABLET | Refills: 0 | Status: SHIPPED | OUTPATIENT
Start: 2025-03-14

## 2025-03-14 RX ORDER — FAMOTIDINE 10 MG/ML
20 INJECTION, SOLUTION INTRAVENOUS ONCE
Status: COMPLETED | OUTPATIENT
Start: 2025-03-14 | End: 2025-03-14

## 2025-03-14 RX ORDER — DIPHENHYDRAMINE HYDROCHLORIDE 50 MG/ML
25 INJECTION INTRAMUSCULAR; INTRAVENOUS ONCE
Status: COMPLETED | OUTPATIENT
Start: 2025-03-14 | End: 2025-03-14

## 2025-03-14 RX ORDER — IOPAMIDOL 612 MG/ML
100 INJECTION, SOLUTION INTRAVASCULAR
Status: COMPLETED | OUTPATIENT
Start: 2025-03-14 | End: 2025-03-14

## 2025-03-14 RX ORDER — ONDANSETRON 2 MG/ML
4 INJECTION INTRAMUSCULAR; INTRAVENOUS ONCE
Status: COMPLETED | OUTPATIENT
Start: 2025-03-14 | End: 2025-03-14

## 2025-03-14 RX ORDER — KETOROLAC TROMETHAMINE 30 MG/ML
30 INJECTION, SOLUTION INTRAMUSCULAR; INTRAVENOUS ONCE
Status: COMPLETED | OUTPATIENT
Start: 2025-03-14 | End: 2025-03-14

## 2025-03-14 RX ORDER — METOCLOPRAMIDE HYDROCHLORIDE 5 MG/ML
10 INJECTION INTRAMUSCULAR; INTRAVENOUS ONCE
Status: COMPLETED | OUTPATIENT
Start: 2025-03-14 | End: 2025-03-14

## 2025-03-14 RX ORDER — ONDANSETRON 4 MG/1
4 TABLET, FILM COATED ORAL EVERY 6 HOURS
Qty: 12 TABLET | Refills: 0 | Status: SHIPPED | OUTPATIENT
Start: 2025-03-14

## 2025-03-14 RX ADMIN — DIPHENHYDRAMINE HYDROCHLORIDE 25 MG: 50 INJECTION INTRAMUSCULAR; INTRAVENOUS at 20:54

## 2025-03-14 RX ADMIN — KETOROLAC TROMETHAMINE 30 MG: 30 INJECTION, SOLUTION INTRAMUSCULAR; INTRAVENOUS at 19:37

## 2025-03-14 RX ADMIN — METOCLOPRAMIDE 10 MG: 5 INJECTION, SOLUTION INTRAMUSCULAR; INTRAVENOUS at 20:54

## 2025-03-14 RX ADMIN — SODIUM CHLORIDE 1000 ML: 900 INJECTION, SOLUTION INTRAVENOUS at 17:51

## 2025-03-14 RX ADMIN — OXYCODONE HYDROCHLORIDE AND ACETAMINOPHEN 1 TABLET: 10; 325 TABLET ORAL at 22:18

## 2025-03-14 RX ADMIN — FAMOTIDINE 20 MG: 10 INJECTION, SOLUTION INTRAVENOUS at 20:54

## 2025-03-14 RX ADMIN — ONDANSETRON 4 MG: 2 INJECTION INTRAMUSCULAR; INTRAVENOUS at 18:27

## 2025-03-14 RX ADMIN — IOPAMIDOL 95 ML: 612 INJECTION, SOLUTION INTRAVENOUS at 18:40

## 2025-03-14 RX ADMIN — MORPHINE SULFATE 4 MG: 4 INJECTION, SOLUTION INTRAMUSCULAR; INTRAVENOUS at 17:52

## 2025-03-14 NOTE — TELEPHONE ENCOUNTER
Patient called and states that she has had some abdominal pain that is located a couple inches above her belly button, and pain is radiating into her left shoulder blade, Ongoing for a couple of days, last night she vomited bile and is very nauseous. Loose stools and did notice some bright red bleeding 4-5 days ago but not diarrhea. No changes to diet, nobody else at home is sick, was able to eat some yesterday and is staying hydrated.

## 2025-03-15 NOTE — [facility] PROVIDER NOTE
Subjective  History of Present Illness:    Patient is a 29-year-old female presenting to the emergency department complaints of abdominal pain.  She reports pain in her epigastrium/right upper quadrant for the past 4 days.  She states she has had previous episodes of pain, though this is to be more persistent and worsening.  She complains of nausea and worsening pain after meals.  She also complains of occasional episodes of vomiting and diarrhea.  She denies fever, hematemesis, bloody stools.  She states she has known gallstones--she was reportedly supposed to have a cholecystectomy in conjunction with bariatric surgery but her bariatric surgery had not been approved yet    Nurses Notes reviewed and agree, including vitals, allergies, social history and prior medical history.     REVIEW OF SYSTEMS: All systems reviewed and not pertinent unless noted.  Review of Systems   Gastrointestinal:  Positive for abdominal pain.   All other systems reviewed and are negative.      Past Medical History:   Diagnosis Date    ADHD (attention deficit hyperactivity disorder)     Arthritis     Asthma     COVID 02/15/2022    Dyspepsia     Dysphagia     w/ meds (must crush)    Dysthymic disorder 05/03/2021    Formatting of this note might be different from the original.  Saw counselors and took medications as a teenager. Was in a mental hospital at one point. Unsure what the diagnoses were. States all medications (lexapro, paxil, prozac) made her feel like a zombie.  In early 2020, tried to take citalopram liquid for 1 week, had side effects (felt drugged up). Stopped medication. Feels like anxiety com    Fatigue     Gallstones     CT 1/2023    GERD (gastroesophageal reflux disease)     on PPI, denies prior eval    Gestational hypertension     Hypothyroidism     Kidney agenesis 04/19/2021    Formatting of this note might be different from the original.  Went to ER in April 2021 for pain, had CT scan which showed lack of a kidney.     "Migraine 2019    Topamax w/ prn Tylenol + caffeine    Postoperative nausea     \"extreme nuasea\" after CSY    Preeclampsia 2019    PTSD (post-traumatic stress disorder) 2020    Formatting of this note might be different from the original.  Prior panic attacks.  Went on trip last month and had some anxiety over it.  Worried about a possible COVID exposure but, did not come down with it.   Feels like her anxiety is getting worse.  She is not working, she's at home all day with her daughter and the dog.  She feels like her anxiety is turning into rage, like she needs to yel    Sleep apnea     CPAP nightly       Allergies:    Cetirizine      Past Surgical History:   Procedure Laterality Date    COLONOSCOPY N/A 2023    Procedure: COLONOSCOPY;  Surgeon: Eder Fragoso MD;  Location:  [facility];  Service: Gastroenterology;  Laterality: N/A;    ENDOSCOPY N/A 2024    Procedure: ESOPHAGOGASTRODUODENOSCOPY WITH BIOPSY;  Surgeon: Jenelle Combs MD;  Location:  [facility];  Service: General;  Laterality: N/A;    WISDOM TOOTH EXTRACTION N/A          Social History     Socioeconomic History    Marital status: Single    Number of children: 1    Highest education level: High school graduate   Tobacco Use    Smoking status: Former     Current packs/day: 0.00     Average packs/day: 0.5 packs/day for 6.0 years (3.0 ttl pk-yrs)     Types: Cigarettes     Start date: 2008     Quit date: 2014     Years since quittin.2     Passive exposure: Past    Smokeless tobacco: Never   Vaping Use    Vaping status: Never Used   Substance and Sexual Activity    Alcohol use: Not Currently     Alcohol/week: 1.0 - 3.0 standard drink of alcohol     Types: 1 - 3 Glasses of wine per week    Drug use: Not Currently     Types: Marijuana    Sexual activity: Yes     Partners: Male     Birth control/protection: None         Family History   Problem Relation Age of Onset    Anxiety disorder " "Mother     Depression Mother     Hypertension Mother     Diabetes Mother     Diverticulitis Mother     Pancreatitis Mother     Cholelithiasis Mother     Asthma Mother     Anxiety disorder Maternal Grandmother     Depression Maternal Grandmother     Obesity Maternal Grandmother     Diabetes Maternal Grandmother     Hypertension Maternal Grandmother     Heart disease Maternal Grandmother 70        cause of death    Sleep apnea Maternal Grandmother     Kidney failure Maternal Grandmother     Depression Maternal Aunt     Anxiety disorder Maternal Aunt        Objective  Physical Exam:  BP 94/74   Pulse 62   Temp 98.6 °F (37 °C) (Oral)   Resp 18   Ht 162.6 cm (64\")   Wt (!) 156 kg (343 lb)   LMP 02/03/2025   SpO2 98%   BMI 58.88 kg/m²      Physical Exam  Vitals and nursing note reviewed.   Constitutional:       Appearance: She is obese.   HENT:      Head: Normocephalic and atraumatic.   Eyes:      Extraocular Movements: Extraocular movements intact.      Pupils: Pupils are equal, round, and reactive to light.   Cardiovascular:      Rate and Rhythm: Normal rate and regular rhythm.   Pulmonary:      Effort: Pulmonary effort is normal.   Abdominal:      Tenderness: There is abdominal tenderness in the right upper quadrant and epigastric area. There is guarding.   Skin:     General: Skin is warm and dry.   Neurological:      General: No focal deficit present.      Mental Status: She is alert and oriented to person, place, and time.   Psychiatric:         Mood and Affect: Mood normal.         Behavior: Behavior normal.         Procedures    ED Course:         Lab Results (last 24 hours)       Procedure Component Value Units Date/Time    Urinalysis With Microscopic If Indicated (No Culture) - Urine, Clean Catch [[id number]]  (Abnormal) Collected: 03/14/25 1720    Specimen: Urine, Clean Catch Updated: 03/14/25 1728     Color, UA Yellow     Appearance, UA Clear     pH, UA 6.0     Specific Gravity, UA >=1.030     Glucose, " UA Negative     Ketones, UA Negative     Bilirubin, UA Negative     Blood, UA Negative     Protein, UA 30 mg/dL (1+)     Leuk Esterase, UA Negative     Nitrite, UA Negative     Urobilinogen, UA 0.2 E.U./dL    Urinalysis, Microscopic Only - Urine, Clean Catch [[id number]]  (Abnormal) Collected: 03/14/25 1720    Specimen: Urine, Clean Catch Updated: 03/14/25 1741     RBC, UA 0-2 /HPF      WBC, UA 0-2 /HPF      Bacteria, UA Trace /HPF      Squamous Epithelial Cells, UA 3-6 /HPF      Transitional Epithelial Cells, UA 0-2 /HPF      Hyaline Casts, UA None Seen /LPF      Mucus, UA Trace /HPF      Methodology Manual Light Microscopy    Pregnancy, Urine - Urine, Clean Catch [[id number]]  (Normal) Collected: 03/14/25 1720    Specimen: Urine, Clean Catch Updated: 03/14/25 1749     HCG, Urine QL Negative    CBC & Differential [[id number]]  (Normal) Collected: 03/14/25 1748    Specimen: Blood Updated: 03/14/25 1755    Narrative:      The following orders were created for panel order CBC & Differential.  Procedure                               Abnormality         Status                     ---------                               -----------         ------                     CBC Auto Differential[[id number]]        Normal              Final result                 Please view results for these tests on the individual orders.    Comprehensive Metabolic Panel [[id number]]  (Abnormal) Collected: 03/14/25 1748    Specimen: Blood Updated: 03/14/25 1821     Glucose 91 mg/dL      BUN 8 mg/dL      Creatinine 0.66 mg/dL      Sodium 139 mmol/L      Potassium 4.3 mmol/L      Chloride 103 mmol/L      CO2 21.5 mmol/L      Calcium 9.5 mg/dL      Total Protein 7.4 g/dL      Albumin 4.3 g/dL      ALT (SGPT) 29 U/L      AST (SGOT) 22 U/L      Alkaline Phosphatase 68 U/L      Total Bilirubin 0.2 mg/dL      Globulin 3.1 gm/dL      A/G Ratio 1.4 g/dL      BUN/Creatinine Ratio 12.1     Anion Gap 14.5 mmol/L      eGFR 120.4 mL/min/1.73     Narrative:       GFR Categories in Chronic Kidney Disease (CKD)      GFR Category          GFR (mL/min/1.73)    Interpretation  G1                     90 or greater         Normal or high (1)  G2                      60-89                Mild decrease (1)  G3a                   45-59                Mild to moderate decrease  G3b                   30-44                Moderate to severe decrease  G4                    15-29                Severe decrease  G5                    14 or less           Kidney failure          (1)In the absence of evidence of kidney disease, neither GFR category G1 or G2 fulfill the criteria for CKD.    eGFR calculation 2021 CKD-EPI creatinine equation, which does not include race as a factor    Lipase [[id number]]  (Normal) Collected: 03/14/25 1748    Specimen: Blood Updated: 03/14/25 1820     Lipase 22 U/L     High Sensitivity Troponin T [[id number]]  (Normal) Collected: 03/14/25 1748    Specimen: Blood Updated: 03/14/25 1817     HS Troponin T <6 ng/L     CBC Auto Differential [[id number]]  (Normal) Collected: 03/14/25 1748    Specimen: Blood Updated: 03/14/25 1755     WBC 7.68 10*3/mm3      RBC 4.59 10*6/mm3      Hemoglobin 12.6 g/dL      Hematocrit 37.8 %      MCV 82.4 fL      MCH 27.5 pg      MCHC 33.3 g/dL      RDW 13.5 %      RDW-SD 41.6 fl      MPV 10.3 fL      Platelets 239 10*3/mm3      Neutrophil % 60.4 %      Lymphocyte % 30.6 %      Monocyte % 8.1 %      Eosinophil % 0.5 %      Basophil % 0.1 %      Immature Grans % 0.3 %      Neutrophils, Absolute 4.64 10*3/mm3      Lymphocytes, Absolute 2.35 10*3/mm3      Monocytes, Absolute 0.62 10*3/mm3      Eosinophils, Absolute 0.04 10*3/mm3      Basophils, Absolute 0.01 10*3/mm3      Immature Grans, Absolute 0.02 10*3/mm3     High Sensitivity Troponin T 1Hr [[id number]] Collected: 03/14/25 1913    Specimen: Blood Updated: 03/14/25 1945     HS Troponin T 11 ng/L      Troponin T Numeric Delta --     Comment: Unable to calculate.       Narrative:      High  Sensitive Troponin T Reference Range:  <14.0 ng/L- Negative Female for AMI  <22.0 ng/L- Negative Male for AMI  >=14 - Abnormal Female indicating possible myocardial injury.  >=22 - Abnormal Male indicating possible myocardial injury.   Clinicians would have to utilize clinical acumen, EKG, Troponin, and serial changes to determine if it is an Acute Myocardial Infarction or myocardial injury due to an underlying chronic condition.                  CT Abdomen Pelvis With Contrast  Result Date: 3/14/2025  CT ABDOMEN PELVIS W CONTRAST Date of Exam: 3/14/2025 6:30 PM EDT Indication: RUQ pain. Comparison: CT abdomen pelvis February 14, 2015 Technique: Axial CT images were obtained of the abdomen and pelvis following the uneventful intravenous administration of 95 cc of Isovue-300 . Reconstructed coronal and sagittal images were also obtained. Automated exposure control and iterative construction methods were used. Findings: Upper slices through the lower chest reveal a granuloma in the right lower lobe as well with calcification in the posterior mediastinal area compatible with prior granulomatous exposure. There is hepatic steatosis. There are gallstones within the gallbladder without definitive change of acute cholecystitis. The spleen is grossly unremarkable. Pancreas does not appear unusual. The adrenal glands do not appear abnormal. There is absence of the left kidney. The right kidney reveals no obstructive changes. There is a nonobstructing calculus measuring 1.5 cm within the upper pole the right kidney. The uterus lies to the right of midline. There is no acute bowel abnormality. Early atherosclerotic vascular calcification is noted. Visualized osseous structures do not appear unusual for the given patient.     Impression: Impression: 1.Hepatic steatosis. 2.Cholelithiasis. 3.Absent left kidney. 4.Nonobstructing right renal calculus. 5.Early atherosclerotic vascular calcification. 6.Evidence for prior  granulomatous exposure. Electronically Signed: Sim Driver MD  3/14/2025 7:04 PM EDT  Workstation ID: [id number]         [facility]     Amount and/or Complexity of Data Reviewed  Clinical lab tests: reviewed  Tests in the radiology section of CPT®: reviewed  Tests in the medicine section of CPT®: reviewed          DDX: includes but is not limited to: Cholelithiasis, cholecystitis, gastritis    Patient arrives POV with vitals interpreted by myself.     Pertinent features from physical exam: Abdomen tender to palpation in the right upper quadrant and epigastrium.    Diagnostic information from other sources: CT scan shows innumerable gallstones    Interventions / Re-evaluation: Patient's pain has improved, as well as her nausea.  She is tolerating p.o. fluids    Medications   sodium chloride 0.9 % flush 10 mL (has no administration in time range)   morphine injection 4 mg (4 mg Intravenous Given 3/14/25 1752)   sodium chloride 0.9 % bolus 1,000 mL (0 mL Intravenous Stopped 3/14/25 1944)   ondansetron (ZOFRAN) injection 4 mg (4 mg Intravenous Given 3/14/25 1827)   iopamidol (ISOVUE-300) 61 % injection 100 mL (95 mL Intravenous Given 3/14/25 1840)   ketorolac (TORADOL) injection 30 mg (30 mg Intravenous Given 3/14/25 1937)   metoclopramide (REGLAN) injection 10 mg (10 mg Intravenous Given 3/14/25 2054)   diphenhydrAMINE (BENADRYL) injection 25 mg (25 mg Intravenous Given 3/14/25 2054)   famotidine (PEPCID) injection 20 mg (20 mg Intravenous Given 3/14/25 2054)   oxyCODONE-acetaminophen (PERCOCET)  MG per tablet 1 tablet (1 tablet Oral Given 3/14/25 2218)       Results/clinical rationale were discussed with patient    Consultations/Discussion of results with other physicians: Dr. Flores--outpatient follow-up    -----  ED Disposition       ED Disposition   Discharge    Condition   Stable    Comment   --             Final diagnoses:   Symptomatic cholelithiasis      Your Follow-Up Providers       Schedule an appointment as  soon as possible for a visit  with Chun Flores MD.    Specialty: General Surgery  Follow up details: recheck of symptoms  [address]  [place] [address]  [phone]               Chun Flores MD .    Specialty: General Surgery  [address]  [place] [address]  [phone]                       Contact information for after-discharge care    Follow-up information has not been specified.                    Your medication list        START taking these medications        Instructions Last Dose Given Next Dose Due   ondansetron 4 MG tablet  Commonly known as: ZOFRAN      Take 1 tablet by mouth Every 6 (Six) Hours.       oxyCODONE-acetaminophen 5-325 MG per tablet  Commonly known as: PERCOCET      Take 1 tablet by mouth Every 4 (Four) Hours As Needed for Moderate Pain for up to 12 doses.              CONTINUE taking these medications        Instructions Last Dose Given Next Dose Due   albuterol sulfate  (90 Base) MCG/ACT inhaler  Commonly known as: PROVENTIL HFA;VENTOLIN HFA;PROAIR HFA      Inhale 2 puffs Every 4 (Four) Hours As Needed for Wheezing.       clonazePAM 0.5 MG tablet  Commonly known as: KlonoPIN      Take 1 tablet by mouth 2 (Two) Times a Day As Needed.       cyclobenzaprine 5 MG tablet  Commonly known as: FLEXERIL      Take 1 tablet by mouth 3 (Three) Times a Day As Needed for Muscle Spasms.       fluticasone 50 MCG/ACT nasal spray  Commonly known as: Flonase      2 sprays into the nostril(s) as directed by provider Daily.       levothyroxine 112 MCG tablet  Commonly known as: SYNTHROID, LEVOTHROID      TAKE 1 TABLET BY MOUTH ONCE DAILY       omeprazole 40 MG capsule  Commonly known as: priLOSEC      TAKE 1 CAPSULE BY MOUTH ONCE DAILY       propranolol 20 MG tablet  Commonly known as: INDERAL      Take 1 tablet by mouth 2 (Two) Times a Day.       QUEtiapine 25 MG tablet  Commonly known as: SEROquel      TAKE 1/2 TO 1 TABLET BY MOUTH TWICE  DAILY AS NEEDED FOR ANXIETY/PANIC       rizatriptan MLT 5 MG disintegrating tablet  Commonly known as: MAXALT-MLT      Place 1 tablet on the tongue 1 (One) Time As Needed for Migraine. May repeat in 2 hours if needed                 Where to Get Your Medications        These medications were sent to [facility]/pharmacy #[id number] - [place], [place] - [address] - [phone]  - [phone] FX  [address], [facility] [address]      Hours: 24-hours Phone: [phone]   ondansetron 4 MG tablet  oxyCODONE-acetaminophen 5-325 MG per tablet

## 2025-03-18 LAB
QT INTERVAL: 370 MS
QTC INTERVAL: 399 MS

## 2025-04-16 NOTE — PROGRESS NOTES
Sleep Clinic Follow Up Note    Chief Complaint  Sleeping Problem (Follow up)    Subjective     History of Present Illness (from previous encounter on 11/25/2024):  Lilly Segovia is a 29 y.o. female who returns for follow-up and compliance of PAP therapy.  The Pap report has been reviewed.  Overall usage is at 97% with compliance of 87%.  The patient averages 6 hours and 49 minutes of therapy.  Sleep apnea is well-controlled with an AHI of 0.5/H.  The patient has a history of mild obstructive sleep apnea with an initial AHI of 11.4/H. I will refill the patient's supplies, and I have asked them to return for follow-up and compliance in 1 year or sooner should they have further questions or concerns. (End copied text).    Interval History:  Lilly Segovia is a 29 y.o. female returns for follow up and compliance of PAP therapy. The patient was last seen on 11/25/2024 by me. She comes to have surgical clearance. Overall the patient feels good with regard to therapy. The device appears to be working appropriately. On average the patient sleeps 5-8 hours per night. The patient wakes 1-2 times per night.     The patient reports the following changes to their medical and medication history since they were last seen:  To have gallbladder   And will have bariatric surgery    Further details are as follows:      [place] Scale is (out of 24): Total score: 3     Weight:  Current Weight: 325 lb        The patient's relevant past medical, surgical, family, and social history reviewed and updated in Epic as appropriate.    PMH:    Past Medical History:   Diagnosis Date    ADHD (attention deficit hyperactivity disorder)     Arthritis     Asthma     COVID 02/15/2022    Dyspepsia     Dysphagia     w/ meds (must crush)    Dysthymic disorder 05/03/2021    Formatting of this note might be different from the original.  Saw counselors and took medications as a teenager. Was in a mental hospital at one point. Unsure what the diagnoses  "were. States all medications (lexapro, paxil, prozac) made her feel like a zombie.  In early , tried to take citalopram liquid for 1 week, had side effects (felt drugged up). Stopped medication. Feels like anxiety com    Fatigue     Gallstones     CT 2023    GERD (gastroesophageal reflux disease)     on PPI, denies prior eval    Gestational hypertension     Hypothyroidism     Kidney agenesis 2021    Formatting of this note might be different from the original.  Went to ER in 2021 for pain, had CT scan which showed lack of a kidney.    Migraine 2019    Topamax w/ prn Tylenol + caffeine    Postoperative nausea     \"extreme nuasea\" after CSY    Preeclampsia     PTSD (post-traumatic stress disorder) 2020    Formatting of this note might be different from the original.  Prior panic attacks.  Went on trip last month and had some anxiety over it.  Worried about a possible COVID exposure but, did not come down with it.   Feels like her anxiety is getting worse.  She is not working, she's at home all day with her daughter and the dog.  She feels like her anxiety is turning into rage, like she needs to yel    Sleep apnea     CPAP nightly     Past Surgical History:   Procedure Laterality Date    COLONOSCOPY N/A 2023    Procedure: COLONOSCOPY;  Surgeon: Eder Fragoso MD;  Location:  [facility];  Service: Gastroenterology;  Laterality: N/A;    ENDOSCOPY N/A 2024    Procedure: ESOPHAGOGASTRODUODENOSCOPY WITH BIOPSY;  Surgeon: Jenelle Combs MD;  Location:  [facility];  Service: General;  Laterality: N/A;    WISDOM TOOTH EXTRACTION N/A      OB History          2    Para   1    Term           1    AB   1    Living   1         SAB        IAB        Ectopic        Molar        Multiple        Live Births   1              Allergies   Allergen Reactions    Cetirizine Hives       MEDS:  Prior to Admission medications    Medication Sig Start " Date End Date Taking? Authorizing Provider   albuterol sulfate  (90 Base) MCG/ACT inhaler Inhale 2 puffs Every 4 (Four) Hours As Needed for Wheezing. 11/26/24   Gypsy Vann APRN   clonazePAM (KlonoPIN) 0.5 MG tablet Take 1 tablet by mouth 2 (Two) Times a Day As Needed.    Provider, MD Anahi   cyclobenzaprine (FLEXERIL) 5 MG tablet Take 1 tablet by mouth 3 (Three) Times a Day As Needed for Muscle Spasms. 1/28/25   Dior Bach APRN   fluticasone (Flonase) 50 MCG/ACT nasal spray 2 sprays into the nostril(s) as directed by provider Daily. 3/11/22   Dior Bach APRN   levothyroxine (SYNTHROID, LEVOTHROID) 112 MCG tablet TAKE 1 TABLET BY MOUTH ONCE DAILY 3/5/25   Dior Bach APRN   omeprazole (priLOSEC) 40 MG capsule TAKE 1 CAPSULE BY MOUTH ONCE DAILY 2/19/25   Dior Bach APRN   ondansetron (ZOFRAN) 4 MG tablet Take 1 tablet by mouth Every 6 (Six) Hours. 3/14/25   Amandeep David PA-C   oxyCODONE-acetaminophen (PERCOCET) 5-325 MG per tablet Take 1 tablet by mouth Every 4 (Four) Hours As Needed for Moderate Pain for up to 12 doses. 3/14/25   Allan Mustafa DO   propranolol (INDERAL) 20 MG tablet Take 1 tablet by mouth 2 (Two) Times a Day.  Patient not taking: Reported on 1/28/2025 12/11/24   Dior Bach APRN   QUEtiapine (SEROquel) 25 MG tablet TAKE 1/2 TO 1 TABLET BY MOUTH TWICE DAILY AS NEEDED FOR ANXIETY/PANIC 10/18/24   Dior Bach APRN   rizatriptan MLT (MAXALT-MLT) 5 MG disintegrating tablet Place 1 tablet on the tongue 1 (One) Time As Needed for Migraine. May repeat in 2 hours if needed 1/28/25   Dior Bach APRN         FH:  Family History   Problem Relation Age of Onset    Anxiety disorder Mother     Depression Mother     Hypertension Mother     Diabetes Mother     Diverticulitis Mother     Pancreatitis Mother     Cholelithiasis Mother     Asthma Mother     Anxiety disorder Maternal Grandmother     Depression Maternal Grandmother      "Obesity Maternal Grandmother     Diabetes Maternal Grandmother     Hypertension Maternal Grandmother     Heart disease Maternal Grandmother 70        cause of death    Sleep apnea Maternal Grandmother     Kidney failure Maternal Grandmother     Depression Maternal Aunt     Anxiety disorder Maternal Aunt        Objective   Vital Signs:  /70 (BP Location: Left arm, Patient Position: Sitting, Cuff Size: Adult)   Pulse 65   Temp 97.3 °F (36.3 °C) (Temporal)   Ht 162.6 cm (64.02\")   Wt (!) 148 kg (325 lb 7.2 oz)   SpO2 98%   BMI 55.84 kg/m²     Patient's (Body mass index is 55.84 kg/m².) indicates that they are morbidly obese (BMI > 40 or > 35 with obesity - related health condition)          Physical Exam  Vitals reviewed.   Constitutional:       Appearance: Normal appearance.   HENT:      Head: Normocephalic and atraumatic.      Nose: Nose normal.      Mouth/Throat:      Mouth: Mucous membranes are moist.   Cardiovascular:      Rate and Rhythm: Normal rate and regular rhythm.      Heart sounds: No murmur heard.     No friction rub. No gallop.   Pulmonary:      Effort: Pulmonary effort is normal. No respiratory distress.      Breath sounds: Normal breath sounds. No wheezing or rhonchi.   Neurological:      Mental Status: She is alert and oriented to person, place, and time.   Psychiatric:         Behavior: Behavior normal.               Result Review :           PAP Report:  AHI: 0.8/h  Days of Usage: 30/30 (100%)  Number of Days Greater than 4 hours: 29/30 (97%)  Average time (days used): 8 hours 19 minutes  95th Percentile Pressure: 13.2 cmH2O  95th percentile leaks: 2.5 L/min  Settings: Auto CPAP-8/18 cm H2O, EPR full-time, EPR level 1, response standard.       Assessment and Plan  Lilly Segovia is a 29 y.o. female who returns for follow-up and compliance of PAP therapy.  The Pap report has been reviewed.  Overall usage is at 100% with compliance at 97%.  The patient averages 8 hours and 19 minutes of " therapy.  Sleep apnea is well-controlled with an AHI of 0.8/h. I will refill the patient's supplies, and I have asked them to return for follow-up and compliance in 1 year or sooner should they have further questions or concerns.    Patient comes for surgical clearance.  There should be no contraindication to surgery from a sleep apnea perspective as long as the patient is using her PAP device.  PAP therapy is treating sleep apnea appropriately.    Diagnoses and all orders for this visit:    1. AISHA (obstructive sleep apnea) (Primary)  -     PAP Therapy    2. Morbid (severe) obesity due to excess calories         The patient continues to use and benefit from PAP therapy.    1. The patient was counseled regarding multimodal approach with healthy nutrition, healthy sleep, regular physical activity, social activities, counseling, and medications. Encouraged to practice lateral sleep position. Avoid alcohol and sedatives close to bedtime.     2.  We will refill supplies x1 year.  Return to clinic 1 year or sooner if symptoms warrant. I have reviewed the results of my evaluation and impression and discussed my recommendations in detail with the patient.           Follow Up  Return in about 1 year (around 4/21/2026) for Annual visit.  Patient was given instructions and counseling regarding her condition or for health maintenance advice. Please see specific information pulled into the AVS if appropriate.       EDGARDO Mak, ACNP-BC  Pulmonology, Critical Care, and Sleep Medicine

## 2025-04-21 ENCOUNTER — OFFICE VISIT (OUTPATIENT)
Dept: SLEEP MEDICINE | Age: 30
End: 2025-04-21
Payer: COMMERCIAL

## 2025-04-21 VITALS
DIASTOLIC BLOOD PRESSURE: 70 MMHG | TEMPERATURE: 97.3 F | OXYGEN SATURATION: 98 % | WEIGHT: 293 LBS | HEART RATE: 65 BPM | HEIGHT: 64 IN | SYSTOLIC BLOOD PRESSURE: 130 MMHG | BODY MASS INDEX: 50.02 KG/M2

## 2025-04-21 DIAGNOSIS — E66.01 MORBID (SEVERE) OBESITY DUE TO EXCESS CALORIES: ICD-10-CM

## 2025-04-21 DIAGNOSIS — G47.33 OSA (OBSTRUCTIVE SLEEP APNEA): Primary | ICD-10-CM

## 2025-04-21 PROCEDURE — 1160F RVW MEDS BY RX/DR IN RCRD: CPT | Performed by: NURSE PRACTITIONER

## 2025-04-21 PROCEDURE — 1159F MED LIST DOCD IN RCRD: CPT | Performed by: NURSE PRACTITIONER

## 2025-04-21 PROCEDURE — 99213 OFFICE O/P EST LOW 20 MIN: CPT | Performed by: NURSE PRACTITIONER

## 2025-04-21 RX ORDER — BUPROPION HYDROCHLORIDE 200 MG/1
200 TABLET, EXTENDED RELEASE ORAL DAILY
COMMUNITY

## 2025-04-28 ENCOUNTER — OFFICE VISIT (OUTPATIENT)
Dept: INTERNAL MEDICINE | Facility: CLINIC | Age: 30
End: 2025-04-28
Payer: COMMERCIAL

## 2025-04-28 ENCOUNTER — LAB (OUTPATIENT)
Dept: INTERNAL MEDICINE | Facility: CLINIC | Age: 30
End: 2025-04-28
Payer: COMMERCIAL

## 2025-04-28 VITALS
HEIGHT: 64 IN | OXYGEN SATURATION: 98 % | WEIGHT: 293 LBS | SYSTOLIC BLOOD PRESSURE: 126 MMHG | HEART RATE: 68 BPM | TEMPERATURE: 97.7 F | DIASTOLIC BLOOD PRESSURE: 78 MMHG | RESPIRATION RATE: 16 BRPM | BODY MASS INDEX: 50.02 KG/M2

## 2025-04-28 DIAGNOSIS — E03.9 ACQUIRED HYPOTHYROIDISM: ICD-10-CM

## 2025-04-28 DIAGNOSIS — F41.0 PANIC DISORDER (EPISODIC PAROXYSMAL ANXIETY): ICD-10-CM

## 2025-04-28 DIAGNOSIS — E03.9 ACQUIRED HYPOTHYROIDISM: Primary | ICD-10-CM

## 2025-04-28 DIAGNOSIS — F41.1 GENERALIZED ANXIETY DISORDER: ICD-10-CM

## 2025-04-28 DIAGNOSIS — K21.9 GASTROESOPHAGEAL REFLUX DISEASE, UNSPECIFIED WHETHER ESOPHAGITIS PRESENT: ICD-10-CM

## 2025-04-28 DIAGNOSIS — K80.20 GALLSTONES: ICD-10-CM

## 2025-04-28 PROCEDURE — 84443 ASSAY THYROID STIM HORMONE: CPT | Performed by: NURSE PRACTITIONER

## 2025-04-28 PROCEDURE — 1125F AMNT PAIN NOTED PAIN PRSNT: CPT | Performed by: NURSE PRACTITIONER

## 2025-04-28 PROCEDURE — 36415 COLL VENOUS BLD VENIPUNCTURE: CPT | Performed by: NURSE PRACTITIONER

## 2025-04-28 PROCEDURE — 99214 OFFICE O/P EST MOD 30 MIN: CPT | Performed by: NURSE PRACTITIONER

## 2025-04-28 NOTE — PROGRESS NOTES
Lilly Segovia presents to [facility] for     Chief Complaint  Chief Complaint   Patient presents with    Anxiety    Heartburn    Hypothyroidism    Palpitations       PCP:  Dior Bach APRN    Subjective          She has gallstones and is scheduled for cholecystectomy next week with Dr. Chun Flores.  Is working with bariatrics on getting cleared for gastric sleeve.  Reports that she needs updated thyroid labs for them.  Has hypothyroidism and currently on levothyroxine.  Denies any hypo or hyperthyroid symptoms.      Anxiety and depression-chronic.  Currently on Wellbutrin propranolol and Seroquel.  Has as needed Klonopin not having to use right now.  Reports symptoms are fairly well-controlled presently.  On propranolol for palpitations as well as anxiety.  Reports palpitations have been doing well.  Heartburn stable with omeprazole.    Doing fairly well today with no acute complaints.  Ready to get her cholecystectomy and bariatric release      Health Maintenance:   Health Maintenance   Topic Date Due    INFLUENZA VACCINE  07/01/2025    ANNUAL PHYSICAL  08/13/2025    PAP SMEAR  08/22/2025    TDAP/TD VACCINES (5 - Td or Tdap) 11/05/2029    HEPATITIS C SCREENING  Completed    COVID-19 Vaccine  Completed    Pneumococcal Vaccine 0-49  Completed             Review of Systems   Constitutional:  Negative for fatigue, fever and unexpected weight loss.   Eyes:  Negative for blurred vision, double vision and visual disturbance.   Respiratory:  Negative for cough, shortness of breath and wheezing.    Cardiovascular:  Positive for palpitations. Negative for chest pain and leg swelling.   Gastrointestinal:  Negative for abdominal pain, constipation, diarrhea, nausea and vomiting.   Genitourinary:  Negative for difficulty urinating, frequency and urgency.   Musculoskeletal:  Negative for arthralgias and myalgias.   Skin:  Negative for color change and rash.   Neurological:  Negative  for dizziness, weakness and headache.   Hematological:  Negative for adenopathy. Does not bruise/bleed easily.   Psychiatric/Behavioral:  The patient is nervous/anxious.          Allergies   Allergen Reactions    Cetirizine Hives    Nsaids Other (See Comments)     Pt has one kidney    Oxycodone Anxiety     Severe anxiety     Current Outpatient Medications on File Prior to Visit   Medication Sig Dispense Refill    albuterol sulfate  (90 Base) MCG/ACT inhaler Inhale 2 puffs Every 4 (Four) Hours As Needed for Wheezing. 18 g 0    buPROPion SR (Wellbutrin SR) 200 MG 12 hr tablet Take 1 tablet by mouth Daily. (Patient taking differently: Take 1 tablet by mouth Daily. Patient is using 150mg)      clonazePAM (KlonoPIN) 0.5 MG tablet Take 1 tablet by mouth 2 (Two) Times a Day As Needed.      cyclobenzaprine (FLEXERIL) 5 MG tablet Take 1 tablet by mouth 3 (Three) Times a Day As Needed for Muscle Spasms. 30 tablet 0    fluticasone (Flonase) 50 MCG/ACT nasal spray 2 sprays into the nostril(s) as directed by provider Daily. 16 g 11    levothyroxine (SYNTHROID, LEVOTHROID) 112 MCG tablet TAKE 1 TABLET BY MOUTH ONCE DAILY 30 tablet 1    omeprazole (priLOSEC) 40 MG capsule TAKE 1 CAPSULE BY MOUTH ONCE DAILY 90 capsule 1    ondansetron (ZOFRAN) 4 MG tablet Take 1 tablet by mouth Every 6 (Six) Hours. 12 tablet 0    propranolol (INDERAL) 20 MG tablet Take 1 tablet by mouth 2 (Two) Times a Day. (Patient taking differently: Take 0.5 tablets by mouth 2 (Two) Times a Day.) 60 tablet 3    QUEtiapine (SEROquel) 25 MG tablet TAKE 1/2 TO 1 TABLET BY MOUTH TWICE DAILY AS NEEDED FOR ANXIETY/PANIC 60 tablet 1    rizatriptan MLT (MAXALT-MLT) 5 MG disintegrating tablet Place 1 tablet on the tongue 1 (One) Time As Needed for Migraine. May repeat in 2 hours if needed 9 tablet 2    oxyCODONE-acetaminophen (PERCOCET) 5-325 MG per tablet Take 1 tablet by mouth Every 4 (Four) Hours As Needed for Moderate Pain for up to 12 doses. (Patient not  "taking: Reported on 4/28/2025) 12 tablet 0     No current facility-administered medications on file prior to visit.         The following portions of the patient's history were reviewed and updated as appropriate: allergies, current medications, past family history, past medical history, past social history, past surgical history and problem list and are available for review within electronic record    Objective     Result Review :                    Vital Signs:   /78 (BP Location: Right arm, Patient Position: Sitting, Cuff Size: Adult)   Pulse 68   Temp 97.7 °F (36.5 °C) (Infrared)   Resp 16   Ht 162.6 cm (64.02\")   Wt (!) 147 kg (324 lb 6.4 oz)   SpO2 98%   BMI 55.65 kg/m²         Physical Exam  Constitutional:       Appearance: Normal appearance. She is well-developed. She is morbidly obese. She is not ill-appearing or toxic-appearing.   HENT:      Head: Normocephalic and atraumatic.   Eyes:      General: No scleral icterus.     Conjunctiva/sclera: Conjunctivae normal.   Cardiovascular:      Rate and Rhythm: Normal rate and regular rhythm.      Heart sounds: Normal heart sounds.   Pulmonary:      Effort: Pulmonary effort is normal. No respiratory distress.      Breath sounds: Normal breath sounds.   Abdominal:      General: Bowel sounds are normal.      Palpations: Abdomen is soft.      Tenderness: There is no abdominal tenderness.   Musculoskeletal:         General: Normal range of motion.      Cervical back: Normal range of motion.      Right lower leg: No edema.      Left lower leg: No edema.   Skin:     General: Skin is warm and dry.   Neurological:      General: No focal deficit present.      Mental Status: She is alert and oriented to person, place, and time.   Psychiatric:         Attention and Perception: Attention normal.         Mood and Affect: Mood and affect normal.         Behavior: Behavior normal. Behavior is cooperative.         Thought Content: Thought content normal.         " Cognition and Memory: Cognition normal.         Judgment: Judgment normal.                 Assessment and Plan      Diagnoses and all orders for this visit:    1. Acquired hypothyroidism (Primary)  -     TSH Rfx On Abnormal To Free T4; Future    2. Gastroesophageal reflux disease, unspecified whether esophagitis present    3. Generalized anxiety disorder    4. Panic disorder (episodic paroxysmal anxiety)    5. Gallstones      Recheck thyroid labs today.  Has an appointment prior to her surgery for preadmission testing/labs.  Continue omeprazole for GERD control.  GERD precautions.  Continue current medications for anxiety and panic.  Follow-up in about 3 months or sooner if needed  Follow Up     Patient was given instructions and counseling regarding her condition or for health maintenance advice. Please see specific information pulled into the AVS if appropriate.   Any new medication's adverse effects have been discussed in detail with patient  Patient was encouraged to keep me informed of any acute changes, lack of improvement, or any new concerning symptoms.    Return in about 3 months (around 7/28/2025) for chronic condition follow up.          Dictated Utilizing Dragon Dictation   Please note that portions of this note were completed with a voice recognition program.   Part of this note may be an electronic transcription/translation of spoken language to printed text using the Dragon Dictation System.

## 2025-04-29 LAB — TSH SERPL DL<=0.05 MIU/L-ACNC: 2.61 UIU/ML (ref 0.27–4.2)

## 2025-05-01 ENCOUNTER — PRE-ADMISSION TESTING (OUTPATIENT)
Dept: PREADMISSION TESTING | Facility: HOSPITAL | Age: 30
End: 2025-05-01
Payer: COMMERCIAL

## 2025-05-01 VITALS — HEIGHT: 64 IN | WEIGHT: 293 LBS | BODY MASS INDEX: 50.02 KG/M2

## 2025-05-01 LAB
DEPRECATED RDW RBC AUTO: 41.8 FL (ref 37–54)
ERYTHROCYTE [DISTWIDTH] IN BLOOD BY AUTOMATED COUNT: 13.6 % (ref 12.3–15.4)
HCT VFR BLD AUTO: 40.9 % (ref 34–46.6)
HGB BLD-MCNC: 12.9 G/DL (ref 12–15.9)
MCH RBC QN AUTO: 26.8 PG (ref 26.6–33)
MCHC RBC AUTO-ENTMCNC: 31.5 G/DL (ref 31.5–35.7)
MCV RBC AUTO: 84.9 FL (ref 79–97)
PLATELET # BLD AUTO: 282 10*3/MM3 (ref 140–450)
PMV BLD AUTO: 10.1 FL (ref 6–12)
POTASSIUM SERPL-SCNC: 4.5 MMOL/L (ref 3.5–5.2)
RBC # BLD AUTO: 4.82 10*6/MM3 (ref 3.77–5.28)
WBC NRBC COR # BLD AUTO: 6.74 10*3/MM3 (ref 3.4–10.8)

## 2025-05-01 PROCEDURE — 85027 COMPLETE CBC AUTOMATED: CPT

## 2025-05-01 PROCEDURE — 36415 COLL VENOUS BLD VENIPUNCTURE: CPT

## 2025-05-01 PROCEDURE — 84132 ASSAY OF SERUM POTASSIUM: CPT

## 2025-05-01 NOTE — PAT
An arrival time for procedure was not provided during PAT visit. If patient had any questions or concerns about their arrival time, they were instructed to contact their surgeon/physician.  Additionally, if the patient referred to an arrival time that was acquired from their my chart account, patient was encouraged to verify that time with their surgeon/physician. Arrival times are NOT provided in Pre Admission Testing Department.    Patient denies any current skin issues.     Patient to apply Chlorhexadine wipes  to surgical area (as instructed) the night before procedure and the AM of procedure. Wipes provided.    Patient viewed general PAT education video as instructed in their preoperative information received from their surgeon.  Patient stated the general PAT education video was viewed in its entirety and survey completed.  Copies of PAT general education handouts (Incentive Spirometry, Meds to Beds Program, Patient Belongings, Pre-op skin preparation instructions, Blood Glucose testing, Visitor policy, Surgery FAQ, Code H) distributed to patient if not printed. Education related to the PAT pass and skin preparation for surgery (if applicable) completed in PAT as a reinforcement to PAT education video. Patient instructed to return PAT pass provided today as well as completed skin preparation sheet (if applicable) on the day of procedure.     Additionally if patient had not viewed video yet but intended to view it at home or in our waiting area, then referred them to the handout with QR code/link provided during PAT visit.  Encouraged patient/family to read PAT general education handouts thoroughly and notify PAT staff with any questions or concerns. Patient verbalized understanding of all information and priority content.

## 2025-05-07 ENCOUNTER — ANESTHESIA EVENT (OUTPATIENT)
Dept: PERIOP | Facility: HOSPITAL | Age: 30
End: 2025-05-07
Payer: COMMERCIAL

## 2025-05-08 ENCOUNTER — ANESTHESIA (OUTPATIENT)
Dept: PERIOP | Facility: HOSPITAL | Age: 30
End: 2025-05-08
Payer: COMMERCIAL

## 2025-05-08 ENCOUNTER — HOSPITAL ENCOUNTER (OUTPATIENT)
Facility: HOSPITAL | Age: 30
Setting detail: HOSPITAL OUTPATIENT SURGERY
Discharge: HOME | End: 2025-05-08
Attending: SURGERY | Admitting: SURGERY
Payer: COMMERCIAL

## 2025-05-08 VITALS
OXYGEN SATURATION: 94 % | DIASTOLIC BLOOD PRESSURE: 68 MMHG | SYSTOLIC BLOOD PRESSURE: 119 MMHG | BODY MASS INDEX: 50.02 KG/M2 | HEIGHT: 64 IN | RESPIRATION RATE: 16 BRPM | TEMPERATURE: 98.8 F | HEART RATE: 77 BPM | WEIGHT: 293 LBS

## 2025-05-08 DIAGNOSIS — K80.20 GALLSTONES: Primary | ICD-10-CM

## 2025-05-08 DIAGNOSIS — K81.9 CHOLECYSTITIS: ICD-10-CM

## 2025-05-08 LAB
B-HCG UR QL: NEGATIVE
EXPIRATION DATE: NORMAL
INTERNAL NEGATIVE CONTROL: NEGATIVE
INTERNAL POSITIVE CONTROL: POSITIVE
Lab: NORMAL

## 2025-05-08 PROCEDURE — 25010000002 DEXAMETHASONE PER 1 MG: Performed by: NURSE ANESTHETIST, CERTIFIED REGISTERED

## 2025-05-08 PROCEDURE — 25010000002 FENTANYL CITRATE (PF) 50 MCG/ML SOLUTION

## 2025-05-08 PROCEDURE — 25010000002 LIDOCAINE PF 1% 1 % SOLUTION: Performed by: NURSE ANESTHETIST, CERTIFIED REGISTERED

## 2025-05-08 PROCEDURE — 25010000002 MIDAZOLAM PER 1 MG: Performed by: ANESTHESIOLOGY

## 2025-05-08 PROCEDURE — 25010000002 ONDANSETRON PER 1 MG

## 2025-05-08 PROCEDURE — 25810000003 LACTATED RINGERS PER 1000 ML: Performed by: SURGERY

## 2025-05-08 PROCEDURE — 25810000003 SODIUM CHLORIDE PER 500 ML: Performed by: SURGERY

## 2025-05-08 PROCEDURE — 25010000002 HYDROMORPHONE 1 MG/ML SOLUTION

## 2025-05-08 PROCEDURE — 25010000002 FENTANYL CITRATE (PF) 100 MCG/2ML SOLUTION: Performed by: NURSE ANESTHETIST, CERTIFIED REGISTERED

## 2025-05-08 PROCEDURE — 25010000002 SUGAMMADEX 200 MG/2ML SOLUTION: Performed by: NURSE ANESTHETIST, CERTIFIED REGISTERED

## 2025-05-08 PROCEDURE — 25010000002 ONDANSETRON PER 1 MG: Performed by: NURSE ANESTHETIST, CERTIFIED REGISTERED

## 2025-05-08 PROCEDURE — 25010000002 LIDOCAINE PF 1% 1 % SOLUTION: Performed by: SURGERY

## 2025-05-08 PROCEDURE — 25010000002 PROPOFOL 10 MG/ML EMULSION: Performed by: NURSE ANESTHETIST, CERTIFIED REGISTERED

## 2025-05-08 PROCEDURE — 88304 TISSUE EXAM BY PATHOLOGIST: CPT | Performed by: SURGERY

## 2025-05-08 PROCEDURE — 25010000002 FAMOTIDINE 10 MG/ML SOLUTION: Performed by: SURGERY

## 2025-05-08 PROCEDURE — 25010000002 CEFAZOLIN 3 G RECONSTITUTED SOLUTION 1 EACH VIAL: Performed by: SURGERY

## 2025-05-08 PROCEDURE — S0260 H&P FOR SURGERY: HCPCS | Performed by: PHYSICIAN ASSISTANT

## 2025-05-08 PROCEDURE — 81025 URINE PREGNANCY TEST: CPT | Performed by: SURGERY

## 2025-05-08 DEVICE — LIGACLIP 10-M/L, 10MM ENDOSCOPIC ROTATING MULTIPLE CLIP APPLIERS
Type: IMPLANTABLE DEVICE | Site: ABDOMEN | Status: FUNCTIONAL
Brand: LIGACLIP

## 2025-05-08 RX ORDER — DEXMEDETOMIDINE HYDROCHLORIDE 100 UG/ML
INJECTION, SOLUTION INTRAVENOUS AS NEEDED
Status: DISCONTINUED | OUTPATIENT
Start: 2025-05-08 | End: 2025-05-08 | Stop reason: SURG

## 2025-05-08 RX ORDER — FENTANYL CITRATE 50 UG/ML
50 INJECTION, SOLUTION INTRAMUSCULAR; INTRAVENOUS
Status: DISCONTINUED | OUTPATIENT
Start: 2025-05-08 | End: 2025-05-08 | Stop reason: HOSPADM

## 2025-05-08 RX ORDER — DROPERIDOL 2.5 MG/ML
0.62 INJECTION, SOLUTION INTRAMUSCULAR; INTRAVENOUS ONCE AS NEEDED
Status: DISCONTINUED | OUTPATIENT
Start: 2025-05-08 | End: 2025-05-08 | Stop reason: HOSPADM

## 2025-05-08 RX ORDER — LABETALOL HYDROCHLORIDE 5 MG/ML
5 INJECTION, SOLUTION INTRAVENOUS
Status: DISCONTINUED | OUTPATIENT
Start: 2025-05-08 | End: 2025-05-08 | Stop reason: HOSPADM

## 2025-05-08 RX ORDER — DEXAMETHASONE SODIUM PHOSPHATE 4 MG/ML
INJECTION, SOLUTION INTRA-ARTICULAR; INTRALESIONAL; INTRAMUSCULAR; INTRAVENOUS; SOFT TISSUE AS NEEDED
Status: DISCONTINUED | OUTPATIENT
Start: 2025-05-08 | End: 2025-05-08 | Stop reason: SURG

## 2025-05-08 RX ORDER — SODIUM CHLORIDE 9 MG/ML
INJECTION, SOLUTION INTRAVENOUS AS NEEDED
Status: DISCONTINUED | OUTPATIENT
Start: 2025-05-08 | End: 2025-05-08 | Stop reason: HOSPADM

## 2025-05-08 RX ORDER — DROPERIDOL 2.5 MG/ML
0.62 INJECTION, SOLUTION INTRAMUSCULAR; INTRAVENOUS
Status: DISCONTINUED | OUTPATIENT
Start: 2025-05-08 | End: 2025-05-08 | Stop reason: HOSPADM

## 2025-05-08 RX ORDER — HYDROCODONE BITARTRATE AND ACETAMINOPHEN 7.5; 325 MG/1; MG/1
1 TABLET ORAL EVERY 4 HOURS PRN
Status: DISCONTINUED | OUTPATIENT
Start: 2025-05-08 | End: 2025-05-08 | Stop reason: HOSPADM

## 2025-05-08 RX ORDER — LIDOCAINE HYDROCHLORIDE 10 MG/ML
0.5 INJECTION, SOLUTION EPIDURAL; INFILTRATION; INTRACAUDAL; PERINEURAL ONCE AS NEEDED
Status: COMPLETED | OUTPATIENT
Start: 2025-05-08 | End: 2025-05-08

## 2025-05-08 RX ORDER — TRAMADOL HYDROCHLORIDE 50 MG/1
50 TABLET ORAL ONCE AS NEEDED
Status: COMPLETED | OUTPATIENT
Start: 2025-05-08 | End: 2025-05-08

## 2025-05-08 RX ORDER — ONDANSETRON 2 MG/ML
INJECTION INTRAMUSCULAR; INTRAVENOUS
Status: COMPLETED
Start: 2025-05-08 | End: 2025-05-08

## 2025-05-08 RX ORDER — TRAMADOL HYDROCHLORIDE 50 MG/1
50 TABLET ORAL EVERY 6 HOURS PRN
Qty: 10 TABLET | Refills: 0 | Status: SHIPPED | OUTPATIENT
Start: 2025-05-08

## 2025-05-08 RX ORDER — NALOXONE HCL 0.4 MG/ML
0.4 VIAL (ML) INJECTION AS NEEDED
Status: DISCONTINUED | OUTPATIENT
Start: 2025-05-08 | End: 2025-05-08 | Stop reason: HOSPADM

## 2025-05-08 RX ORDER — DOCUSATE SODIUM 100 MG/1
100 CAPSULE, LIQUID FILLED ORAL 2 TIMES DAILY PRN
Qty: 30 CAPSULE | Refills: 1 | Status: SHIPPED | OUTPATIENT
Start: 2025-05-08 | End: 2026-05-08

## 2025-05-08 RX ORDER — ONDANSETRON 2 MG/ML
INJECTION INTRAMUSCULAR; INTRAVENOUS AS NEEDED
Status: DISCONTINUED | OUTPATIENT
Start: 2025-05-08 | End: 2025-05-08 | Stop reason: SURG

## 2025-05-08 RX ORDER — HYDRALAZINE HYDROCHLORIDE 20 MG/ML
5 INJECTION INTRAMUSCULAR; INTRAVENOUS
Status: DISCONTINUED | OUTPATIENT
Start: 2025-05-08 | End: 2025-05-08 | Stop reason: HOSPADM

## 2025-05-08 RX ORDER — HYDROCODONE BITARTRATE AND ACETAMINOPHEN 5; 325 MG/1; MG/1
1 TABLET ORAL ONCE AS NEEDED
Status: DISCONTINUED | OUTPATIENT
Start: 2025-05-08 | End: 2025-05-08 | Stop reason: HOSPADM

## 2025-05-08 RX ORDER — FAMOTIDINE 20 MG/1
20 TABLET, FILM COATED ORAL
Status: COMPLETED | OUTPATIENT
Start: 2025-05-08 | End: 2025-05-08

## 2025-05-08 RX ORDER — SODIUM CHLORIDE, SODIUM LACTATE, POTASSIUM CHLORIDE, CALCIUM CHLORIDE 600; 310; 30; 20 MG/100ML; MG/100ML; MG/100ML; MG/100ML
9 INJECTION, SOLUTION INTRAVENOUS CONTINUOUS
Status: DISCONTINUED | OUTPATIENT
Start: 2025-05-08 | End: 2025-05-08 | Stop reason: HOSPADM

## 2025-05-08 RX ORDER — FAMOTIDINE 10 MG/ML
20 INJECTION, SOLUTION INTRAVENOUS
Status: COMPLETED | OUTPATIENT
Start: 2025-05-08 | End: 2025-05-08

## 2025-05-08 RX ORDER — FENTANYL CITRATE 50 UG/ML
INJECTION, SOLUTION INTRAMUSCULAR; INTRAVENOUS AS NEEDED
Status: DISCONTINUED | OUTPATIENT
Start: 2025-05-08 | End: 2025-05-08 | Stop reason: SURG

## 2025-05-08 RX ORDER — SODIUM CHLORIDE 0.9 % (FLUSH) 0.9 %
10 SYRINGE (ML) INJECTION AS NEEDED
Status: DISCONTINUED | OUTPATIENT
Start: 2025-05-08 | End: 2025-05-08 | Stop reason: HOSPADM

## 2025-05-08 RX ORDER — BUPIVACAINE HYDROCHLORIDE AND EPINEPHRINE 2.5; 5 MG/ML; UG/ML
INJECTION, SOLUTION EPIDURAL; INFILTRATION; INTRACAUDAL; PERINEURAL AS NEEDED
Status: DISCONTINUED | OUTPATIENT
Start: 2025-05-08 | End: 2025-05-08 | Stop reason: HOSPADM

## 2025-05-08 RX ORDER — PROMETHAZINE HYDROCHLORIDE 25 MG/1
25 TABLET ORAL ONCE AS NEEDED
Status: DISCONTINUED | OUTPATIENT
Start: 2025-05-08 | End: 2025-05-08 | Stop reason: HOSPADM

## 2025-05-08 RX ORDER — MIDAZOLAM HYDROCHLORIDE 1 MG/ML
2 INJECTION, SOLUTION INTRAMUSCULAR; INTRAVENOUS ONCE
Status: COMPLETED | OUTPATIENT
Start: 2025-05-08 | End: 2025-05-08

## 2025-05-08 RX ORDER — IPRATROPIUM BROMIDE AND ALBUTEROL SULFATE 2.5; .5 MG/3ML; MG/3ML
3 SOLUTION RESPIRATORY (INHALATION) ONCE AS NEEDED
Status: DISCONTINUED | OUTPATIENT
Start: 2025-05-08 | End: 2025-05-08 | Stop reason: HOSPADM

## 2025-05-08 RX ORDER — FENTANYL CITRATE 50 UG/ML
INJECTION, SOLUTION INTRAMUSCULAR; INTRAVENOUS
Status: COMPLETED
Start: 2025-05-08 | End: 2025-05-08

## 2025-05-08 RX ORDER — SODIUM CHLORIDE 0.9 % (FLUSH) 0.9 %
3 SYRINGE (ML) INJECTION EVERY 12 HOURS SCHEDULED
Status: DISCONTINUED | OUTPATIENT
Start: 2025-05-08 | End: 2025-05-08 | Stop reason: HOSPADM

## 2025-05-08 RX ORDER — PROPOFOL 10 MG/ML
VIAL (ML) INTRAVENOUS AS NEEDED
Status: DISCONTINUED | OUTPATIENT
Start: 2025-05-08 | End: 2025-05-08 | Stop reason: SURG

## 2025-05-08 RX ORDER — ONDANSETRON 2 MG/ML
4 INJECTION INTRAMUSCULAR; INTRAVENOUS ONCE AS NEEDED
Status: COMPLETED | OUTPATIENT
Start: 2025-05-08 | End: 2025-05-08

## 2025-05-08 RX ORDER — PROMETHAZINE HYDROCHLORIDE 25 MG/1
25 SUPPOSITORY RECTAL ONCE AS NEEDED
Status: DISCONTINUED | OUTPATIENT
Start: 2025-05-08 | End: 2025-05-08 | Stop reason: HOSPADM

## 2025-05-08 RX ORDER — SODIUM CHLORIDE 9 MG/ML
9 INJECTION, SOLUTION INTRAVENOUS AS NEEDED
Status: DISCONTINUED | OUTPATIENT
Start: 2025-05-08 | End: 2025-05-08 | Stop reason: HOSPADM

## 2025-05-08 RX ORDER — SUCCINYLCHOLINE/SOD CL,ISO/PF 200MG/10ML
SYRINGE (ML) INTRAVENOUS AS NEEDED
Status: DISCONTINUED | OUTPATIENT
Start: 2025-05-08 | End: 2025-05-08 | Stop reason: SURG

## 2025-05-08 RX ORDER — SODIUM CHLORIDE, SODIUM LACTATE, POTASSIUM CHLORIDE, CALCIUM CHLORIDE 600; 310; 30; 20 MG/100ML; MG/100ML; MG/100ML; MG/100ML
1000 INJECTION, SOLUTION INTRAVENOUS CONTINUOUS
Status: DISCONTINUED | OUTPATIENT
Start: 2025-05-08 | End: 2025-05-08 | Stop reason: HOSPADM

## 2025-05-08 RX ORDER — SODIUM CHLORIDE 0.9 % (FLUSH) 0.9 %
3-10 SYRINGE (ML) INJECTION AS NEEDED
Status: DISCONTINUED | OUTPATIENT
Start: 2025-05-08 | End: 2025-05-08 | Stop reason: HOSPADM

## 2025-05-08 RX ORDER — TRAMADOL HYDROCHLORIDE 50 MG/1
TABLET ORAL
Status: COMPLETED
Start: 2025-05-08 | End: 2025-05-08

## 2025-05-08 RX ORDER — LIDOCAINE HYDROCHLORIDE 10 MG/ML
INJECTION, SOLUTION EPIDURAL; INFILTRATION; INTRACAUDAL; PERINEURAL AS NEEDED
Status: DISCONTINUED | OUTPATIENT
Start: 2025-05-08 | End: 2025-05-08 | Stop reason: SURG

## 2025-05-08 RX ORDER — ROCURONIUM BROMIDE 10 MG/ML
INJECTION, SOLUTION INTRAVENOUS AS NEEDED
Status: DISCONTINUED | OUTPATIENT
Start: 2025-05-08 | End: 2025-05-08 | Stop reason: SURG

## 2025-05-08 RX ORDER — HYDROMORPHONE HYDROCHLORIDE 1 MG/ML
0.5 INJECTION, SOLUTION INTRAMUSCULAR; INTRAVENOUS; SUBCUTANEOUS
Status: DISCONTINUED | OUTPATIENT
Start: 2025-05-08 | End: 2025-05-08 | Stop reason: HOSPADM

## 2025-05-08 RX ADMIN — SODIUM CHLORIDE, POTASSIUM CHLORIDE, SODIUM LACTATE AND CALCIUM CHLORIDE: 600; 310; 30; 20 INJECTION, SOLUTION INTRAVENOUS at 09:06

## 2025-05-08 RX ADMIN — FENTANYL CITRATE 50 MCG: 50 INJECTION, SOLUTION INTRAMUSCULAR; INTRAVENOUS at 09:34

## 2025-05-08 RX ADMIN — LIDOCAINE HYDROCHLORIDE 0.5 ML: 10 INJECTION, SOLUTION EPIDURAL; INFILTRATION; INTRACAUDAL; PERINEURAL at 06:59

## 2025-05-08 RX ADMIN — FENTANYL CITRATE 50 MCG: 50 INJECTION, SOLUTION INTRAMUSCULAR; INTRAVENOUS at 10:06

## 2025-05-08 RX ADMIN — ONDANSETRON 4 MG: 2 INJECTION INTRAMUSCULAR; INTRAVENOUS at 11:03

## 2025-05-08 RX ADMIN — PROPOFOL 250 MG: 10 INJECTION, EMULSION INTRAVENOUS at 08:07

## 2025-05-08 RX ADMIN — DEXMEDETOMIDINE HYDROCHLORIDE 20 MCG: 100 INJECTION, SOLUTION INTRAVENOUS at 09:35

## 2025-05-08 RX ADMIN — PROPOFOL 50 MG: 10 INJECTION, EMULSION INTRAVENOUS at 08:17

## 2025-05-08 RX ADMIN — ONDANSETRON 4 MG: 2 INJECTION INTRAMUSCULAR; INTRAVENOUS at 08:38

## 2025-05-08 RX ADMIN — SUGAMMADEX 400 MG: 100 INJECTION, SOLUTION INTRAVENOUS at 09:01

## 2025-05-08 RX ADMIN — FAMOTIDINE 20 MG: 10 INJECTION, SOLUTION INTRAVENOUS at 07:04

## 2025-05-08 RX ADMIN — SODIUM CHLORIDE, POTASSIUM CHLORIDE, SODIUM LACTATE AND CALCIUM CHLORIDE 1000 ML: 600; 310; 30; 20 INJECTION, SOLUTION INTRAVENOUS at 06:59

## 2025-05-08 RX ADMIN — HYDROMORPHONE HYDROCHLORIDE 0.5 MG: 1 INJECTION, SOLUTION INTRAMUSCULAR; INTRAVENOUS; SUBCUTANEOUS at 09:51

## 2025-05-08 RX ADMIN — MIDAZOLAM HYDROCHLORIDE 2 MG: 1 INJECTION, SOLUTION INTRAMUSCULAR; INTRAVENOUS at 07:38

## 2025-05-08 RX ADMIN — FENTANYL CITRATE 50 MCG: 50 INJECTION, SOLUTION INTRAMUSCULAR; INTRAVENOUS at 08:55

## 2025-05-08 RX ADMIN — FENTANYL CITRATE 50 MCG: 50 INJECTION, SOLUTION INTRAMUSCULAR; INTRAVENOUS at 08:20

## 2025-05-08 RX ADMIN — FENTANYL CITRATE 100 MCG: 50 INJECTION, SOLUTION INTRAMUSCULAR; INTRAVENOUS at 08:07

## 2025-05-08 RX ADMIN — Medication 200 MG: at 08:08

## 2025-05-08 RX ADMIN — TRAMADOL HYDROCHLORIDE 50 MG: 50 TABLET ORAL at 10:19

## 2025-05-08 RX ADMIN — ROCURONIUM 50 MG: 50 INJECTION, SOLUTION INTRAVENOUS at 08:12

## 2025-05-08 RX ADMIN — SODIUM CHLORIDE 3000 MG: 900 INJECTION INTRAVENOUS at 08:00

## 2025-05-08 RX ADMIN — LIDOCAINE HYDROCHLORIDE 50 MG: 10 INJECTION, SOLUTION EPIDURAL; INFILTRATION; INTRACAUDAL; PERINEURAL at 08:07

## 2025-05-08 RX ADMIN — TRAMADOL HYDROCHLORIDE 50 MG: 50 TABLET, COATED ORAL at 10:19

## 2025-05-08 RX ADMIN — DEXAMETHASONE SODIUM PHOSPHATE 8 MG: 4 INJECTION INTRA-ARTICULAR; INTRALESIONAL; INTRAMUSCULAR; INTRAVENOUS; SOFT TISSUE at 08:16

## 2025-05-08 NOTE — ANESTHESIA PROCEDURE NOTES
Airway  Reason: elective    Date/Time: 5/8/2025 8:09 AM  Airway not difficult    General Information and Staff    Patient location during procedure: OR  CRNA/CAA: Pam Patel CRNA    Indications and Patient Condition  Indications for airway management: airway protection    Preoxygenated: yes    Mask difficulty assessment: 0 - not attempted    Final Airway Details    Final airway type: endotracheal airway      Successful airway: ETT  Cuffed: yes   Successful intubation technique: video laryngoscopy  Adjuncts used in placement: intubating stylet  Endotracheal tube insertion site: oral  Blade: Murphy  Blade size: 3  ETT size (mm): 7.0  Cormack-Lehane Classification: grade I - full view of glottis  Placement verified by: chest auscultation and capnometry   Measured from: lips  ETT/EBT  to lips (cm): 21  Number of attempts at approach: 1  Assessment: lips, teeth, and gum same as pre-op and atraumatic intubation    Additional Comments  Negative epigastric sounds, Breath sound equal bilaterally with symmetric chest rise and fall

## 2025-05-08 NOTE — H&P
"Pre-Op H&P  Lilly Segovia  [id number]  1995    Chief complaint: \"It is my gallbladder\"    HPI:    Patient is a 29 y.o.female who presents with pain in the right upper quadrant was bad enough that came to the emergency room a couple of months back.  She was found to have gallstones.  The acute event was treated conservatively.  She continues to have mild symptoms but continuous.  She is now admitted for cholecystectomy.    Review of Systems:  General ROS: negative for chills, fever or skin lesions;  No changes since last office visit.  Neg for recent sick exposure  Cardiovascular ROS: no chest pain or dyspnea on exertion  Respiratory ROS: no cough, shortness of breath, or wheezing    Allergies:   Allergies   Allergen Reactions    Cetirizine Hives    Nsaids Other (See Comments)     Pt has one kidney    Oxycodone Anxiety     Severe anxiety       Home Meds:    No current facility-administered medications on file prior to encounter.     Current Outpatient Medications on File Prior to Encounter   Medication Sig Dispense Refill    albuterol sulfate  (90 Base) MCG/ACT inhaler Inhale 2 puffs Every 4 (Four) Hours As Needed for Wheezing. 18 g 0    clonazePAM (KlonoPIN) 0.5 MG tablet Take 1 tablet by mouth 2 (Two) Times a Day As Needed.      levothyroxine (SYNTHROID, LEVOTHROID) 112 MCG tablet TAKE 1 TABLET BY MOUTH ONCE DAILY 30 tablet 1    omeprazole (priLOSEC) 40 MG capsule TAKE 1 CAPSULE BY MOUTH ONCE DAILY 90 capsule 1    propranolol (INDERAL) 20 MG tablet Take 1 tablet by mouth 2 (Two) Times a Day. (Patient taking differently: Take 0.5 tablets by mouth 2 (Two) Times a Day.) 60 tablet 3    rizatriptan MLT (MAXALT-MLT) 5 MG disintegrating tablet Place 1 tablet on the tongue 1 (One) Time As Needed for Migraine. May repeat in 2 hours if needed 9 tablet 2    cyclobenzaprine (FLEXERIL) 5 MG tablet Take 1 tablet by mouth 3 (Three) Times a Day As Needed for Muscle Spasms. 30 tablet 0    fluticasone (Flonase) 50 " "MCG/ACT nasal spray 2 sprays into the nostril(s) as directed by provider Daily. 16 g 11    ondansetron (ZOFRAN) 4 MG tablet Take 1 tablet by mouth Every 6 (Six) Hours. 12 tablet 0    oxyCODONE-acetaminophen (PERCOCET) 5-325 MG per tablet Take 1 tablet by mouth Every 4 (Four) Hours As Needed for Moderate Pain for up to 12 doses. (Patient not taking: Reported on 4/28/2025) 12 tablet 0    QUEtiapine (SEROquel) 25 MG tablet TAKE 1/2 TO 1 TABLET BY MOUTH TWICE DAILY AS NEEDED FOR ANXIETY/PANIC 60 tablet 1       PMH:   Past Medical History:   Diagnosis Date    ADHD (attention deficit hyperactivity disorder)     Arthritis     Asthma     COVID 02/15/2022    Dyspepsia     Dysphagia     w/ meds (must crush)    Dysthymic disorder 05/03/2021    Formatting of this note might be different from the original.  Saw counselors and took medications as a teenager. Was in a mental hospital at one point. Unsure what the diagnoses were. States all medications (lexapro, paxil, prozac) made her feel like a zombie.  In early 2020, tried to take citalopram liquid for 1 week, had side effects (felt drugged up). Stopped medication. Feels like anxiety com    Fatigue     Gallstones     CT 1/2023    GERD (gastroesophageal reflux disease)     on PPI, denies prior eval    Gestational hypertension     Hypothyroidism     Kidney agenesis 04/19/2021    Formatting of this note might be different from the original.  Went to ER in April 2021 for pain, had CT scan which showed lack of a kidney.    Migraine 03/21/2019    Topamax w/ prn Tylenol + caffeine    Postoperative nausea     \"extreme nuasea\" after CSY    Preeclampsia 2019    PTSD (post-traumatic stress disorder) 12/01/2020    Formatting of this note might be different from the original.  Prior panic attacks.  Went on trip last month and had some anxiety over it.  Worried about a possible COVID exposure but, did not come down with it.   Feels like her anxiety is getting worse.  She is not working, " "she's at home all day with her daughter and the dog.  She feels like her anxiety is turning into rage, like she needs to yel    Sleep apnea     CPAP nightly     PSH:    Past Surgical History:   Procedure Laterality Date    COLONOSCOPY N/A 2023    Procedure: COLONOSCOPY;  Surgeon: Eder Fragoso MD;  Location:  [facility];  Service: Gastroenterology;  Laterality: N/A;    ENDOSCOPY N/A 2024    Procedure: ESOPHAGOGASTRODUODENOSCOPY WITH BIOPSY;  Surgeon: Jenelle Combs MD;  Location:  [facility];  Service: General;  Laterality: N/A;    WISDOM TOOTH EXTRACTION N/A      Patient denies allergy to contrast dye or latex  Immunization History:  Influenza: No  Pneumococcal: No  Tetanus: Probably not up-to-date    Social History:   Tobacco:   Social History     Tobacco Use   Smoking Status Former    Current packs/day: 0.00    Average packs/day: 0.5 packs/day for 6.0 years (3.0 ttl pk-yrs)    Types: Cigarettes    Start date: 2008    Quit date: 2014    Years since quittin.3    Passive exposure: Past   Smokeless Tobacco Never      Alcohol:     Social History     Substance and Sexual Activity   Alcohol Use Not Currently    Alcohol/week: 1.0 - 3.0 standard drink of alcohol    Types: 1 - 3 Glasses of wine per week       Vitals:           BP (!) 150/101 (BP Location: Right arm, Patient Position: Lying)   Pulse 89   Temp 97.9 °F (36.6 °C) (Temporal)   Resp 16   Ht 162.6 cm (64\")   Wt (!) 147 kg (325 lb)   SpO2 98%   BMI 55.79 kg/m²     Physical Exam:  General Appearance:    Alert, cooperative, no distress, appears stated age   Head:    Normocephalic, without obvious abnormality, atraumatic   Lungs:   Clear to auscultation bilaterally to the bases    Heart: S1-S2 without rubs murmurs or gallops    Abdomen:  Soft, nontender, bowel sounds present throughout.   Breast Exam:    deferred   Genitalia:    deferred   Extremities:   Extremities normal, atraumatic, no cyanosis " or edema   Skin:   Skin color, texture, turgor normal, no rashes or lesions   Neurologic:   Grossly intact   Results Review  LABS:  Lab Results   Component Value Date    WBC 6.74 05/01/2025    HGB 12.9 05/01/2025    HCT 40.9 05/01/2025    MCV 84.9 05/01/2025     05/01/2025    NEUTROABS 4.64 03/14/2025    GLUCOSE 91 03/14/2025    BUN 8 03/14/2025    CREATININE 0.66 03/14/2025    EGFRIFNONA >60 10/11/2021    EGFRIFAFRI >60 10/11/2021     03/14/2025    K 4.5 05/01/2025     03/14/2025    CO2 21.5 (L) 03/14/2025    CALCIUM 9.5 03/14/2025    ALBUMIN 4.3 03/14/2025    AST 22 03/14/2025    ALT 29 03/14/2025    BILITOT 0.2 03/14/2025       RADIOLOGY:  No radiology results for the last 3 days     I reviewed the patient's new clinical results.    Cancer Staging (if applicable)  Cancer Patient: __ yes __no __unknown; If yes, clinical stage T:__ N:__M:__, stage group or __N/A    Impression: Symptomatic gallstones  Morbid obesity  Anxiety  Panic attacks  Sleep apnea  Hypothyroidism      Plan: Laparoscopic cholecystectomy.      SRINIVAS Hernandez   05/08/25   6:47 AM EDT

## 2025-05-08 NOTE — ADDENDUM NOTE
Addendum  created 05/08/25 1059 by Pam Patel CRNA    Flowsheet accepted, Intraprocedure Event edited

## 2025-05-08 NOTE — OP NOTE
"Operative Report    Patient Name:  Lilly Segovia  YOB: 1995  [id number]  5/8/2025      PREOPERATIVE DIAGNOSIS: Symptomatic cholelithiasis      POSTOPERATIVE DIAGNOSIS: Same        PROCEDURE PERFORMED:     Laparoscopic cholecystectomy        SURGEON: Chun Flores MD      ASSISTANT: None        SPECIMENS: Gallbladder and contents        ANESTHESIA: General.       EBL: Minimal        FINDINGS:     Gallbladder containing multiple gallstones removed, no signs of active infection       INDICATIONS:      The patient is a 29 y.o. female with a history of abdominal pain, concerning for gallbladder disease. Pre-operative imaging confirmed the diagnosis of cholelithiasis. The risks and benefits of laparoscopic cholecystectomy were discussed with the patient and they agreed to proceed.       DESCRIPTION OF PROCEDURE:     After obtaining informed consent, the patient was taken to the operating room and placed in the supine position. After appropriate DVT and antibiotic prophylaxis, general anesthesia was induced. The abdomen was prepped and draped in standard sterile fashion. After infiltrating the skin with local anesthetic, a 5 mm skin incision was made in the left upper quadrant at Fortune's point.  A 5 mm laparoscopic port was placed into the peritoneal cavity using the Optiview technique.  The abdomen was insufflated with carbon dioxide gas to a pressure of 15 mmHg, and a laparoscope advanced through the trocar and the abdominal contents were inspected. There was no evidence of bowel, bladder, or visceral injury with entrance of the trocar. At this point, after infiltrating the skin with local anesthetic, a standard laparoscopic cholecystectomy trocar placement schema was followed.     The gallbladder was grasped and elevated superiorly. Using meticulous blunt dissection, the cystic duct and cystic artery were bluntly dissected free of other structures and clearly identified using the \"Critical " "View\" technique, with only two structures going into and out of the gallbladder. The cystic duct was then clipped at its junction with the infundibulum of the gallbladder with two clips placed proximally and one distally, and the duct was divided between the clips. The cystic artery was then clipped twice proximally and once distally, and divided between the clips.     The gallbladder was then dissected free of the gallbladder fossa using a combination of electrocautery and blunt dissection. The gallbladder was then placed in an Endo Catch bag, and removed from the supraumbilical trocar site.      The right upper quadrant was then inspected. The cystic duct and cystic artery stumps were intact without bleeding or biliary leak. The right upper quadrant was irrigated with saline until clear. The liver bed was hemostatic. The abdomen was deflated and reinsufflated to make sure pneumoperitoneum was not tamponading any bleeding and there was none.  The supraumbilical port site was removed and the fascial defect was reapproximated using an 0 Vicryl suture and the Everton-Jaky device.  All remaining trocars were removed under direct laparoscopic visualization. The skin incisions were closed in each area using absorbable subcuticular suture. The incisions were then dressed in standard sterile fashion. The patient recovered from anesthesia, was extubated in the operating room, and transferred to the PACU in stable condition.  All sponge and needle counts were correct times two at the completion of the procedure. There were no immediate complications.     Chun Flores MD  5/8/2025  09:17 EDT    "

## 2025-05-08 NOTE — NURSING NOTE
Caregiver Telephone Number    Phone Number for Ride/Caregiver: HANSEL GUILLEN (MYRIAM) [phone]     Who will be staying with you post procedure for the next 24 hours? Name and Phone Number?: HANSEL GUILLEN (MYRIAM) [phone]

## 2025-05-08 NOTE — BRIEF OP NOTE
CHOLECYSTECTOMY LAPAROSCOPIC  Progress Note    Lilly Arellano Fell  5/8/2025    Pre-op Diagnosis:      * Cholelithiasis        Post-Op Diagnosis Codes:     * Cholelithiasis     Procedure(s):      Procedure(s):  CHOLECYSTECTOMY LAPAROSCOPIC              Surgeon(s):  Chun Flores MD    Anesthesia: General    Staff:   Circulator: Angel Herron RN  Scrub Person: Grabiel Dexter RN  Nursing Assistant: Yue Lane PCT       Estimated Blood Loss: minimal    Urine Voided: * No values recorded between 5/8/2025  8:00 AM and 5/8/2025  8:58 AM *    Specimens:                Specimens       ID Source Type Tests Collected By Collected At Frozen?    A Gallbladder Tissue TISSUE PATHOLOGY EXAM   Chun Flores MD 5/8/25 0836 No    Description: GALLBLADDER FOR PERMANENT    This specimen was not marked as sent.              Drains: * No LDAs found *    Findings: Gallbladder containing multiple gallstones removed, no signs of active infection      Complications: None          Chun Flores MD     Date: 5/8/2025  Time: 09:15 EDT

## 2025-05-08 NOTE — ANESTHESIA PREPROCEDURE EVALUATION
Anesthesia Evaluation     Patient summary reviewed and Nursing notes reviewed   history of anesthetic complications:  PONV prolonged sedation  NPO Solid Status: > 8 hours  NPO Liquid Status: > 2 hours           Airway   Mallampati: II  TM distance: >3 FB  Neck ROM: full  Large neck circumference and Possible difficult intubation  Dental - normal exam     Pulmonary    (+) a smoker (Quit 2014) Former, asthma (PRN albuterol inhaler),sleep apnea on CPAP, decreased breath sounds  Cardiovascular - normal exam    ECG reviewed  Rhythm: regular  Rate: normal    (+) hypertension    ROS comment: EKG 3/14/25:  HR 70  Normal sinus rhythm  Normal ECG      Neuro/Psych  (+) headaches (Migraines), psychiatric history Anxiety, Depression, ADHD and PTSD  GI/Hepatic/Renal/Endo    (+) morbid obesity, GERD, renal disease (congential absence of one kidney)-, thyroid problem hypothyroidism    Musculoskeletal     Abdominal    Substance History - negative use     OB/GYN          Other   arthritis,     ROS/Med Hx Other: Labs 5/1/25:  Hgb 12.9, Plt 282  Cr 0.66, K 4.5              Anesthesia Plan    ASA 3     general     (Severe anxiety in pre-op; will give 2 mg Versed )  intravenous induction     Anesthetic plan, risks, benefits, and alternatives have been provided, discussed and informed consent has been obtained with: patient.    Plan discussed with CRNA.    CODE STATUS:

## 2025-05-08 NOTE — ANESTHESIA POSTPROCEDURE EVALUATION
Patient: Lilly Segovia    Procedure Summary       Date: 05/08/25 Room / Location:  [facility]    Anesthesia Start: 0800 Anesthesia Stop: 0924    Procedure: CHOLECYSTECTOMY LAPAROSCOPIC (Abdomen) Diagnosis: Cholelithiasis    Surgeons: Chun Flores MD Provider: Bhanu Camilo MD    Anesthesia Type: general ASA Status: 3            Anesthesia Type: general    Vitals  Vitals Value Taken Time   /100 05/08/25 09:24   Temp 97.9 °F (36.6 °C) 05/08/25 09:24   Pulse 81 05/08/25 09:24   Resp 16 05/08/25 09:24   SpO2 97 % 05/08/25 09:24           Post Anesthesia Care and Evaluation    Patient location during evaluation: PACU  Patient participation: waiting for patient participation  Level of consciousness: sleepy but conscious    Airway patency: patent  Anesthetic complications: No anesthetic complications  PONV Status: none  Cardiovascular status: blood pressure returned to baseline  Respiratory status: nasal cannula and spontaneous ventilation  Hydration status: acceptable  No anesthesia care post op

## 2025-05-12 DIAGNOSIS — E03.9 ACQUIRED HYPOTHYROIDISM: ICD-10-CM

## 2025-05-12 RX ORDER — LEVOTHYROXINE SODIUM 112 UG/1
112 TABLET ORAL DAILY
Qty: 90 TABLET | Refills: 0 | Status: SHIPPED | OUTPATIENT
Start: 2025-05-12

## 2025-05-12 NOTE — TELEPHONE ENCOUNTER
Caller: Lilly Segovia    Relationship: Self    Best call back number: [phone]     Requested Prescriptions:   Requested Prescriptions     Pending Prescriptions Disp Refills    levothyroxine (SYNTHROID, LEVOTHROID) 112 MCG tablet [Pharmacy Med Name: LEVOTHYROXINE SODIUM 112  Tablet] 30 tablet 2     Sig: TAKE 1 TABLET BY MOUTH ONCE DAILY        Pharmacy where request should be sent: [facility] [address] [phone] [facility] [phone] [facility]     Last office visit with prescribing clinician: 4/28/2025   Last telemedicine visit with prescribing clinician: Visit date not found   Next office visit with prescribing clinician: 7/28/2025     Additional details provided by patient: PATIENT IS OUT OF THE MEDICATION AND HAS BEEN FOR A FEW DAYS     Does the patient have less than a 3 day supply:  [x] Yes  [] No    Would you like a call back once the refill request has been completed: [x] Yes [] No    If the office needs to give you a call back, can they leave a voicemail: [x] Yes [] No    Joyce Lorenzana Rep   05/12/25 14:03 EDT

## 2025-06-19 ENCOUNTER — TELEPHONE (OUTPATIENT)
Dept: BARIATRICS/WEIGHT MGMT | Facility: CLINIC | Age: 30
End: 2025-06-19
Payer: COMMERCIAL

## 2025-06-19 NOTE — TELEPHONE ENCOUNTER
Pt called stating that she has had an emergent Cholecystectomy on 5/8/25 after being advised by our office to wait until Bariatric Surgery. Due to this, it has raised some concerns of hers. Pt stated that she is still in a lot of pain due to her surgery and is not going to be able to do surgery, especially if it is soon. Pt stated that she feels that she is not and will not be comfortable going through with surgery and would like to speak to Dr. Combs specifically about her concerns. Advised pt to schedule an office visit with Dr. Combs to discuss further. Pt understood and was transferred to  to schedule.

## 2025-06-22 ENCOUNTER — APPOINTMENT (OUTPATIENT)
Facility: HOSPITAL | Age: 30
End: 2025-06-22
Payer: COMMERCIAL

## 2025-06-22 ENCOUNTER — HOSPITAL ENCOUNTER (EMERGENCY)
Facility: HOSPITAL | Age: 30
Discharge: HOME | End: 2025-06-22
Attending: EMERGENCY MEDICINE | Admitting: STUDENT IN AN ORGANIZED HEALTH CARE EDUCATION/TRAINING PROGRAM
Payer: COMMERCIAL

## 2025-06-22 VITALS
OXYGEN SATURATION: 97 % | HEIGHT: 64 IN | HEART RATE: 81 BPM | TEMPERATURE: 98.2 F | SYSTOLIC BLOOD PRESSURE: 120 MMHG | RESPIRATION RATE: 18 BRPM | WEIGHT: 293 LBS | BODY MASS INDEX: 50.02 KG/M2 | DIASTOLIC BLOOD PRESSURE: 76 MMHG

## 2025-06-22 DIAGNOSIS — R51.9 NONINTRACTABLE HEADACHE, UNSPECIFIED CHRONICITY PATTERN, UNSPECIFIED HEADACHE TYPE: ICD-10-CM

## 2025-06-22 DIAGNOSIS — R07.89 ATYPICAL CHEST PAIN: Primary | ICD-10-CM

## 2025-06-22 LAB
ALBUMIN SERPL-MCNC: 4.4 G/DL (ref 3.5–5.2)
ALBUMIN/GLOB SERPL: 1.4 G/DL
ALP SERPL-CCNC: 83 U/L (ref 39–117)
ALT SERPL W P-5'-P-CCNC: 43 U/L (ref 1–33)
ANION GAP SERPL CALCULATED.3IONS-SCNC: 11.8 MMOL/L (ref 5–15)
AST SERPL-CCNC: 35 U/L (ref 1–32)
BASOPHILS # BLD AUTO: 0.03 10*3/MM3 (ref 0–0.2)
BASOPHILS NFR BLD AUTO: 0.4 % (ref 0–1.5)
BILIRUB SERPL-MCNC: 0.3 MG/DL (ref 0–1.2)
BUN SERPL-MCNC: 11 MG/DL (ref 6–20)
BUN/CREAT SERPL: 18 (ref 7–25)
CALCIUM SPEC-SCNC: 9.8 MG/DL (ref 8.6–10.5)
CHLORIDE SERPL-SCNC: 106 MMOL/L (ref 98–107)
CO2 SERPL-SCNC: 21.2 MMOL/L (ref 22–29)
CREAT SERPL-MCNC: 0.61 MG/DL (ref 0.57–1)
D DIMER PPP FEU-MCNC: 0.33 MCGFEU/ML (ref 0–0.5)
DEPRECATED RDW RBC AUTO: 41.9 FL (ref 37–54)
EGFRCR SERPLBLD CKD-EPI 2021: 122.8 ML/MIN/1.73
EOSINOPHIL # BLD AUTO: 0.09 10*3/MM3 (ref 0–0.4)
EOSINOPHIL NFR BLD AUTO: 1.1 % (ref 0.3–6.2)
ERYTHROCYTE [DISTWIDTH] IN BLOOD BY AUTOMATED COUNT: 13.8 % (ref 12.3–15.4)
GLOBULIN UR ELPH-MCNC: 3.1 GM/DL
GLUCOSE SERPL-MCNC: 109 MG/DL (ref 65–99)
HCG INTACT+B SERPL-ACNC: <0.2 MIU/ML
HCT VFR BLD AUTO: 37.6 % (ref 34–46.6)
HGB BLD-MCNC: 12.4 G/DL (ref 12–15.9)
IMM GRANULOCYTES # BLD AUTO: 0.02 10*3/MM3 (ref 0–0.05)
IMM GRANULOCYTES NFR BLD AUTO: 0.3 % (ref 0–0.5)
LYMPHOCYTES # BLD AUTO: 2.08 10*3/MM3 (ref 0.7–3.1)
LYMPHOCYTES NFR BLD AUTO: 26.2 % (ref 19.6–45.3)
MCH RBC QN AUTO: 27.2 PG (ref 26.6–33)
MCHC RBC AUTO-ENTMCNC: 33 G/DL (ref 31.5–35.7)
MCV RBC AUTO: 82.5 FL (ref 79–97)
MONOCYTES # BLD AUTO: 0.58 10*3/MM3 (ref 0.1–0.9)
MONOCYTES NFR BLD AUTO: 7.3 % (ref 5–12)
NEUTROPHILS NFR BLD AUTO: 5.14 10*3/MM3 (ref 1.7–7)
NEUTROPHILS NFR BLD AUTO: 64.7 % (ref 42.7–76)
PLATELET # BLD AUTO: 268 10*3/MM3 (ref 140–450)
PMV BLD AUTO: 10 FL (ref 6–12)
POTASSIUM SERPL-SCNC: 4.2 MMOL/L (ref 3.5–5.2)
PROT SERPL-MCNC: 7.5 G/DL (ref 6–8.5)
RBC # BLD AUTO: 4.56 10*6/MM3 (ref 3.77–5.28)
SODIUM SERPL-SCNC: 139 MMOL/L (ref 136–145)
TROPONIN T SERPL HS-MCNC: <6 NG/L
WBC NRBC COR # BLD AUTO: 7.94 10*3/MM3 (ref 3.4–10.8)

## 2025-06-22 PROCEDURE — 96374 THER/PROPH/DIAG INJ IV PUSH: CPT

## 2025-06-22 PROCEDURE — 85025 COMPLETE CBC W/AUTO DIFF WBC: CPT | Performed by: EMERGENCY MEDICINE

## 2025-06-22 PROCEDURE — 85379 FIBRIN DEGRADATION QUANT: CPT | Performed by: EMERGENCY MEDICINE

## 2025-06-22 PROCEDURE — 36415 COLL VENOUS BLD VENIPUNCTURE: CPT

## 2025-06-22 PROCEDURE — 84702 CHORIONIC GONADOTROPIN TEST: CPT | Performed by: EMERGENCY MEDICINE

## 2025-06-22 PROCEDURE — 93005 ELECTROCARDIOGRAM TRACING: CPT | Performed by: EMERGENCY MEDICINE

## 2025-06-22 PROCEDURE — 84484 ASSAY OF TROPONIN QUANT: CPT | Performed by: EMERGENCY MEDICINE

## 2025-06-22 PROCEDURE — 71045 X-RAY EXAM CHEST 1 VIEW: CPT

## 2025-06-22 PROCEDURE — 80053 COMPREHEN METABOLIC PANEL: CPT | Performed by: EMERGENCY MEDICINE

## 2025-06-22 PROCEDURE — 25010000002 FAMOTIDINE 10 MG/ML SOLUTION: Performed by: EMERGENCY MEDICINE

## 2025-06-22 PROCEDURE — 99284 EMERGENCY DEPT VISIT MOD MDM: CPT | Performed by: EMERGENCY MEDICINE

## 2025-06-22 RX ORDER — ACETAMINOPHEN 160 MG/5ML
500 SOLUTION ORAL ONCE
Status: COMPLETED | OUTPATIENT
Start: 2025-06-22 | End: 2025-06-22

## 2025-06-22 RX ORDER — ALUMINA, MAGNESIA, AND SIMETHICONE 2400; 2400; 240 MG/30ML; MG/30ML; MG/30ML
15 SUSPENSION ORAL ONCE
Status: COMPLETED | OUTPATIENT
Start: 2025-06-22 | End: 2025-06-22

## 2025-06-22 RX ORDER — FAMOTIDINE 10 MG/ML
20 INJECTION, SOLUTION INTRAVENOUS ONCE
Status: COMPLETED | OUTPATIENT
Start: 2025-06-22 | End: 2025-06-22

## 2025-06-22 RX ADMIN — ALUMINUM HYDROXIDE, MAGNESIUM HYDROXIDE, AND DIMETHICONE 15 ML: 400; 400; 40 SUSPENSION ORAL at 18:27

## 2025-06-22 RX ADMIN — FAMOTIDINE 20 MG: 10 INJECTION, SOLUTION INTRAVENOUS at 18:27

## 2025-06-22 RX ADMIN — ACETAMINOPHEN 500 MG: 160 SUSPENSION ORAL at 19:18

## 2025-06-22 NOTE — [facility] PROVIDER NOTE
Subjective  History of Present Illness:    Patient to the emergency department with left anterior chest pain somewhat radiating into her back with associated shortness of breath.  She states she has a history of GERD and this feels slightly different than her normal pain that she feels with reflux.  She denies any recent cough, congestion, fever, chills, nausea, vomiting.  Denies any diaphoresis.  States she has a history of anxiety and took a half of a Klonopin earlier.  States that it helped with her anxiety but did not significantly improve her chest pain.  States that she was a previous smoker.  Recent cholecystectomy less than 2 months ago.    Nurses Notes reviewed and agree, including vitals, allergies, social history and prior medical history.     REVIEW OF SYSTEMS: All systems reviewed and not pertinent unless noted.  Review of Systems   Respiratory:  Positive for shortness of breath.    Cardiovascular:  Positive for chest pain.       Past Medical History:   Diagnosis Date    ADHD (attention deficit hyperactivity disorder)     Arthritis     Asthma     COVID 02/15/2022    Dyspepsia     Dysphagia     w/ meds (must crush)    Dysthymic disorder 05/03/2021    Formatting of this note might be different from the original.  Saw counselors and took medications as a teenager. Was in a mental hospital at one point. Unsure what the diagnoses were. States all medications (lexapro, paxil, prozac) made her feel like a zombie.  In early 2020, tried to take citalopram liquid for 1 week, had side effects (felt drugged up). Stopped medication. Feels like anxiety com    Fatigue     Gallstones     CT 1/2023    GERD (gastroesophageal reflux disease)     on PPI, denies prior eval    Gestational hypertension     Hypothyroidism     Kidney agenesis 04/19/2021    Formatting of this note might be different from the original.  Went to ER in April 2021 for pain, had CT scan which showed lack of a kidney.    Migraine 03/21/2019    Topamax  "w/ prn Tylenol + caffeine    Postoperative nausea     \"extreme nuasea\" after CSY    Preeclampsia 2019    PTSD (post-traumatic stress disorder) 2020    Formatting of this note might be different from the original.  Prior panic attacks.  Went on trip last month and had some anxiety over it.  Worried about a possible COVID exposure but, did not come down with it.   Feels like her anxiety is getting worse.  She is not working, she's at home all day with her daughter and the dog.  She feels like her anxiety is turning into rage, like she needs to yel    Sleep apnea     CPAP nightly       Allergies:    Cetirizine, Nsaids, and Oxycodone      Past Surgical History:   Procedure Laterality Date    CHOLECYSTECTOMY N/A 2025    Procedure: CHOLECYSTECTOMY LAPAROSCOPIC;  Surgeon: Chun Flores MD;  Location:  [facility];  Service: General;  Laterality: N/A;    COLONOSCOPY N/A 2023    Procedure: COLONOSCOPY;  Surgeon: Eder Fragoso MD;  Location:  [facility];  Service: Gastroenterology;  Laterality: N/A;    ENDOSCOPY N/A 2024    Procedure: ESOPHAGOGASTRODUODENOSCOPY WITH BIOPSY;  Surgeon: Jenelle Combs MD;  Location:  [facility];  Service: General;  Laterality: N/A;    WISDOM TOOTH EXTRACTION N/A          Social History     Socioeconomic History    Marital status: Single    Number of children: 1    Highest education level: High school graduate   Tobacco Use    Smoking status: Former     Current packs/day: 0.00     Average packs/day: 0.5 packs/day for 6.0 years (3.0 ttl pk-yrs)     Types: Cigarettes     Start date: 2008     Quit date: 2014     Years since quittin.4     Passive exposure: Past    Smokeless tobacco: Never   Vaping Use    Vaping status: Never Used   Substance and Sexual Activity    Alcohol use: Not Currently     Alcohol/week: 1.0 - 3.0 standard drink of alcohol     Types: 1 - 3 Glasses of wine per week    Drug use: Not Currently     Types: " "Marijuana    Sexual activity: Yes     Partners: Male     Birth control/protection: None         Family History   Problem Relation Age of Onset    Anxiety disorder Mother     Depression Mother     Hypertension Mother     Diabetes Mother     Diverticulitis Mother     Pancreatitis Mother     Cholelithiasis Mother     Asthma Mother     Anxiety disorder Maternal Grandmother     Depression Maternal Grandmother     Obesity Maternal Grandmother     Diabetes Maternal Grandmother     Hypertension Maternal Grandmother     Heart disease Maternal Grandmother 70        cause of death    Sleep apnea Maternal Grandmother     Kidney failure Maternal Grandmother     Depression Maternal Aunt     Anxiety disorder Maternal Aunt        Objective  Physical Exam:  /87   Pulse 82   Temp 98.2 °F (36.8 °C) (Oral)   Resp 18   Ht 163 cm (64.17\")   Wt (!) 145 kg (319 lb 10.7 oz)   LMP 06/16/2025 (Approximate)   SpO2 98%   BMI 54.58 kg/m²      Physical Exam  Vitals and nursing note reviewed.   Constitutional:       General: She is not in acute distress.     Appearance: She is well-developed. She is obese. She is not ill-appearing, toxic-appearing or diaphoretic.   HENT:      Head: Normocephalic and atraumatic.   Eyes:      Extraocular Movements: Extraocular movements intact.      Pupils: Pupils are equal, round, and reactive to light.   Neck:      Comments: Left lateral cervical spine tenderness radiating in the supraspinatus region  Cardiovascular:      Rate and Rhythm: Normal rate and regular rhythm.      Heart sounds: Normal heart sounds.   Pulmonary:      Effort: Pulmonary effort is normal.      Breath sounds: Decreased breath sounds present. No wheezing, rhonchi or rales.   Chest:      Chest wall: No mass, deformity, tenderness, crepitus or edema.   Abdominal:      General: Bowel sounds are normal.      Palpations: Abdomen is soft.      Tenderness: There is no abdominal tenderness.   Musculoskeletal:         General: Normal " range of motion.   Skin:     General: Skin is warm.      Capillary Refill: Capillary refill takes less than 2 seconds.   Neurological:      General: No focal deficit present.      Mental Status: She is alert and oriented to person, place, and time.   Psychiatric:         Mood and Affect: Mood normal. Mood is not anxious.         Behavior: Behavior normal. Behavior is not agitated.         ECG 12 Lead Chest Pain      Date/Time: 6/22/2025 6:07 PM    Performed by: Allan Mustafa DO  Authorized by: Allan Mustafa DO  Interpreted by ED physician  Comparison: not compared with previous ECG   Rhythm: sinus rhythm  Rate: normal  BPM: 83  QRS axis: normal  Conduction: conduction normal  ST Segments: ST segments normal  T Waves: T waves normal  Clinical impression: normal ECG        ED Course:         Lab Results (last 24 hours)       Procedure Component Value Units Date/Time    CBC & Differential [[id number]]  (Normal) Collected: 06/22/25 1824    Specimen: Blood Updated: 06/22/25 1832    Narrative:      The following orders were created for panel order CBC & Differential.  Procedure                               Abnormality         Status                     ---------                               -----------         ------                     CBC Auto Differential[[id number]]        Normal              Final result                 Please view results for these tests on the individual orders.    Comprehensive Metabolic Panel [[id number]]  (Abnormal) Collected: 06/22/25 1824    Specimen: Blood Updated: 06/22/25 1900     Glucose 109 mg/dL      BUN 11.0 mg/dL      Creatinine 0.61 mg/dL      Sodium 139 mmol/L      Potassium 4.2 mmol/L      Chloride 106 mmol/L      CO2 21.2 mmol/L      Calcium 9.8 mg/dL      Total Protein 7.5 g/dL      Albumin 4.4 g/dL      ALT (SGPT) 43 U/L      AST (SGOT) 35 U/L      Alkaline Phosphatase 83 U/L      Total Bilirubin 0.3 mg/dL      Globulin 3.1 gm/dL      A/G Ratio 1.4 g/dL       "BUN/Creatinine Ratio 18.0     Anion Gap 11.8 mmol/L      eGFR 122.8 mL/min/1.73     Narrative:      GFR Categories in Chronic Kidney Disease (CKD)              GFR Category          GFR (mL/min/1.73)    Interpretation  G1                    90 or greater        Normal or high (1)  G2                    60-89                Mild decrease (1)  G3a                   45-59                Mild to moderate decrease  G3b                   30-44                Moderate to severe decrease  G4                    15-29                Severe decrease  G5                    14 or less           Kidney failure    (1)In the absence of evidence of kidney disease, neither GFR category G1 or G2 fulfill the criteria for CKD.    eGFR calculation 2021 CKD-EPI creatinine equation, which does not include race as a factor    D-dimer, Quantitative [[id number]]  (Normal) Collected: 06/22/25 1824    Specimen: Blood Updated: 06/22/25 1856     D-Dimer, Quantitative 0.33 MCGFEU/mL     Narrative:      According to the assay 's published package insert, a normal (<0.50 MCGFEU/mL) D-dimer result in conjunction with a non-high clinical probability assessment, excludes deep vein thrombosis (DVT) and pulmonary embolism (PE) with high sensitivity.    D-dimer values increase with age and this can make VTE exclusion of an older population difficult. To address this, the [employer], based on best available evidence and recent guidelines, recommends that clinicians use age-adjusted D-dimer thresholds in patients greater than 50 years of age with: a) a low probability of PE who do not meet all Pulmonary Embolism Rule Out Criteria, or b) in those with intermediate probability of PE.   The formula for an age-adjusted D-dimer cut-off is \"age/100\".  For example, a 60 year old patient would have an age-adjusted cut-off of 0.60 MCGFEU/mL and an 80 year old 0.80 MCGFEU/mL.    High Sensitivity Troponin T [[id number]]  (Normal) " Collected: 06/22/25 1824    Specimen: Blood Updated: 06/22/25 1900     HS Troponin T <6 ng/L     hCG, Quantitative, Pregnancy [[id number]] Collected: 06/22/25 1824    Specimen: Blood Updated: 06/22/25 1910     HCG Quantitative <0.20 mIU/mL     Narrative:      HCG Ranges by Gestational Age    Females - non-pregnant premenopausal   </= 1mIU/mL HCG  Females - postmenopausal               </= 7mIU/mL HCG    3 Weeks         5.8 -    71.2 mIU/mL  4 Weeks         9.5 -     750 mIU/mL  5 Weeks         217 -   7,138 mIU/mL  6 Weeks         158 -  31,795 mIU/mL  7 Weeks       3,697 - 163,563 mIU/mL  8 Weeks      32,065 - 149,571 mIU/mL  9 Weeks      63,803 - 151,410 mIU/mL  10 Weeks     46,509 - 186,977 mIU/mL  12 Weeks     27,832 - 210,612 mIU/mL  14 Weeks     13,950 -  62,530 mIU/mL  15 Weeks     12,039 -  70,971 mIU/mL  16 Weeks      9,040 -  56,451 mIU/mL  17 Weeks      8,175 -  55,868 mIU/mL  18 Weeks      8,099 -  58,176 mIU/mL    CBC Auto Differential [[id number]]  (Normal) Collected: 06/22/25 1824    Specimen: Blood Updated: 06/22/25 1832     WBC 7.94 10*3/mm3      RBC 4.56 10*6/mm3      Hemoglobin 12.4 g/dL      Hematocrit 37.6 %      MCV 82.5 fL      MCH 27.2 pg      MCHC 33.0 g/dL      RDW 13.8 %      RDW-SD 41.9 fl      MPV 10.0 fL      Platelets 268 10*3/mm3      Neutrophil % 64.7 %      Lymphocyte % 26.2 %      Monocyte % 7.3 %      Eosinophil % 1.1 %      Basophil % 0.4 %      Immature Grans % 0.3 %      Neutrophils, Absolute 5.14 10*3/mm3      Lymphocytes, Absolute 2.08 10*3/mm3      Monocytes, Absolute 0.58 10*3/mm3      Eosinophils, Absolute 0.09 10*3/mm3      Basophils, Absolute 0.03 10*3/mm3      Immature Grans, Absolute 0.02 10*3/mm3              XR Chest 1 View  Result Date: 6/22/2025  XR CHEST 1 VW Date of Exam: 6/22/2025 6:32 PM EDT Indication: left sided CP Comparison: Two-view chest x-ray 1/4/2025 Findings: Lungs are adequately expanded and appear clear. No pneumothorax or large pleural effusion is  seen. Cardiomediastinal contours appear within normal limits.     Impression: Impression: No acute cardiopulmonary abnormality is identified. Electronically Signed: Gypsy Duncan MD  6/22/2025 6:49 PM EDT  Workstation ID: [id number]         [facility]  Number of Diagnoses or Management Options  Atypical chest pain  Nonintractable headache, unspecified chronicity pattern, unspecified headache type  Diagnosis management comments: Resumed care of patient for chest wall and other pains.  Improved prior to evaluation.  Patient notes that she has mild headache would like liquid Tylenol which has been ordered.  Differential including cardiopulmonary emergencies and other concerns and imaging and workup were negative for these.  Most consistent with GERD, musculoskeletal complaints.  Patient agrees to close follow-up with return precautions for any worsening or further concerns.  Discharged in stable condition.       Amount and/or Complexity of Data Reviewed  Clinical lab tests: reviewed  Tests in the radiology section of CPT®: reviewed  Tests in the medicine section of CPT®: reviewed  Independent visualization of images, tracings, or specimens: yes        Medications   aluminum-magnesium hydroxide-simethicone (MAALOX MAX) 400-400-40 MG/5ML suspension 15 mL (15 mL Oral Given 6/22/25 1827)   famotidine (PEPCID) injection 20 mg (20 mg Intravenous Given 6/22/25 1827)   acetaminophen (TYLENOL) 160 MG/5ML oral solution 500 mg (500 mg Oral Given 6/22/25 1918)         Data interpreted: Nursing notes reviewed, vital signs reviewed.  Labs independently interpreted by me (CBC, CMP, lipase, UA, troponin, ABG, lactic acid, procalcitonin).  Imaging independently interpreted by me (x-ray, CT scan).  EKG independently interpreted by me.  O2 saturation:    Counseling: Discussed the results above with the patient regarding need for admission or discharge.  Patient understands and agrees plan of care.      -----  ED Disposition       ED Disposition    Discharge    Condition   Stable    Comment   --             Final diagnoses:   Atypical chest pain   Nonintractable headache, unspecified chronicity pattern, unspecified headache type      Your Follow-Up Providers       Dior Bach APRN In 3 days.    Specialties: Nurse Practitioner, Family Medicine  [address]  [address]  [facility] [address]  [phone]               [facility] EM[facility] [place].    Specialty: Emergency Medicine  Follow up details: As needed  [address]  [facility] [address] [phone]                     Contact information for after-discharge care    Follow-up information has not been specified.                    Your medication list        CHANGE how you take these medications        Instructions Last Dose Given Next Dose Due   propranolol 20 MG tablet  Commonly known as: INDERAL  What changed: how much to take      Take 1 tablet by mouth 2 (Two) Times a Day.       Wellbutrin  MG 12 hr tablet  Generic drug: buPROPion SR  What changed: additional instructions      Take 1 tablet by mouth Daily.              CONTINUE taking these medications        Instructions Last Dose Given Next Dose Due   albuterol sulfate  (90 Base) MCG/ACT inhaler  Commonly known as: PROVENTIL HFA;VENTOLIN HFA;PROAIR HFA      Inhale 2 puffs Every 4 (Four) Hours As Needed for Wheezing.       clonazePAM 0.5 MG tablet  Commonly known as: KlonoPIN      Take 1 tablet by mouth 2 (Two) Times a Day As Needed.       cyclobenzaprine 5 MG tablet  Commonly known as: FLEXERIL      Take 1 tablet by mouth 3 (Three) Times a Day As Needed for Muscle Spasms.       fluticasone 50 MCG/ACT nasal spray  Commonly known as: Flonase      2 sprays into the nostril(s) as directed by provider Daily.       levothyroxine 112 MCG tablet  Commonly known as: SYNTHROID, LEVOTHROID      TAKE 1 TABLET BY MOUTH ONCE DAILY       omeprazole 40 MG capsule  Commonly known as:  priLOSEC      TAKE 1 CAPSULE BY MOUTH ONCE DAILY       ondansetron 4 MG tablet  Commonly known as: ZOFRAN      Take 1 tablet by mouth Every 6 (Six) Hours.       QUEtiapine 25 MG tablet  Commonly known as: SEROquel      TAKE 1/2 TO 1 TABLET BY MOUTH TWICE DAILY AS NEEDED FOR ANXIETY/PANIC       rizatriptan MLT 5 MG disintegrating tablet  Commonly known as: MAXALT-MLT      Place 1 tablet on the tongue 1 (One) Time As Needed for Migraine. May repeat in 2 hours if needed       Stool Softener 100 MG capsule  Generic drug: docusate sodium      Take 1 capsule by mouth 2 (Two) Times a Day As Needed for Constipation.       traMADol 50 MG tablet  Commonly known as: ULTRAM      Take 1 tablet by mouth Every 6 (Six) Hours As Needed for Moderate Pain.

## 2025-06-28 LAB
QT INTERVAL: 352 MS
QTC INTERVAL: 413 MS

## 2025-08-04 DIAGNOSIS — R00.2 PALPITATIONS: ICD-10-CM

## 2025-08-04 DIAGNOSIS — K21.9 GASTROESOPHAGEAL REFLUX DISEASE, UNSPECIFIED WHETHER ESOPHAGITIS PRESENT: ICD-10-CM

## 2025-08-04 DIAGNOSIS — E03.9 ACQUIRED HYPOTHYROIDISM: ICD-10-CM

## 2025-08-05 RX ORDER — OMEPRAZOLE 40 MG/1
40 CAPSULE, DELAYED RELEASE ORAL DAILY
Qty: 30 CAPSULE | Refills: 0 | Status: SHIPPED | OUTPATIENT
Start: 2025-08-05

## 2025-08-05 RX ORDER — LEVOTHYROXINE SODIUM 112 UG/1
112 TABLET ORAL DAILY
Qty: 30 TABLET | Refills: 0 | Status: SHIPPED | OUTPATIENT
Start: 2025-08-05

## 2025-08-05 RX ORDER — PROPRANOLOL HCL 20 MG
20 TABLET ORAL 2 TIMES DAILY
Qty: 60 TABLET | Refills: 0 | Status: SHIPPED | OUTPATIENT
Start: 2025-08-05

## 2025-08-22 ENCOUNTER — TELEMEDICINE (OUTPATIENT)
Dept: FAMILY MEDICINE CLINIC | Facility: TELEHEALTH | Age: 30
End: 2025-08-22
Payer: COMMERCIAL

## 2025-08-22 DIAGNOSIS — J45.21 MILD INTERMITTENT ASTHMA WITH EXACERBATION: Primary | ICD-10-CM

## 2025-08-22 RX ORDER — FLUVOXAMINE MALEATE 50 MG
TABLET ORAL
COMMUNITY
Start: 2025-08-07

## 2025-08-22 RX ORDER — CLONAZEPAM 1 MG/1
TABLET ORAL
COMMUNITY
Start: 2025-08-21

## 2025-08-22 RX ORDER — BUPROPION HYDROCHLORIDE 75 MG/1
TABLET ORAL
COMMUNITY
Start: 2025-08-06

## 2025-08-22 RX ORDER — FLUVOXAMINE MALEATE 25 MG
TABLET ORAL
COMMUNITY
Start: 2025-07-10

## 2025-08-22 RX ORDER — ARIPIPRAZOLE 2 MG/1
TABLET ORAL
COMMUNITY
Start: 2025-06-12

## 2025-08-22 RX ORDER — METHYLPREDNISOLONE 4 MG/1
TABLET ORAL
Qty: 1 EACH | Refills: 0 | Status: SHIPPED | OUTPATIENT
Start: 2025-08-22

## (undated) DEVICE — SUT SILK 2/0 TIES 18IN A185H

## (undated) DEVICE — UNDERGLV SURG BIOGEL INDICATOR LF PF 7.5

## (undated) DEVICE — SOL IRR H2O BTL 1000ML STRL

## (undated) DEVICE — Device

## (undated) DEVICE — MINI ENDOCUT SCISSOR TIP, DISPOSABLE: Brand: RENEW

## (undated) DEVICE — LAPAROSCOPIC SMOKE FILTRATION SYSTEM: Brand: PALL LAPAROSHIELD® PLUS LAPAROSCOPIC SMOKE FILTRATION SYSTEM

## (undated) DEVICE — SUT VIC 0 UR6 27IN VCP603H

## (undated) DEVICE — ENDOPATH XCEL BLADELESS TROCARS WITH STABILITY SLEEVES: Brand: ENDOPATH XCEL

## (undated) DEVICE — ENDOPATH XCEL UNIVERSAL TROCAR STABLILITY SLEEVES: Brand: ENDOPATH XCEL

## (undated) DEVICE — Device: Brand: DEFENDO AIR/WATER/SUCTION AND BIOPSY VALVE

## (undated) DEVICE — SOLIDIFIER LIQ PREMISORB 1500CC

## (undated) DEVICE — LAPAROVUE VISIBILITY SYSTEM LAPAROSCOPIC SOLUTIONS: Brand: LAPAROVUE

## (undated) DEVICE — PK LAP LASR CHOLE 10

## (undated) DEVICE — GLV SURG BIOGEL LTX PF 7

## (undated) DEVICE — KT ORCA ORCAPOD DISP STRL

## (undated) DEVICE — PATIENT RETURN ELECTRODE, SINGLE-USE, CONTACT QUALITY MONITORING, ADULT, WITH 9FT CORD, FOR PATIENTS WEIGING OVER 33LBS. (15KG): Brand: MEGADYNE

## (undated) DEVICE — CONTN GRAD MEAS TRIANG 32OZ BLK

## (undated) DEVICE — ST LINER SAFECAP GRN RED CP STRL

## (undated) DEVICE — SYR LUERLOK 50ML

## (undated) DEVICE — TUBING, SUCTION, 1/4" X 10', STRAIGHT: Brand: MEDLINE

## (undated) DEVICE — SAFELINER SUCTION CANISTER 1000CC: Brand: DEROYAL

## (undated) DEVICE — FIRST STEP BEDSIDE ADD WATER KIT - RESEALABLE STAND-UP POUCH, ENDOSCOPIC CLEANING PAD - 1 POUCH: Brand: FIRST STEP BEDSIDE ADD WATER KIT - RESEALABLE STAND-UP POUCH, ENDOSCOPIC CLEANIN

## (undated) DEVICE — BLANKT WARM UPPR/BDY ARM/OUT 57X196CM

## (undated) DEVICE — GLV SURG SENSICARE PI MIC PF SZ6 LF STRL

## (undated) DEVICE — SINGLE-USE BIOPSY FORCEPS: Brand: RADIAL JAW 4

## (undated) DEVICE — SUT MNCRYL PLS ANTIB UD 4/0 PS2 18IN

## (undated) DEVICE — ADAPT CLN LUM OLYMP AIR/H20

## (undated) DEVICE — ENDOPATH XCEL BLUNT TIP TROCARS WITH SMOOTH SLEEVES: Brand: ENDOPATH XCEL

## (undated) DEVICE — ENDOPOUCH RETRIEVER SPECIMEN RETRIEVAL BAGS: Brand: ENDOPOUCH RETRIEVER

## (undated) DEVICE — HYBRID CO2 TUBING/CAP SET FOR OLYMPUS® SCOPES & CO2 SOURCE: Brand: ERBE

## (undated) DEVICE — ADHS LIQ MASTISOL 2/3ML

## (undated) DEVICE — LUBE JELLY FOIL PACK 1.4 OZ: Brand: MEDLINE INDUSTRIES, INC.

## (undated) DEVICE — ST TBG CONN PNEUMOCLEAR EVAC SMOKE HEAT/HUMID